# Patient Record
Sex: FEMALE | Race: WHITE | Employment: FULL TIME | URBAN - METROPOLITAN AREA
[De-identification: names, ages, dates, MRNs, and addresses within clinical notes are randomized per-mention and may not be internally consistent; named-entity substitution may affect disease eponyms.]

---

## 2019-05-17 ENCOUNTER — HOSPITAL ENCOUNTER (OUTPATIENT)
Dept: MAMMOGRAPHY | Age: 69
Discharge: HOME | End: 2019-05-17
Attending: ORTHOPAEDIC SURGERY
Payer: COMMERCIAL

## 2019-05-17 DIAGNOSIS — M17.11 PRIMARY OSTEOARTHRITIS OF RIGHT KNEE: ICD-10-CM

## 2019-05-17 DIAGNOSIS — Z78.0 ASYMPTOMATIC MENOPAUSAL STATE: ICD-10-CM

## 2019-05-17 PROCEDURE — 77080 DXA BONE DENSITY AXIAL: CPT

## 2019-11-11 ENCOUNTER — HOME HEALTH ADMISSION (OUTPATIENT)
Dept: HOME HEALTH SERVICES | Facility: HOME HEALTH | Age: 69
End: 2019-11-11
Payer: MEDICARE

## 2019-11-11 ENCOUNTER — HOSPITAL ENCOUNTER (OUTPATIENT)
Dept: SURGERY | Age: 69
Discharge: HOME | End: 2019-11-11
Payer: COMMERCIAL

## 2019-11-11 ENCOUNTER — HOSPITAL ENCOUNTER (OUTPATIENT)
Dept: PHYSICAL THERAPY | Age: 69
Discharge: HOME | End: 2019-11-11
Payer: COMMERCIAL

## 2019-11-11 LAB
ANION GAP SERPL CALC-SCNC: 7 MMOL/L (ref 7–16)
APTT PPP: 27.9 SEC (ref 24.7–39.8)
BACTERIA SPEC CULT: NORMAL
BASOPHILS # BLD: 0.1 K/UL (ref 0–0.2)
BASOPHILS NFR BLD: 1 % (ref 0–2)
BUN SERPL-MCNC: 13 MG/DL (ref 8–23)
CALCIUM SERPL-MCNC: 9.3 MG/DL (ref 8.3–10.4)
CHLORIDE SERPL-SCNC: 104 MMOL/L (ref 98–107)
CO2 SERPL-SCNC: 25 MMOL/L (ref 21–32)
CREAT SERPL-MCNC: 0.8 MG/DL (ref 0.6–1)
DIFFERENTIAL METHOD BLD: NORMAL
EOSINOPHIL # BLD: 0.2 K/UL (ref 0–0.8)
EOSINOPHIL NFR BLD: 2 % (ref 0.5–7.8)
ERYTHROCYTE [DISTWIDTH] IN BLOOD BY AUTOMATED COUNT: 14.6 % (ref 11.9–14.6)
EST. AVERAGE GLUCOSE BLD GHB EST-MCNC: 120 MG/DL
GLUCOSE SERPL-MCNC: 117 MG/DL (ref 65–100)
HBA1C MFR BLD: 5.8 %
HCT VFR BLD AUTO: 42.7 % (ref 35.8–46.3)
HGB BLD-MCNC: 13.7 G/DL (ref 11.7–15.4)
IMM GRANULOCYTES # BLD AUTO: 0 K/UL (ref 0–0.5)
IMM GRANULOCYTES NFR BLD AUTO: 0 % (ref 0–5)
INR PPP: 0.9
LYMPHOCYTES # BLD: 2.6 K/UL (ref 0.5–4.6)
LYMPHOCYTES NFR BLD: 41 % (ref 13–44)
MCH RBC QN AUTO: 30 PG (ref 26.1–32.9)
MCHC RBC AUTO-ENTMCNC: 32.1 G/DL (ref 31.4–35)
MCV RBC AUTO: 93.4 FL (ref 79.6–97.8)
MONOCYTES # BLD: 0.5 K/UL (ref 0.1–1.3)
MONOCYTES NFR BLD: 8 % (ref 4–12)
NEUTS SEG # BLD: 3 K/UL (ref 1.7–8.2)
NEUTS SEG NFR BLD: 47 % (ref 43–78)
NRBC # BLD: 0 K/UL (ref 0–0.2)
PLATELET # BLD AUTO: 269 K/UL (ref 150–450)
PMV BLD AUTO: 11.1 FL (ref 9.4–12.3)
POTASSIUM SERPL-SCNC: 4.2 MMOL/L (ref 3.5–5.1)
PROTHROMBIN TIME: 12.6 SEC (ref 11.7–14.5)
RBC # BLD AUTO: 4.57 M/UL (ref 4.05–5.2)
SERVICE CMNT-IMP: NORMAL
SODIUM SERPL-SCNC: 136 MMOL/L (ref 136–145)
WBC # BLD AUTO: 6.4 K/UL (ref 4.3–11.1)

## 2019-11-11 PROCEDURE — 80048 BASIC METABOLIC PNL TOTAL CA: CPT

## 2019-11-11 PROCEDURE — 85025 COMPLETE CBC W/AUTO DIFF WBC: CPT

## 2019-11-11 PROCEDURE — 85610 PROTHROMBIN TIME: CPT

## 2019-11-11 PROCEDURE — 87641 MR-STAPH DNA AMP PROBE: CPT

## 2019-11-11 PROCEDURE — 83036 HEMOGLOBIN GLYCOSYLATED A1C: CPT

## 2019-11-11 PROCEDURE — 97161 PT EVAL LOW COMPLEX 20 MIN: CPT

## 2019-11-11 PROCEDURE — 85730 THROMBOPLASTIN TIME PARTIAL: CPT

## 2019-11-11 PROCEDURE — 36415 COLL VENOUS BLD VENIPUNCTURE: CPT

## 2019-11-11 RX ORDER — LISINOPRIL 10 MG/1
10 TABLET ORAL
Status: ON HOLD | COMMUNITY
End: 2021-06-21 | Stop reason: SDUPTHER

## 2019-11-11 RX ORDER — MECLIZINE HYDROCHLORIDE 25 MG/1
25 TABLET ORAL
COMMUNITY

## 2019-11-11 RX ORDER — BUPROPION HYDROCHLORIDE 150 MG/1
150 TABLET ORAL 2 TIMES DAILY
COMMUNITY

## 2019-11-11 RX ORDER — ASCORBIC ACID 250 MG
750 TABLET ORAL DAILY
COMMUNITY

## 2019-11-11 RX ORDER — IBUPROFEN 200 MG
200 TABLET ORAL
COMMUNITY
End: 2020-03-20

## 2019-11-11 RX ORDER — CARISOPRODOL 350 MG/1
350 TABLET ORAL
COMMUNITY
End: 2021-06-21

## 2019-11-11 NOTE — PERIOP NOTES
Lab results within anesthesia guidelines. Recent Results (from the past 12 hour(s))   CBC WITH AUTOMATED DIFF    Collection Time: 11/11/19  8:55 AM   Result Value Ref Range    WBC 6.4 4.3 - 11.1 K/uL    RBC 4.57 4.05 - 5.2 M/uL    HGB 13.7 11.7 - 15.4 g/dL    HCT 42.7 35.8 - 46.3 %    MCV 93.4 79.6 - 97.8 FL    MCH 30.0 26.1 - 32.9 PG    MCHC 32.1 31.4 - 35.0 g/dL    RDW 14.6 11.9 - 14.6 %    PLATELET 142 136 - 272 K/uL    MPV 11.1 9.4 - 12.3 FL    ABSOLUTE NRBC 0.00 0.0 - 0.2 K/uL    DF AUTOMATED      NEUTROPHILS 47 43 - 78 %    LYMPHOCYTES 41 13 - 44 %    MONOCYTES 8 4.0 - 12.0 %    EOSINOPHILS 2 0.5 - 7.8 %    BASOPHILS 1 0.0 - 2.0 %    IMMATURE GRANULOCYTES 0 0.0 - 5.0 %    ABS. NEUTROPHILS 3.0 1.7 - 8.2 K/UL    ABS. LYMPHOCYTES 2.6 0.5 - 4.6 K/UL    ABS. MONOCYTES 0.5 0.1 - 1.3 K/UL    ABS. EOSINOPHILS 0.2 0.0 - 0.8 K/UL    ABS. BASOPHILS 0.1 0.0 - 0.2 K/UL    ABS. IMM.  GRANS. 0.0 0.0 - 0.5 K/UL   HEMOGLOBIN A1C WITH EAG    Collection Time: 11/11/19  8:55 AM   Result Value Ref Range    Hemoglobin A1c 5.8 %    Est. average glucose 610 mg/dL   METABOLIC PANEL, BASIC    Collection Time: 11/11/19  8:55 AM   Result Value Ref Range    Sodium 136 136 - 145 mmol/L    Potassium 4.2 3.5 - 5.1 mmol/L    Chloride 104 98 - 107 mmol/L    CO2 25 21 - 32 mmol/L    Anion gap 7 7 - 16 mmol/L    Glucose 117 (H) 65 - 100 mg/dL    BUN 13 8 - 23 MG/DL    Creatinine 0.80 0.6 - 1.0 MG/DL    GFR est AA >60 >60 ml/min/1.73m2    GFR est non-AA >60 >60 ml/min/1.73m2    Calcium 9.3 8.3 - 10.4 MG/DL   PROTHROMBIN TIME + INR    Collection Time: 11/11/19  8:55 AM   Result Value Ref Range    Prothrombin time 12.6 11.7 - 14.5 sec    INR 0.9     PTT    Collection Time: 11/11/19  8:55 AM   Result Value Ref Range    aPTT 27.9 24.7 - 39.8 SEC

## 2019-11-11 NOTE — PROGRESS NOTES
SW met with pt in Prehab to discuss Right TKA scheduled for 12/2/19. Pt lives alone but plans to return home with HHPT. Pt states her sister can stay with her a few nights at home postop. Pt [facility] and is agreeable to [facility]. [facility] referral completed. Pt has a RW and high toilet seats. No additional needs or questions identified at this time.    Yovanny Stewartop

## 2019-11-11 NOTE — PERIOP NOTES
Patient verified name and     Order for consent was found in EHR and matches case posting; patient verified. Type 3 surgery, PAT joint camp assessment complete. Labs per surgeon: CBC,BMP,PT/PTT, HgbA1C, MRSA swab; results within anesthesia guidelines  Labs per anesthesia protocol: No additional lab required  EKG: Done 2019 and copy placed on chart for anesthesia reference    Called Dr. Francisco Javier Matthew office and requested last office note to be faxed to [phone] for anesthesia reference. Spoke with Minor Jean Baptiste at the office. Hospital approved surgical skin cleanser and instructions given per hospital policy. Patient provided with and instructed on educational handouts including Guide to Surgery, Pain Management, Hand Hygiene, Blood Transfusion Education, and [place] [facility] Brochure. Patient answered medical/surgical history questions at their best of ability. All prior to admission medications documented in [facility]. Original medication prescription bottle not visualized during patient appointment. Patient instructed to hold all vitamins 7 days prior to surgery and NSAIDS 5 days prior to surgery, patient verbalized understanding. Prescription medications to hold: Ibuprofen x 5 days    Patient instructed to continue previous medications as prescribed prior to surgery and to take the following medications the day of surgery according to anesthesia guidelines with a small sip of water: Soma and Wellbutrin. Patient teach back successful and patient demonstrates knowledge of instructions.

## 2019-11-11 NOTE — PROGRESS NOTES
Rufus Kussmaul  : [birth year](52 y.o.) [facility] at [place] [address], [address] [facility] [place]  [phone]       Physical Therapy Prehab Plan of Treatment and Evaluation Summary:2019    ICD-10: Treatment Diagnosis:   · Pain in Right Knee (M25.561)  · Stiffness of Right Knee, Not elsewhere classified (M25.661)  · Difficulty in walking, Not elsewhere classified (R26.2)  · Other abnormalities of gait and mobility (R26.89)  Precautions/Allergies:   Codeine  MEDICAL/REFERRING DIAGNOSIS:  Unilateral primary osteoarthritis, right knee [M17.11]  REFERRING PHYSICIAN: James Yee MD  DATE OF SURGERY: 19    Assessment:   Comments:  Pain in right knee joint with decreased independence with functional mobility. Pt plans to return home following hospital stay. Pt motivated and prepared for surgery. PROBLEM LIST (Impacting functional limitations):  Ms. Sandra Sevilla presents with the following right lower extremity(s) problems:  1. Transfers  2. Gait  3. Strength  4. Range of Motion  5. Pain   INTERVENTIONS PLANNED:  1. Home Exercise Program  2. Educational Discussion      TREATMENT PLAN: Effective Dates: 2019 TO 2019. Frequency/Duration: Patient to continue to perform home exercise program at least twice per day up until her surgery. GOALS: (Goals have been discussed and agreed upon with patient.)  Discharge Goals: Time Frame: 1 Day  1. Patient will demonstrate independence with a home exercise program designed to increase strength, range of motion and pain control to minimize functional deficits and optimize patient for total joint replacement. Rehabilitation Potential For Stated Goals: Good  Regarding Andre Hand's therapy, I certify that the treatment plan above will be carried out by a therapist or under their direction.   Thank you for this referral,  Kathrine Mcclure, PT               HISTORY:   Present Symptoms:  Pain Intensity 1: 5  Pain Location 1: Knee  Pain Orientation 1: Right   History of Present Injury/Illness (Reason for Referral):  Medical/Referring Diagnosis: Unilateral primary osteoarthritis, right knee [M17.11]   Past Medical History/Comorbidities:   Ms. Naveen De La Cruz  has a past medical history of Adverse effect of anesthesia, Arthritis, Cancer ([facility] [address] and Hypertension. Ms. Naveen De La Cruz  has a past surgical history that includes hx cataract removal (Bilateral, 12/2018,01/2019) and hx tubal ligation. Social History/Living Environment:   Home Environment: Private residence  # Steps to Enter: 14  One/Two Story Residence: Two story, live on 1st floor  # of Interior Steps: 0  Living Alone: Yes  Support Systems: Other (comments)(siblings)  Patient Expects to be Discharged to[de-identified] Private residence  Current DME Used/Available at Home: Anastacia Pillai, [address] [employer] [address] chair  Tub or Shower Type: Tub/Shower combination  Work/Activity:  Employment requires light activity.   Dominant Side:  RIGHT  Current Medications:  See Pre-assessment nursing note   Number of Personal Factors/Comorbidities that affect the Plan of Care: 0: LOW COMPLEXITY   EXAMINATION:   ADLs (Current Functional Status):   Ambulation:  [] Independent  [] Walk Indoors Only  [] Walk Outdoors  [x] Use Assistive Device walker  [] Use Wheelchair Only Dressing:  [] Independent  Requires Assistance from Someone for:  [] Sock/Shoes  [] Pants  [] Everything   Bathing/Showering:   [x] Independent  [] Requires Assistance from Someone  [] Sponge Bath Only Household Activities:  [] Routine house and yard work  [x] Light Housework Only  [] None   Observation/Orthostatic Postural Assessment:   Within defined limitsGenu valgus right  ROM/Flexibility:   AROM: Generally decreased, functional                LLE Assessment  LLE Assessment (WDL): Within defined limits      RLE AROM  R Knee Flexion: 80  R Knee Extension: 25   Strength:   Strength: Generally decreased, functional                  Functional Mobility:    Coordination: Generally decreased, functional    Gait Description (WDL): Within defined limits  Stand to Sit: Independent  Sit to Stand: Independent  Distance (ft): 1000 Feet (ft)  Ambulation - Level of Assistance: Independent  Gait Abnormalities: Antalgic          Balance:    Sitting: Intact  Standing: Intact   Body Structures Involved:  1. Bones  2. Joints  3. Muscles  4. Ligaments Body Functions Affected:  1. Neuromusculoskeletal  2. Movement Related Activities and Participation Affected:  1. Mobility   Number of elements that affect the Plan of Care: 4+: HIGH COMPLEXITY   CLINICAL PRESENTATION:   Presentation: Stable and uncomplicated: LOW COMPLEXITY   CLINICAL DECISION MAKING:   Outcome Measure: Tool Used: Lower Extremity Functional Scale (LEFS)  Score:  Initial: 36/80 Most Recent: X/80 (Date: -- )   Interpretation of Score: 20 questions each scored on a 5 point scale with 0 representing \"extreme difficulty or unable to perform\" and 4 representing \"no difficulty\". The lower the score, the greater the functional disability. 80/80 represents no disability. Minimal detectable change is 9 points. Medical Necessity:   · Ms. Lucille Almazan is expected to optimize her lower extremity strength and ROM in preparation for joint replacement surgery. Reason for Services/Other Comments:  · Achieve baseline assesment of musculoskeletal system, functional mobility and home environment. , educate in PT HEP in preparation for surgery, educate in hospital plan of care. Use of outcome tool(s) and clinical judgement create a POC that gives a: Clear prediction of patient's progress: LOW COMPLEXITY   TREATMENT:   Treatment/Session Assessment:  Patient was instructed in PT- HEP to increase strength and ROM in LEs. Answered all questions. · Post session pain:  5  · Compliance with Program/Exercises: compliant most of the time.   Total Treatment Duration:  PT Patient Time In/Time Out  Time In: 0915  Time Out: [facility]

## 2019-11-12 VITALS
HEART RATE: 83 BPM | SYSTOLIC BLOOD PRESSURE: 147 MMHG | OXYGEN SATURATION: 96 % | HEIGHT: 66 IN | TEMPERATURE: 98 F | DIASTOLIC BLOOD PRESSURE: 83 MMHG | RESPIRATION RATE: 20 BRPM

## 2019-11-12 NOTE — ADVANCED PRACTICE NURSE
Total Joint Surgery Preoperative Chart Review      Patient ID:  Albert De La Rosa  [id number]  89 y.o.  1950  Surgeon: Dr. Иван Tinajero  Date of Surgery: 2019  Procedure: Total Right Knee Arthroplasty  Primary Care Physician: Roxanne Oropeza -[phone]  Specialty Physician(s):      Subjective:   Albert De La Rosa is a 80 y.o. WHITE OR  female who presents for preoperative evaluation for Total Right Knee arthroplasty. This is a preoperative chart review note based on data collected by the nurse at the surgical Pre-Assessment visit. Past Medical History:   Diagnosis Date    Adverse effect of anesthesia     \"hard to knock me out sometimes\"    Arthritis     Cancer ([facility] [address]     melanoma X 2    Hypertension     managed with medication      Past Surgical History:   Procedure Laterality Date    HX CATARACT REMOVAL Bilateral 2018,2019    HX TUBAL LIGATION      2019-\"40 yrs ago\"     History reviewed. No pertinent family history. Social History     Tobacco Use    Smoking status: Former Smoker     Last attempt to quit: 2019     Years since quittin.4    Smokeless tobacco: Never Used   Substance Use Topics    Alcohol use: Yes     Comment: occasional       Prior to Admission medications    Medication Sig Start Date End Date Taking? Authorizing Provider   buPROPion XL (WELLBUTRIN XL) 150 mg tablet Take 150 mg by mouth two (2) times a day. Yes Provider, Historical   lisinopril (PRINIVIL, ZESTRIL) 10 mg tablet Take 10 mg by mouth nightly. Yes Provider, Historical   meclizine (ANTIVERT) 25 mg tablet Take 25 mg by mouth as needed. Yes Provider, Historical   carisoprodol (SOMA) 350 mg tablet Take 350 mg by mouth as needed for Muscle Spasm(s). Yes Provider, Historical   cholecalciferol, vitamin D3, (VITAMIN D3 PO) Take 1 Tab by mouth two (2) times a day. Yes Provider, Historical   ginkgo biloba (GINKOBA PO) Take 120 mg by mouth two (2) times a day.    Yes Provider, Historical folic acid/multivit-min/lutein (CENTRUM SILVER PO) Take 1 Tab by mouth daily. Yes Provider, Historical   ascorbic acid, vitamin C, (VITAMIN C) 250 mg tablet Take 750 mg by mouth daily. Yes Provider, Historical   ibuprofen (ADVIL) 200 mg tablet Take 200 mg by mouth every six (6) hours as needed for Pain. Yes Provider, Historical     Allergies   Allergen Reactions    Codeine Nausea and Vomiting     Increased heart rate          Objective:     Physical Exam:   No data found. ECG:    EKG Results     None          Data Review:   Labs:   Results for Colleen Elizondo (MRN [id number]) as of 11/12/2019 12:13   Ref. Range 11/11/2019 08:55   Sodium Latest Ref Range: 136 - 145 mmol/L 136   Potassium Latest Ref Range: 3.5 - 5.1 mmol/L 4.2   Chloride Latest Ref Range: 98 - 107 mmol/L 104   CO2 Latest Ref Range: 21 - 32 mmol/L 25   Anion gap Latest Ref Range: 7 - 16 mmol/L 7   Glucose Latest Ref Range: 65 - 100 mg/dL 117 (H)   BUN Latest Ref Range: 8 - 23 MG/DL 13   Creatinine Latest Ref Range: 0.6 - 1.0 MG/DL 0.80   Calcium Latest Ref Range: 8.3 - 10.4 MG/DL 9.3   GFR est non-AA Latest Ref Range: >60 ml/min/1.73m2 >60   GFR est AA Latest Ref Range: >60 ml/min/1.73m2 >60   Hemoglobin A1c, (calculated) Latest Units: % 5.8   Est. average glucose Latest Units: mg/dL 120       Total Joint Surgery Pre-Assessment Recommendations:           Recommend continuous saturation monitoring hours of sleep, during hospitalization.       Signed By: EUGENIA Quinones    November 12, 2019

## 2019-11-13 NOTE — PROGRESS NOTES
19 0900   Oxygen Therapy   O2 Sat (%) 95 %   Pulse via Oximetry 100 beats per minute   O2 Device Room air   Pre-Treatment   Breath Sounds Bilateral Clear   Pre FEV1 (liters) 2.2 liters   % Predicted 88   Incentive Spirometry Treatment   Actual Volume (ml) 2500 ml     Initial respiratory Assessment completed with pt. Pt was interviewed and evaluated in Joint camp prior to surgery. Patient ID:  Bronson Morales  [id number]  81 y.o.  1950  Surgeon: Dr. Marvin Titus  Date of Surgery: The linked surgery was not found. Please check manually. Procedure: Total Right Knee Arthroplasty  Primary Care Physician: Rosana Dobson -[phone]  Specialists:                                  Pt instructed in the use of Incentive Spirometry. Pt instructed to bring Incentive Spirometer back on date of surgery & to start using Is upon return to pt room.     Pt taught proper cough technique    History of smokin-5 CIGARETTES/DAY FOR 30 YEARS                                                      Quit date:           Secondhand smoke:PARENTS      Past procedures with Oxygen desaturation:DENIES    Past Medical History:   Diagnosis Date    Adverse effect of anesthesia     \"hard to knock me out sometimes\"    Arthritis     Cancer ([facility] [address]     melanoma X 2    Hypertension     managed with medication                                                                                                                                                     Respiratory history:DENIES SOB                                                                     Respiratory meds:  DENIES                                       FAMILY PRESENT:                                                         NO                                        PAST SLEEP STUDY:                          DENIES  HX OF SOFIA:                                           DENIES                                     SOFIA assessment: SLEEPS ON SIDE           PHYSICAL EXAM   There is no height or weight on file to calculate BMI.    Visit Vitals  /83 (BP 1 Location: Left arm, BP Patient Position: Sitting)   Pulse 83   Temp 98 °F (36.7 °C)   Resp 20   Ht 5' 5.5\" (1.664 m)   SpO2 96%     Neck circumference:   35   cm    Loud snoring:        DOESN'T KNOW                                 Witnessed apnea or wakening gasping or choking:,             DENIES,                                                                                                 Awakens with headaches:                                                  DENIES    Morning or daytime tiredness/ sleepiness:                    DENIES                                                                                         Dry mouth or sore throat in morning:                                                                                     DENIES    Pollard stage:  3    SACS score:4      Stop Bang   STOP-BANG  Does the patient snore loudly (louder than talking or loud enough to be heard through closed doors)?: No  Does the patient often feel tired, fatigued, or sleepy during the daytime, even after a \"good\" night's sleep?: No  Has anyone ever observed the patient stop breathing during their sleep? : No  Does the patient have or are they being treated for high blood pressure?: Yes  Is the patient's BMI greater than 35?: No  Is your neck circumference greater than 17 inches (Male) or 16 inches (Female)?: No  Is the patient older than 48?: Yes  Is the patient male?: No  SOFIA Score: 2                            CPAP:                       NONE                                                 CONT SAT HS            Referrals:    Pt. Phone Number:

## 2019-11-26 NOTE — H&P
Patient has been treated by her PCP for a urinary track infection and has finished her antibiotic course but she is still experiencing symptoms. This has been going on for 3 weeks. She has tried to get back into see Dr. Michaels but has not heard back from their office. I instructed her to call again and get into see them this week.

## 2019-11-27 RX ORDER — CEFAZOLIN SODIUM/WATER 2 G/20 ML
2 SYRINGE (ML) INTRAVENOUS ONCE
Status: CANCELLED | OUTPATIENT
Start: 2019-12-02 | End: 2019-12-02

## 2019-12-30 ENCOUNTER — HOSPITAL ENCOUNTER (OUTPATIENT)
Dept: SURGERY | Age: 69
Discharge: HOME | End: 2019-12-30

## 2019-12-30 NOTE — PERIOP NOTES
Pt did not come for 3:00 PAT appointment. Unable to leave pt a message on the only  number listed on chart. I called and left voice message for Rashida Henson at Dr Jade Lucas office.

## 2020-02-25 ENCOUNTER — APPOINTMENT (OUTPATIENT)
Dept: PHYSICAL THERAPY | Age: 70
End: 2020-02-25

## 2020-02-29 ENCOUNTER — HOSPITAL ENCOUNTER (OUTPATIENT)
Dept: CT IMAGING | Age: 70
Discharge: HOME | End: 2020-02-29
Attending: NURSE PRACTITIONER
Payer: SELF-PAY

## 2020-02-29 DIAGNOSIS — N20.0 KIDNEY STONE: ICD-10-CM

## 2020-02-29 DIAGNOSIS — N39.0 RECURRENT UTI: ICD-10-CM

## 2020-02-29 PROCEDURE — 74176 CT ABD & PELVIS W/O CONTRAST: CPT

## 2020-03-04 ENCOUNTER — ANESTHESIA EVENT (OUTPATIENT)
Dept: SURGERY | Age: 70
End: 2020-03-04
Payer: MEDICARE

## 2020-03-05 ENCOUNTER — HOSPITAL ENCOUNTER (OUTPATIENT)
Age: 70
Setting detail: OUTPATIENT SURGERY
Discharge: HOME | End: 2020-03-05
Attending: UROLOGY | Admitting: UROLOGY
Payer: MEDICARE

## 2020-03-05 ENCOUNTER — ANESTHESIA (OUTPATIENT)
Dept: SURGERY | Age: 70
End: 2020-03-05
Payer: MEDICARE

## 2020-03-05 VITALS
TEMPERATURE: 97.9 F | WEIGHT: 177 LBS | OXYGEN SATURATION: 97 % | DIASTOLIC BLOOD PRESSURE: 75 MMHG | BODY MASS INDEX: 28.57 KG/M2 | HEART RATE: 92 BPM | SYSTOLIC BLOOD PRESSURE: 133 MMHG | RESPIRATION RATE: 14 BRPM

## 2020-03-05 DIAGNOSIS — N20.1 URETERAL STONE: Primary | ICD-10-CM

## 2020-03-05 LAB — POTASSIUM BLD-SCNC: 3.9 MMOL/L (ref 3.5–5.1)

## 2020-03-05 PROCEDURE — 88300 SURGICAL PATH GROSS: CPT

## 2020-03-05 PROCEDURE — 77030020463 HC FCPS ENDOSC STN BSC -C: Performed by: UROLOGY

## 2020-03-05 PROCEDURE — 77030010509 HC AIRWY LMA MSK TELE -A: Performed by: ANESTHESIOLOGY

## 2020-03-05 PROCEDURE — 74011636320 HC RX REV CODE- 636/320: Performed by: UROLOGY

## 2020-03-05 PROCEDURE — 74011250636 HC RX REV CODE- 250/636: Performed by: ANESTHESIOLOGY

## 2020-03-05 PROCEDURE — 76210000020 HC REC RM PH II FIRST 0.5 HR: Performed by: UROLOGY

## 2020-03-05 PROCEDURE — 77030033647: Performed by: UROLOGY

## 2020-03-05 PROCEDURE — 76210000016 HC OR PH I REC 1 TO 1.5 HR: Performed by: UROLOGY

## 2020-03-05 PROCEDURE — 84132 ASSAY OF SERUM POTASSIUM: CPT

## 2020-03-05 PROCEDURE — 77030018832 HC SOL IRR H20 ICUM -A: Performed by: UROLOGY

## 2020-03-05 PROCEDURE — C2617 STENT, NON-COR, TEM W/O DEL: HCPCS | Performed by: UROLOGY

## 2020-03-05 PROCEDURE — 82355 CALCULUS ANALYSIS QUAL: CPT

## 2020-03-05 PROCEDURE — 77030019927 HC TBNG IRR CYSTO BAXT -A: Performed by: UROLOGY

## 2020-03-05 PROCEDURE — 77030019908 HC STETH ESOPH SIMS -A: Performed by: ANESTHESIOLOGY

## 2020-03-05 PROCEDURE — 77030040361 HC SLV COMPR DVT MDII -B: Performed by: UROLOGY

## 2020-03-05 PROCEDURE — 74011250637 HC RX REV CODE- 250/637: Performed by: ANESTHESIOLOGY

## 2020-03-05 PROCEDURE — 76060000033 HC ANESTHESIA 1 TO 1.5 HR: Performed by: UROLOGY

## 2020-03-05 PROCEDURE — 74011250636 HC RX REV CODE- 250/636: Performed by: UROLOGY

## 2020-03-05 PROCEDURE — C1769 GUIDE WIRE: HCPCS | Performed by: UROLOGY

## 2020-03-05 PROCEDURE — 76010000149 HC OR TIME 1 TO 1.5 HR: Performed by: UROLOGY

## 2020-03-05 PROCEDURE — 74011000250 HC RX REV CODE- 250: Performed by: NURSE ANESTHETIST, CERTIFIED REGISTERED

## 2020-03-05 PROCEDURE — C1894 INTRO/SHEATH, NON-LASER: HCPCS | Performed by: UROLOGY

## 2020-03-05 PROCEDURE — 74011250636 HC RX REV CODE- 250/636: Performed by: NURSE ANESTHETIST, CERTIFIED REGISTERED

## 2020-03-05 DEVICE — URETERAL STENT
Type: IMPLANTABLE DEVICE | Site: URETER | Status: FUNCTIONAL
Brand: PERCUFLEX™ PLUS

## 2020-03-05 RX ORDER — MIDAZOLAM HYDROCHLORIDE 1 MG/ML
2 INJECTION, SOLUTION INTRAMUSCULAR; INTRAVENOUS ONCE
Status: DISCONTINUED | OUTPATIENT
Start: 2020-03-05 | End: 2020-03-05 | Stop reason: HOSPADM

## 2020-03-05 RX ORDER — SODIUM CHLORIDE, SODIUM LACTATE, POTASSIUM CHLORIDE, CALCIUM CHLORIDE 600; 310; 30; 20 MG/100ML; MG/100ML; MG/100ML; MG/100ML
75 INJECTION, SOLUTION INTRAVENOUS CONTINUOUS
Status: DISCONTINUED | OUTPATIENT
Start: 2020-03-05 | End: 2020-03-05 | Stop reason: HOSPADM

## 2020-03-05 RX ORDER — HYDROMORPHONE HYDROCHLORIDE 2 MG/ML
0.5 INJECTION, SOLUTION INTRAMUSCULAR; INTRAVENOUS; SUBCUTANEOUS
Status: DISCONTINUED | OUTPATIENT
Start: 2020-03-05 | End: 2020-03-05 | Stop reason: HOSPADM

## 2020-03-05 RX ORDER — FENTANYL CITRATE 50 UG/ML
INJECTION, SOLUTION INTRAMUSCULAR; INTRAVENOUS AS NEEDED
Status: DISCONTINUED | OUTPATIENT
Start: 2020-03-05 | End: 2020-03-05 | Stop reason: HOSPADM

## 2020-03-05 RX ORDER — ONDANSETRON 2 MG/ML
INJECTION INTRAMUSCULAR; INTRAVENOUS AS NEEDED
Status: DISCONTINUED | OUTPATIENT
Start: 2020-03-05 | End: 2020-03-05 | Stop reason: HOSPADM

## 2020-03-05 RX ORDER — OXYCODONE HYDROCHLORIDE 5 MG/1
10 TABLET ORAL
Status: DISCONTINUED | OUTPATIENT
Start: 2020-03-05 | End: 2020-03-05 | Stop reason: HOSPADM

## 2020-03-05 RX ORDER — CEPHALEXIN 500 MG/1
500 CAPSULE ORAL 2 TIMES DAILY
Qty: 10 CAP | Refills: 0 | Status: SHIPPED | OUTPATIENT
Start: 2020-03-05 | End: 2020-03-10

## 2020-03-05 RX ORDER — CEFAZOLIN SODIUM/WATER 2 G/20 ML
2 SYRINGE (ML) INTRAVENOUS
Status: COMPLETED | OUTPATIENT
Start: 2020-03-05 | End: 2020-03-05

## 2020-03-05 RX ORDER — PHENAZOPYRIDINE HYDROCHLORIDE 200 MG/1
200 TABLET, FILM COATED ORAL
Qty: 9 TAB | Refills: 0 | Status: SHIPPED | OUTPATIENT
Start: 2020-03-05 | End: 2020-03-08

## 2020-03-05 RX ORDER — DEXAMETHASONE SODIUM PHOSPHATE 4 MG/ML
INJECTION, SOLUTION INTRA-ARTICULAR; INTRALESIONAL; INTRAMUSCULAR; INTRAVENOUS; SOFT TISSUE AS NEEDED
Status: DISCONTINUED | OUTPATIENT
Start: 2020-03-05 | End: 2020-03-05 | Stop reason: HOSPADM

## 2020-03-05 RX ORDER — FAMOTIDINE 20 MG/1
20 TABLET, FILM COATED ORAL ONCE
Status: COMPLETED | OUTPATIENT
Start: 2020-03-05 | End: 2020-03-05

## 2020-03-05 RX ORDER — LIDOCAINE HYDROCHLORIDE 20 MG/ML
INJECTION, SOLUTION EPIDURAL; INFILTRATION; INTRACAUDAL; PERINEURAL AS NEEDED
Status: DISCONTINUED | OUTPATIENT
Start: 2020-03-05 | End: 2020-03-05 | Stop reason: HOSPADM

## 2020-03-05 RX ORDER — LIDOCAINE HYDROCHLORIDE 10 MG/ML
0.1 INJECTION INFILTRATION; PERINEURAL AS NEEDED
Status: DISCONTINUED | OUTPATIENT
Start: 2020-03-05 | End: 2020-03-05 | Stop reason: HOSPADM

## 2020-03-05 RX ORDER — FENTANYL CITRATE 50 UG/ML
100 INJECTION, SOLUTION INTRAMUSCULAR; INTRAVENOUS ONCE
Status: DISCONTINUED | OUTPATIENT
Start: 2020-03-05 | End: 2020-03-05 | Stop reason: HOSPADM

## 2020-03-05 RX ORDER — OXYCODONE HYDROCHLORIDE 5 MG/1
5 TABLET ORAL
Status: DISCONTINUED | OUTPATIENT
Start: 2020-03-05 | End: 2020-03-05 | Stop reason: HOSPADM

## 2020-03-05 RX ORDER — OXYCODONE AND ACETAMINOPHEN 5; 325 MG/1; MG/1
1 TABLET ORAL
Qty: 20 TAB | Refills: 0 | Status: SHIPPED | OUTPATIENT
Start: 2020-03-05 | End: 2020-03-12

## 2020-03-05 RX ORDER — HYOSCYAMINE SULFATE 0.12 MG/1
0.12 TABLET SUBLINGUAL
Qty: 30 TAB | Refills: 1 | Status: SHIPPED | OUTPATIENT
Start: 2020-03-05

## 2020-03-05 RX ORDER — PROPOFOL 10 MG/ML
INJECTION, EMULSION INTRAVENOUS AS NEEDED
Status: DISCONTINUED | OUTPATIENT
Start: 2020-03-05 | End: 2020-03-05 | Stop reason: HOSPADM

## 2020-03-05 RX ADMIN — PROPOFOL 250 MG: 10 INJECTION, EMULSION INTRAVENOUS at 12:21

## 2020-03-05 RX ADMIN — Medication 2 G: at 12:18

## 2020-03-05 RX ADMIN — HYDROMORPHONE HYDROCHLORIDE 0.5 MG: 2 INJECTION INTRAMUSCULAR; INTRAVENOUS; SUBCUTANEOUS at 13:32

## 2020-03-05 RX ADMIN — DEXAMETHASONE SODIUM PHOSPHATE 4 MG: 4 INJECTION, SOLUTION INTRAMUSCULAR; INTRAVENOUS at 12:31

## 2020-03-05 RX ADMIN — LIDOCAINE HYDROCHLORIDE 100 MG: 20 INJECTION, SOLUTION EPIDURAL; INFILTRATION; INTRACAUDAL; PERINEURAL at 12:20

## 2020-03-05 RX ADMIN — FENTANYL CITRATE 25 MCG: 50 INJECTION INTRAMUSCULAR; INTRAVENOUS at 13:08

## 2020-03-05 RX ADMIN — SODIUM CHLORIDE, SODIUM LACTATE, POTASSIUM CHLORIDE, AND CALCIUM CHLORIDE 75 ML/HR: 600; 310; 30; 20 INJECTION, SOLUTION INTRAVENOUS at 10:33

## 2020-03-05 RX ADMIN — ONDANSETRON 4 MG: 2 INJECTION INTRAMUSCULAR; INTRAVENOUS at 12:30

## 2020-03-05 RX ADMIN — FENTANYL CITRATE 25 MCG: 50 INJECTION INTRAMUSCULAR; INTRAVENOUS at 12:58

## 2020-03-05 RX ADMIN — FENTANYL CITRATE 25 MCG: 50 INJECTION INTRAMUSCULAR; INTRAVENOUS at 12:48

## 2020-03-05 RX ADMIN — FENTANYL CITRATE 25 MCG: 50 INJECTION INTRAMUSCULAR; INTRAVENOUS at 12:28

## 2020-03-05 RX ADMIN — HYDROMORPHONE HYDROCHLORIDE 0.5 MG: 2 INJECTION INTRAMUSCULAR; INTRAVENOUS; SUBCUTANEOUS at 13:38

## 2020-03-05 RX ADMIN — FAMOTIDINE 20 MG: 20 TABLET ORAL at 10:34

## 2020-03-05 NOTE — OP NOTES
[facility]  OPERATIVE REPORT    Name:  Og Barone  MR#:  [id number]  :  1950  ACCOUNT #:  [de-identified]  DATE OF SERVICE:  2020    PREOPERATIVE DIAGNOSIS:  Bilateral ureteral stones. POSTOPERATIVE DIAGNOSIS:  Bilateral ureteral stones. PROCEDURES PERFORMED:  Cystoscopy, bilateral retrograde pyelograms, bilateral ureteroscopy, laser lithotripsy, basket of stone, bilateral ureteral stent placement. SURGEON:  Mark Funk MD    ASSISTANT:  None. ANESTHESIA:  General.    COMPLICATIONS:  None. SPECIMENS REMOVED:  Right ureter stone sent for analysis. IMPLANTS:  6 x 26 double-J ureteral stents bilaterally with strings attached. ESTIMATED BLOOD LOSS:  1 mL. FINDINGS:  Bilateral distal ureteral stones impacted in the distal ureters, fragmented and removed. Bilateral hydroureteronephrosis on retrograde pyelograms down to the level of the stones, bilateral ureteral stent placement with strings    INDICATIONS FOR OPERATIVE PROCEDURE:  The patient is a 44-year-old female with a  history of kidney stones, found to have bilateral distal ureteral stones that were obstructing and therefore was taken to the operating room urgently for bilateral ureteroscopy with stent placement today. DESCRIPTION OF PROCEDURE:  After informed consent was obtained and the correct patient was identified in the preoperative holding area, she was taken back to the operating suite and placed on table in the supine position. Time-out was performed confirming the correct patient and planned procedure. She received 2 g of IV Ancef prior to smooth induction of general endotracheal anesthesia. She was moved into dorsal lithotomy position, prepped and draped in usual sterile fashion. I began the case by inserting a 22-Portuguese rigid cystoscope with a 30-degree lens in the urethra and advanced into the patient's bladder. Pancystoscopy was performed which was unremarkable.   Ureteral orifices were in orthotopic positions bilaterally. I cannulated each with a sensor wire which was passed under fluoroscopic guidance into the bilateral renal pelvises. I then backloaded the scope off the sensor wires and switched to pressure bag and my semi-rigid ureteroscope. I started with the right ureter and inserted this under direct vision alongside the wire. I immediately encountered a distal right ureteral stone, which I lasered using a 365 micron laser fiber with the settings of 0.5 joules and 5 Hz into basketable pieces. A Tricep basket was used to remove all the pieces and deposit them in the bladder. I then inspected the ureter carefully all the way up to the kidney and down to the bladder. I injected 10 mL of Conray to perform retrograde pyelogram, which revealed hydroureteronephrosis down to the level of where the stone was stuck. The stone was impacted when I encountered it, but the ureter was widely patent thereafter removal.  I then turned my attention to left ureteral orifice and inserted the rigid ureteroscope into this in the exact same fashion as I did on the right side. I again encountered another impacted stone on this left side and lasered it into basketable pieces. I then removed it and using a Tricep basket inspected the ureter completely. There were no further stones or abnormalities noted. I injected Conray to perform the left retrograde pyelogram which again showed hydroureteronephrosis down to the level of where the impacted stone was, but no other abnormalities. At this point, I removed my semi-rigid ureteroscope. I then loaded 6 x 26 ureteral stents onto each wire using the rigid cystoscope and passed them under fluoroscopic guidance sequentially into the left and right renal pelvises. Once the stents were in position, I pulled the wires noting good curls in the renal pelvises as well as the bladder. Strings were left extruding from the meatus.   The bladder was then drained and the stone was removed from the bladder. [place] was sent for analysis. The patient was then awoken from anesthesia, transferred to PACU in stable condition. She tolerated the procedure well. There were no complications. All counts were correct at the end of procedure.         Desirae Cervantes MD      [id number]  D:  03/05/2020 13:25  T:  03/05/2020 18:21  JOB #:  [id number]

## 2020-03-05 NOTE — BRIEF OP NOTE
BRIEF OPERATIVE NOTE    Date of Procedure: 3/5/2020     Preoperative Diagnosis: Ureteral stone [N20.1]    Postoperative Diagnosis: Ureteral stone [N20.1]      Procedure(s):  CYSTOSCOPY BILATERAL RETROGRADE PYELOGRAMS,  AND BILATERAL URETEROSCOPY LASER LITHOTRIPSY, BASKET OF STONE, BILATERAL STENT PLACEMENT    Surgeon(s) and Role:     Bernarda Dior MD - Primary         Surgical Assistant: None    Surgical Staff:  Circ-1: Carter Acostaome  Scrub Tech-1: Uziel Earl  Event Time In Time Out   Incision Start 1236    Incision Close       Anesthesia: General   Estimated Blood Loss: 1 cc  Specimens:   ID Type Source Tests Collected by Time Destination   1 : Right Ureter Stone Fresh Hema Aguilar MD 3/5/2020 1306 Pathology      Findings: Bilateral distal ureteral stones impacted in distal ureter fragmented and removed. Bilateral hydro-ureteronephrosis on retrograde pyelograms down to the level of the stones. Bilateral ureteral stent placement with strings     Complications: None    Implants:   Implant Name Type Inv.  Item Serial No.  Lot No. LRB No. Used Action   STENT URET 6F 26CM -- PERCUFLEX PLUS [id number] - [id number]  STENT URET 6F 26CM -- [address] [place] [address] [id number]  [facility] UROLOGY-[facility] [id number] Left 1 Implanted

## 2020-03-05 NOTE — H&P
H&P Update:  María Elena Ulrich was seen and examined. History and physical has been reviewed. The patient has been examined. There have been no significant clinical changes since the completion of the originally dated History and Physical.    María Elena Ulrich  : 1950         Chief Complaint   Patient presents with    Recurrent UTI    New Patient            HELEN Ulrich is a 77 y.o. female presenting here as a new patient for recurrent UTIs that started in 2019. She has been treated by her PCP, Dr. Gladies Boast. Currently finishing macrobid. Urine cultures all with E. Coli:   10/24/19- E. Coli  19- E. Coli  19- E. Coli  19- E. Coli  20- E. Coli      Prior to October, she reports having UTIs maybe once every two years. She has never liked to wear underwear. She has had one vaginal delivery. She is not sexually active. She drinks water throughout the day. Denies constipation. Reports urge incontinence only at night and this is occasional, denies urge incontinence during the day. Denies vaginal dryness. Reports ongoing dysuria,  urinary frequency and occasional \"kidney soreness\". Hx of kidney stones in the past, passing two stones, not requiring procedure.   Feels like the macrodantin is just not working, denies fevers/chills with current UTI but has had febrile UTIs recently.       PVR today is 6 ml by US.          Past Medical History:   Diagnosis Date    Adverse effect of anesthesia       \"hard to knock me out sometimes\"    Arthritis      Cancer ([facility] [address]       melanoma X 2    Hypertension       managed with medication            Past Surgical History:   Procedure Laterality Date    HX CATARACT REMOVAL Bilateral 2018,2019    HX TUBAL LIGATION         2019-\"40 yrs ago\"             Current Outpatient Medications   Medication Sig Dispense Refill    buPROPion XL (WELLBUTRIN XL) 150 mg tablet Take 150 mg by mouth two (2) times a day.        lisinopril (PRINIVIL, ZESTRIL) 10 mg tablet Take 10 mg by mouth nightly.        meclizine (ANTIVERT) 25 mg tablet Take 25 mg by mouth as needed.        carisoprodol (SOMA) 350 mg tablet Take 350 mg by mouth as needed for Muscle Spasm(s).        cholecalciferol, vitamin D3, (VITAMIN D3 PO) Take 1 Tab by mouth two (2) times a day.        ginkgo biloba (GINKOBA PO) Take 120 mg by mouth two (2) times a day.        folic acid/multivit-min/lutein (CENTRUM SILVER PO) Take 1 Tab by mouth daily.        ascorbic acid, vitamin C, (VITAMIN C) 250 mg tablet Take 750 mg by mouth daily.        ibuprofen (ADVIL) 200 mg tablet Take 200 mg by mouth every six (6) hours as needed for Pain.                Allergies   Allergen Reactions    Codeine Nausea and Vomiting       Increased heart rate      Social History            Socioeconomic History    Marital status:        Spouse name: Not on file    Number of children: Not on file    Years of education: Not on file    Highest education level: Not on file   Occupational History    Not on file   Social Needs    Financial resource strain: Not on file    Food insecurity:       Worry: Not on file       Inability: Not on file    Transportation needs:       Medical: Not on file       Non-medical: Not on file   Tobacco Use    Smoking status: Former Smoker       Last attempt to quit: 2019       Years since quittin.7    Smokeless tobacco: Never Used   Substance and Sexual Activity    Alcohol use:  Yes       Comment: occasional    Drug use: Never    Sexual activity: Not on file   Lifestyle    Physical activity:       Days per week: Not on file       Minutes per session: Not on file    Stress: Not on file   Relationships    Social connections:       Talks on phone: Not on file       Gets together: Not on file       Attends Gnosticist service: Not on file       Active member of club or organization: Not on file       Attends meetings of clubs or organizations: Not on file       Relationship status: Not on file    Intimate partner violence:       Fear of current or ex partner: Not on file       Emotionally abused: Not on file       Physically abused: Not on file       Forced sexual activity: Not on file   Other Topics Concern    Not on file   Social History Narrative    Not on file      History reviewed. No pertinent family history.     Review of Systems  Constitutional: Negative  Skin: Negative skin ROS  Eyes: Eyes negative  ENT: HENT negative  Respiratory: Respiratory negative  Cardiovascular: Positive for hypertension. GI: Neg GI ROS  Genitourinary: Positive for urinary burning, recurrent UTIs, history of urolithiasis, nocturia, urgency, leakage w/ urge and frequent urination. Musculoskeletal: Positive for back pain and arthralgias. Neurological: Positive for dizziness. Endocrine: Positive for cold intolerance, thirst and excessive urination. Hem/Lymphatic: Hematologic/lymphatic negative     PHYSICAL EXAM     Visit Vitals  /82 (BP 1 Location: Left arm, BP Patient Position: Sitting)   Pulse 82   Temp 98.1 °F (36.7 °C) (Oral)   Ht 5' 3\" (1.6 m)   Wt 179 lb (81.2 kg)   BMI 31.71 kg/m²         General appearance - well appearing and in no distress  Mental status - alert, oriented to person, place, and time  Neck - supple  Chest/Lung-  Quiet, even and easy respiratory effort without use of accessory muscles, CTAB  CV- RRR, S1 S2  Neurological - normal speech, no focal findings or movement disorder noted on gross visual exam  Musculoskeletal - normal gait and station  Skin - normal coloration and turgor, no rashes     Female Genitourinary     External Genitalia  Urethra: Characteristics- Normal with no masses, tenderness or scarring and Hypermobility, very mild urethrocele?    Vulva: Characteristics- Normal, Lesions- None     Speculum  Rectocele- Negative  Cystocele- Negative  Vaginal Lesions- None   Vaginal Mucosa- Normal        Urinalysis  UA - Dipstick Results for orders placed or performed in visit on 02/19/20   AMB POC URINALYSIS DIP STICK AUTO W/ MICRO (PGU)     Status: None   Result Value Ref Range Status     Glucose (UA POC) Negative Negative mg/dL Final     Bilirubin (UA POC) Negative Negative Final     Ketones (UA POC) Negative Negative Final     Specific gravity (UA POC) 1.015 1.001 - 1.035 Final     Blood (UA POC) Trace Negative Final     pH (UA POC) 6.0 4.6 - 8.0 Final     Protein (UA POC) Negative Negative Final     Urobilinogen (POC) 0.2 mg/dL   Final     Nitrites (UA POC) Negative Negative Final     Leukocyte esterase (UA POC) Trace Negative Final         UA - Micro  WBC - 0-3  RBC - 0-3  Bacteria - 0  Epith - 0        Assessment and Plan      ICD-10-CM ICD-9-CM     1. Recurrent UTI N39.0 599.0 AMB POC URINALYSIS DIP STICK AUTO W/ MICRO (PGU)         AMB POC PVR, DAMIEN,POST-VOID RES,US,NON-IMAGING      Recurrent E. Coli UTIs, no vaginal atrophy on exam, no significant urge incontinence. UA with trace blood (only 3-5 seen under scope).    Will start uribel for bladder symptoms. Advised pt to start wearing cotton underwear daily. Will follow urine culture and treat accordingly, will call pt with results. Given the frequency of her E. Coli UTIs and hx of renal stones, we will go ahead and check CT urogram to check for any kidney stones or upper tract abnormalities. Will call her with results and at that time will recommend cystoscopy to complete her workup. She has been instructed to call if symptoms fail to improve.       Dr. Christine Cooper is supervising physician today and he approves plan of care.     Yuliya Yao NP        Addendum: Pt's CT shows bilateral distal ureteral stones (3mm/3mm) without hydronephrosis. Urine culture negative for bacteria. Attempted to call pt, left voice mail. Recommend cystoscopy, bilateral URS, laser lithotripsy and possible bilateral ureteral stent insertion.

## 2020-03-05 NOTE — DISCHARGE INSTRUCTIONS
You will be called to schedule stent removal next week. Please call my office at [phone] with any questions/concerns. Ureteral Stent Placement: What to Expect at [address] [place] [address]  A ureteral (say \"you-REE-ter-ul\") stent is a thin, hollow tube that is placed in the ureter to help urine pass from the kidney into the bladder. Ureters are the tubes that connect the kidneys to the bladder. You may have a small amount of blood in your urine for 1 to 3 days after the procedure. While the stent is in place, you may have to urinate more often, feel a sudden need to urinate, or feel like you cannot completely empty your bladder. You may feel some pain when you urinate or do strenuous activity. You also may notice a small amount of blood in your urine after strenuous activities. These side effects usually do not prevent people from doing their normal daily activities. You may have a string attached to your stent. Do not to pull the string unless the doctor tells you to. The doctor will use the string to pull out the stent when you no longer need it. After the procedure, urine may flow better from your kidneys to your bladder. A ureteral stent may be left in place for several days or for as long as several months. Your doctor will take it out when you no longer need it. Cystoscopy: What to Expect at [address] [facility]  A cystoscopy is a procedure that lets a doctor look inside of the bladder and the urethra. The urethra is the tube that carries urine from the bladder to outside the body. The doctor uses a thin, lighted tube called a cystoscope to look for kidney or bladder stones, tumors, bleeding, or infection. After the cystoscopy, your urethra may be sore at first, and it may burn when you urinate for the first few days after the procedure. You may feel the need to urinate more often, and your urine may be pink. These symptoms should get better in 1 or 2 days.  You will probably be able to go back to work or most of your usual activities in 1 or 2 days. How can you care for yourself at home? Activity  · Rest when you feel tired. Getting enough sleep will help you recover. · Try to walk each day. Start by walking a little more than you did the day before. Bit by bit, increase the amount you walk. Walking boosts blood flow and helps prevent pneumonia and constipation. · Avoid strenuous activities, such as bicycle riding, jogging, weight lifting, or aerobic exercise, until your doctor says it is okay. · Ask your doctor when you can drive again. · Most people are able to return to work within 1 or 2 days after the procedure. · You may shower and take baths as usual.  · Ask your doctor when it is okay for you to have sex. Diet  · You can eat your normal diet. If your stomach is upset, try bland, low-fat foods like plain rice, broiled chicken, toast, and yogurt. · Drink plenty of fluids (unless your doctor tells you not to). Medicines  · Take pain medicines exactly as directed. ¨ If the doctor gave you a prescription medicine for pain, take it as prescribed. ¨ If you are not taking a prescription pain medicine, ask your doctor if you can take an over-the-counter medicine. · If you think your pain medicine is making you sick to your stomach:  ¨ Take your medicine after meals (unless your doctor has told you not to). ¨ Ask your doctor for a different pain medicine. · If your doctor prescribed antibiotics, take them as directed. Do not stop taking them just because you feel better. You need to take the full course of antibiotics. Follow-up care is a key part of your treatment and safety. Be sure to make and go to all appointments, and call your doctor if you are having problems. It's also a good idea to know your test results and keep a list of the medicines you take. When should you call for help? Call 911 anytime you think you may need emergency care.  For example, call if:  · You passed out (lost consciousness). · You have severe trouble breathing. · You have sudden chest pain and shortness of breath, or you cough up blood. · You have severe belly pain. Call your doctor now or seek immediate medical care if:  · You are sick to your stomach or cannot keep fluids down. · Your urine is still red or you see blood clots after you have urinated several times. · You have trouble passing urine or stool, especially if you have pain or swelling in your lower belly. · You have signs of a blood clot, such as:  ¨ Pain in your calf, back of the knee, thigh, or groin. ¨ Redness and swelling in your leg or groin. · You develop a fever or severe chills. · You have pain in your back just below your rib cage. This is called flank pain. Watch closely for changes in your health, and be sure to contact your doctor if:  · A burning feeling is normal for a day or two after the test, but call if it does not get better. · You have a frequent urge to urinate but can pass only small amounts of urine. · It is normal for the urine to have a pinkish color for a few days after the test, but call if it does not get better or if your urine is cloudy, smells bad, or has pus in it. After general anesthesia or intravenous sedation, for 24 hours or while taking prescription Narcotics:  · Limit your activities  · A responsible adult needs to be with you for the next 24 hours  · Do not drive and operate hazardous machinery  · Do not make important personal or business decisions  · Do not drink alcoholic beverages  · If you have not urinated within 8 hours after discharge, please contact your surgeon on call. · If you have sleep apnea and have a CPAP machine, please use it for all naps and sleeping. · Please use caution when taking narcotics and any of your home medications that may cause drowsiness. *  Please give a list of your current medications to your Primary Care Provider.   *  Please update this list whenever your medications are discontinued, doses are      changed, or new medications (including over-the-counter products) are added. *  Please carry medication information at all times in case of emergency situations. These are general instructions for a healthy lifestyle:  No smoking/ No tobacco products/ Avoid exposure to second hand smoke  Surgeon General's Warning:  Quitting smoking now greatly reduces serious risk to your health. Obesity, smoking, and sedentary lifestyle greatly increases your risk for illness  A healthy diet, regular physical exercise & weight monitoring are important for maintaining a healthy lifestyle    You may be retaining fluid if you have a history of heart failure or if you experience any of the following symptoms:  Weight gain of 3 pounds or more overnight or 5 pounds in a week, increased swelling in our hands or feet or shortness of breath while lying flat in bed. Please call your doctor as soon as you notice any of these symptoms; do not wait until your next office visit.

## 2020-03-05 NOTE — ANESTHESIA POSTPROCEDURE EVALUATION
Procedure(s):  CYSTOSCOPY BILATERAL RETROGRADE PYELOGRAMS,  AND BILATERAL URETEROSCOPY LASER LITHO BILATERAL STENT PLACEMENT.     general    Anesthesia Post Evaluation      Multimodal analgesia: multimodal analgesia not used between 6 hours prior to anesthesia start to PACU discharge  Patient location during evaluation: PACU  Patient participation: complete - patient participated  Level of consciousness: awake and alert  Pain management: adequate  Airway patency: patent  Anesthetic complications: no  Cardiovascular status: hemodynamically stable  Respiratory status: acceptable  Hydration status: acceptable        Vitals Value Taken Time   /73 3/5/2020  1:47 PM   Temp 36.4 °C (97.5 °F) 3/5/2020  1:26 PM   Pulse 92 3/5/2020  2:08 PM   Resp 14 3/5/2020  1:26 PM   SpO2 97 % 3/5/2020  2:08 PM

## 2020-03-05 NOTE — ANESTHESIA PREPROCEDURE EVALUATION
Relevant Problems   No relevant active problems       Anesthetic History               Review of Systems / Medical History  Patient summary reviewed and pertinent labs reviewed    Pulmonary    COPD: mild               Neuro/Psych              Cardiovascular    Hypertension: well controlled              Exercise tolerance: <4 METS     GI/Hepatic/Renal     GERD: well controlled           Endo/Other             Other Findings              Physical Exam    Airway  Mallampati: I  TM Distance: > 6 cm  Neck ROM: normal range of motion   Mouth opening: Normal     Cardiovascular    Rhythm: regular  Rate: normal         Dental    Dentition: Full lower dentures and Full upper dentures     Pulmonary  Breath sounds clear to auscultation               Abdominal         Other Findings            Anesthetic Plan    ASA: 3  Anesthesia type: general            Anesthetic plan and risks discussed with: Patient

## 2020-03-11 ENCOUNTER — HOSPITAL ENCOUNTER (OUTPATIENT)
Dept: SURGERY | Age: 70
Discharge: HOME | End: 2020-03-11

## 2020-03-11 NOTE — PERIOP NOTES
Pt did not show for 0800 PAT appointment. Left voicemail with patient to return call to [phone]. [facility] informing that pt did not show for 0800 appointment.

## 2020-03-12 ENCOUNTER — HOSPITAL ENCOUNTER (OUTPATIENT)
Dept: SURGERY | Age: 70
Discharge: HOME | End: 2020-03-12
Payer: MEDICARE

## 2020-03-12 VITALS
RESPIRATION RATE: 16 BRPM | HEIGHT: 66 IN | SYSTOLIC BLOOD PRESSURE: 114 MMHG | DIASTOLIC BLOOD PRESSURE: 64 MMHG | WEIGHT: 177.9 LBS | BODY MASS INDEX: 28.59 KG/M2 | OXYGEN SATURATION: 93 % | HEART RATE: 102 BPM | TEMPERATURE: 97 F

## 2020-03-12 LAB
ANION GAP SERPL CALC-SCNC: 7 MMOL/L (ref 7–16)
BACTERIA SPEC CULT: NORMAL
BUN SERPL-MCNC: 24 MG/DL (ref 8–23)
CALCIUM SERPL-MCNC: 9.5 MG/DL (ref 8.3–10.4)
CHLORIDE SERPL-SCNC: 105 MMOL/L (ref 98–107)
CO2 SERPL-SCNC: 24 MMOL/L (ref 21–32)
CREAT SERPL-MCNC: 0.82 MG/DL (ref 0.6–1)
ERYTHROCYTE [DISTWIDTH] IN BLOOD BY AUTOMATED COUNT: 13.9 % (ref 11.9–14.6)
GLUCOSE SERPL-MCNC: 106 MG/DL (ref 65–100)
HCT VFR BLD AUTO: 42.7 % (ref 35.8–46.3)
HGB BLD-MCNC: 13.7 G/DL (ref 11.7–15.4)
MCH RBC QN AUTO: 29.3 PG (ref 26.1–32.9)
MCHC RBC AUTO-ENTMCNC: 32.1 G/DL (ref 31.4–35)
MCV RBC AUTO: 91.4 FL (ref 79.6–97.8)
NRBC # BLD: 0 K/UL (ref 0–0.2)
PLATELET # BLD AUTO: 265 K/UL (ref 150–450)
PMV BLD AUTO: 10.6 FL (ref 9.4–12.3)
POTASSIUM SERPL-SCNC: 4 MMOL/L (ref 3.5–5.1)
RBC # BLD AUTO: 4.67 M/UL (ref 4.05–5.2)
SERVICE CMNT-IMP: NORMAL
SODIUM SERPL-SCNC: 136 MMOL/L (ref 136–145)
WBC # BLD AUTO: 7.1 K/UL (ref 4.3–11.1)

## 2020-03-12 PROCEDURE — 85027 COMPLETE CBC AUTOMATED: CPT

## 2020-03-12 PROCEDURE — 80048 BASIC METABOLIC PNL TOTAL CA: CPT

## 2020-03-12 PROCEDURE — 87641 MR-STAPH DNA AMP PROBE: CPT

## 2020-03-12 RX ORDER — TURMERIC 400 MG
1 CAPSULE ORAL DAILY
COMMUNITY

## 2020-03-12 NOTE — PERIOP NOTES
Patient verified name and . Order for consent found in EHR and matches case posting; patient verified. Right Total Knee Arthroplasty    Type 3 surgery, PAT walk in assessment complete. Labs per surgeon: no PAT lab orders in EHR. Labs per anesthesia protocol: CBC and BMP; results pending. EKG: Done 19- within anesthesia guidelines. MRSA/MSSA swab collected; pharmacy to review and dose antibiotic as appropriate. Hospital approved surgical skin cleanser and instructions to return bottle on DOS given per hospital policy. Patient provided with handouts including Guide to Surgery, Pain Management, Hand Hygiene, Blood Transfusion Education, and [facility] Brochure. Patient answered medical/surgical history questions at their best of ability. All prior to admission medications documented in [facility]. Original medication prescription bottle not visualized during patient appointment. Patient instructed to hold all vitamins 3 weeks prior to surgery and NSAIDS 5 days prior to surgery. Patient teach back successful and patient demonstrates knowledge of instruction.

## 2020-03-12 NOTE — H&P
[facility]  Pre Operative History and Physical Exam    Patient ID:  Shaina Padilla  [id number]  4 y.o.  1950    Today: March 12, 2020       Assessment:   1. Arthritis of the right knee        Plan:    1. Proceed with scheduled Procedure(s) (LRB):  RIGHT TARAS ROBOTIC TOTAL KNEE ARTHROPLASTY CEMENTED TKA SPINAL/ ADDUCTOR CANAL BLOCK DENNYS (Right)            CC:  Right knee pain    HPI:   The patient has end stage arthritis of the right knee. The patient was evaluated and examined during a consultation prior to this office visit. There have been no changes to the patient's orthopedic condition since the initial consultation. The patient has failed previous conservative treatment for this condition including antiinflammatories , and lifestyle modifications. The necessity for joint replacement is present. The patient will be admitted the day of surgery for Procedure(s) (LRB):  RIGHT TARAS ROBOTIC TOTAL KNEE ARTHROPLASTY CEMENTED TKA SPINAL/ ADDUCTOR CANAL BLOCK DENNYS (Right)      Past Medical/Surgical History:  Past Medical History:   Diagnosis Date    Adverse effect of anesthesia     \"hard to knock me out sometimes\"    Arthritis     Claustrophobia     mild    Former cigarette smoker     GERD (gastroesophageal reflux disease)     occassionally-managed with PRN OTC meds    History of melanoma     X2    Hypertension     managed with medication    Ureteral stone      Past Surgical History:   Procedure Laterality Date    HX CATARACT REMOVAL Bilateral 12/2018,01/2019    HX LITHOTRIPSY  03/05/2020    HX OTHER SURGICAL      melanoma removed from RLE     HX TUBAL LIGATION      11/11/2019-\"40 yrs ago\"        Allergies:    Allergies   Allergen Reactions    Codeine Nausea and Vomiting     Increased heart rate        Physical Exam:   General: NAD, Alert, Oriented, Appears their stated age     [de-identified]: NC/AT, PERRL    Skin: No rashes, lesions or wounds seen      Psych: normal affect      Heart: Regular Rate, Rhythm     Lungs: unlabored respirations, normal breath sounds     Abdomen: Soft and non-distended     Ortho: Pain with limited ROM of the right knee    Neuro: no focal defects, sensation is equal bilaterally     Lymph: no lymphadenopathy     Meds:   No current facility-administered medications for this encounter. Current Outpatient Medications   Medication Sig    turmeric 400 mg cap Take 1 Tab by mouth daily.  phenazopyridine (PYRIDIUM) 200 mg tablet Take 1 Tab by mouth three (3) times daily as needed for Pain for up to 3 days.  hyoscyamine SL (LEVSIN/SL) 0.125 mg SL tablet 1 Tab by SubLINGual route every four (4) hours as needed for Cramping (bladder spasms).  oxyCODONE-acetaminophen (PERCOCET) 5-325 mg per tablet Take 1 Tab by mouth every four (4) hours as needed for Pain for up to 7 days. Max Daily Amount: 6 Tabs.  buPROPion XL (WELLBUTRIN XL) 150 mg tablet Take 150 mg by mouth two (2) times a day.  lisinopril (PRINIVIL, ZESTRIL) 10 mg tablet Take 10 mg by mouth nightly.  meclizine (ANTIVERT) 25 mg tablet Take 25 mg by mouth as needed.  carisoprodol (SOMA) 350 mg tablet Take 350 mg by mouth as needed for Muscle Spasm(s).  cholecalciferol, vitamin D3, (VITAMIN D3 PO) Take 1 Tab by mouth two (2) times a day.  ginkgo biloba (GINKOBA PO) Take 120 mg by mouth two (2) times a day.  folic acid/multivit-min/lutein (CENTRUM SILVER PO) Take 1 Tab by mouth daily.  ascorbic acid, vitamin C, (VITAMIN C) 250 mg tablet Take 750 mg by mouth daily.  ibuprofen (ADVIL) 200 mg tablet Take 200 mg by mouth every six (6) hours as needed for Pain.          Labs:  Hospital Outpatient Visit on 03/12/2020   Component Date Value Ref Range Status    WBC 03/12/2020 7.1  4.3 - 11.1 K/uL Final    RBC 03/12/2020 4.67  4.05 - 5.2 M/uL Final    HGB 03/12/2020 13.7  11.7 - 15.4 g/dL Final    HCT 03/12/2020 42.7  35.8 - 46.3 % Final    MCV 03/12/2020 91.4  79.6 - 97.8 FL Final    MCH 03/12/2020 29.3  26.1 - 32.9 PG Final    MCHC 03/12/2020 32.1  31.4 - 35.0 g/dL Final    RDW 03/12/2020 13.9  11.9 - 14.6 % Final    PLATELET 61/71/6672 262  150 - 450 K/uL Final    MPV 03/12/2020 10.6  9.4 - 12.3 FL Final    ABSOLUTE NRBC 03/12/2020 0.00  0.0 - 0.2 K/uL Final    **Note: Absolute NRBC parameter is now reported with Hemogram**    Sodium 03/12/2020 136  136 - 145 mmol/L Final    Potassium 03/12/2020 4.0  3.5 - 5.1 mmol/L Final    Chloride 03/12/2020 105  98 - 107 mmol/L Final    CO2 03/12/2020 24  21 - 32 mmol/L Final    Anion gap 03/12/2020 7  7 - 16 mmol/L Final    Glucose 03/12/2020 106* 65 - 100 mg/dL Final    Comment: 47 - 60 mg/dl Consistent with, but not fully diagnostic of hypoglycemia. 101 - 125 mg/dl Impaired fasting glucose/consistent with pre-diabetes mellitus  > 126 mg/dl Fasting glucose consistent with overt diabetes mellitus      BUN 03/12/2020 24* 8 - 23 MG/DL Final    Creatinine 03/12/2020 0.82  0.6 - 1.0 MG/DL Final    GFR est AA 03/12/2020 >60  >60 ml/min/1.73m2 Final    GFR est non-AA 03/12/2020 >60  >60 ml/min/1.73m2 Final    Comment: (NOTE)  Estimated GFR is calculated using the Modification of Diet in Renal   Disease (MDRD) Study equation, reported for both  Americans   (GFRAA) and non- Americans (GFRNA), and normalized to 1.73m2   body surface area. The physician must decide which value applies to   the patient. The MDRD study equation should only be used in   individuals age 25 or older. It has not been validated for the   following: pregnant women, patients with serious comorbid conditions,   or on certain medications, or persons with extremes of body size,   muscle mass, or nutritional status.  Calcium 03/12/2020 9.5  8.3 - 10.4 MG/DL Final    Special Requests: 03/12/2020 NASAL    Final    Culture result: 03/12/2020 SA target not detected.                                  A MRSA NEGATIVE, SA NEGATIVE test result does not preclude MRSA or SA nasal colonization. Final   Office Visit on 03/09/2020   Component Date Value Ref Range Status    Glucose (UA POC) 03/09/2020 Negative  Negative mg/dL Final    Bilirubin (UA POC) 03/09/2020 Small  Negative Final    Ketones (UA POC) 03/09/2020 Trace  Negative Final    Specific gravity (UA POC) 03/09/2020 1.030  1.001 - 1.035 Final    Blood (UA POC) 03/09/2020 Large  Negative Final    pH (UA POC) 03/09/2020 6.5  4.6 - 8.0 Final    Protein (UA POC) 03/09/2020 30   Negative Final    Urobilinogen (POC) 03/09/2020 0.2 mg/dL   Final    Nitrites (UA POC) 03/09/2020 Negative  Negative Final    Leukocyte esterase (UA POC) 03/09/2020 Large  Negative Final   Admission on 03/05/2020, Discharged on 03/05/2020   Component Date Value Ref Range Status    Potassium, POC 03/05/2020 3.9  3.5 - 5.1 MMOL/L Final   Office Visit on 02/19/2020   Component Date Value Ref Range Status    Glucose (UA POC) 02/19/2020 Negative  Negative mg/dL Final    Bilirubin (UA POC) 02/19/2020 Negative  Negative Final    Ketones (UA POC) 02/19/2020 Negative  Negative Final    Specific gravity (UA POC) 02/19/2020 1.015  1.001 - 1.035 Final    Blood (UA POC) 02/19/2020 Trace  Negative Final    pH (UA POC) 02/19/2020 6.0  4.6 - 8.0 Final    Protein (UA POC) 02/19/2020 Negative  Negative Final    Urobilinogen (POC) 02/19/2020 0.2 mg/dL   Final    Nitrites (UA POC) 02/19/2020 Negative  Negative Final    Leukocyte esterase (UA POC) 02/19/2020 Trace  Negative Final    PVR 02/19/2020 6  cc Final    Urine Culture, Routine 02/19/2020 No growth   Final                 There is no problem list on file for this patient.         Signed By: Emil Pichardo MD  March 12, 2020

## 2020-03-12 NOTE — PERIOP NOTES
PLEASE CONTINUE TAKING ALL PRESCRIPTION MEDICATIONS UP TO THE DAY OF SURGERY UNLESS OTHERWISE DIRECTED BELOW. DISCONTINUE all vitamins and supplements 21 days prior to surgery. DISCONTINUE Non-Steriodal Anti-Inflammatory (NSAIDS) such as Advil and Aleve 5 days prior to surgery. Home Medications to take  the day of surgery    Wellbutrin, Soma if needed            Home Medications   to Hold   Stop advil, vitamins and supplements now        Comments    On the day before surgery take Acetaminophen 1000mg in the morning and at bedtime OR Acetaminophen 650mg in the morning, afternoon and bedtime             Please do not bring home medications with you on the day of surgery unless otherwise directed by your nurse. If you are instructed to bring home medications, please give them to your nurse as they will be administered by the nursing staff. If you have any questions, please call Venu Lamb [phone]. A copy of this note was provided to the patient for reference.

## 2020-03-12 NOTE — PERIOP NOTES
Lab results within anesthesia guidelines. Lab results sent to PCP per surgeon's request.       Recent Results (from the past 12 hour(s))   CBC W/O DIFF    Collection Time: 03/12/20 12:23 PM   Result Value Ref Range    WBC 7.1 4.3 - 11.1 K/uL    RBC 4.67 4.05 - 5.2 M/uL    HGB 13.7 11.7 - 15.4 g/dL    HCT 42.7 35.8 - 46.3 %    MCV 91.4 79.6 - 97.8 FL    MCH 29.3 26.1 - 32.9 PG    MCHC 32.1 31.4 - 35.0 g/dL    RDW 13.9 11.9 - 14.6 %    PLATELET 520 498 - 145 K/uL    MPV 10.6 9.4 - 12.3 FL    ABSOLUTE NRBC 0.00 0.0 - 0.2 K/uL   METABOLIC PANEL, BASIC    Collection Time: 03/12/20 12:23 PM   Result Value Ref Range    Sodium 136 136 - 145 mmol/L    Potassium 4.0 3.5 - 5.1 mmol/L    Chloride 105 98 - 107 mmol/L    CO2 24 21 - 32 mmol/L    Anion gap 7 7 - 16 mmol/L    Glucose 106 (H) 65 - 100 mg/dL    BUN 24 (H) 8 - 23 MG/DL    Creatinine 0.82 0.6 - 1.0 MG/DL    GFR est AA >60 >60 ml/min/1.73m2    GFR est non-AA >60 >60 ml/min/1.73m2    Calcium 9.5 8.3 - 10.4 MG/DL   MSSA/MRSA SC BY PCR, NASAL SWAB    Collection Time: 03/12/20 12:23 PM   Result Value Ref Range    Special Requests: NASAL      Culture result:        SA target not detected. A MRSA NEGATIVE, SA NEGATIVE test result does not preclude MRSA or SA nasal colonization.

## 2020-03-12 NOTE — ADVANCED PRACTICE NURSE
Total Joint Surgery Preoperative Chart Review      Patient ID:  Lorenzo Ahn  [id number]  43 y.o.  1950  Surgeon: Dr. Deanne Irby  Date of Surgery: The linked surgery was not found. Please check manually. Procedure: Total Right Knee Arthroplasty  Primary Care Physician: Vidya Grove [phone]  Specialty Physician(s):      Subjective:   Lorenzo Ahn is a 77 y.o. WHITE OR  female who presents for preoperative evaluation for Total Right Knee arthroplasty. This is a preoperative chart review note based on data collected by the nurse at the surgical Pre-Assessment visit. Past Medical History:   Diagnosis Date    Adverse effect of anesthesia     \"hard to knock me out sometimes\"    Arthritis     Claustrophobia     mild    Former cigarette smoker     GERD (gastroesophageal reflux disease)     occassionally-managed with PRN OTC meds    History of melanoma     X2    Hypertension     managed with medication    Ureteral stone       Past Surgical History:   Procedure Laterality Date    HX CATARACT REMOVAL Bilateral 2018,2019    HX LITHOTRIPSY  2020    HX OTHER SURGICAL      melanoma removed from RLE     HX TUBAL LIGATION      2019-\"40 yrs ago\"     Family History   Problem Relation Age of Onset    Cancer Mother         Bone CA    Heart Disease Mother     Heart Disease Father       Social History     Tobacco Use    Smoking status: Former Smoker     Packs/day: 0.25     Years: 20.00     Pack years: 5.00     Last attempt to quit: 2019     Years since quittin.7    Smokeless tobacco: Never Used   Substance Use Topics    Alcohol use: Yes     Comment: rarely       Prior to Admission medications    Medication Sig Start Date End Date Taking? Authorizing Provider   turmeric 400 mg cap Take 1 Tab by mouth daily. Yes Provider, Historical   buPROPion XL (WELLBUTRIN XL) 150 mg tablet Take 150 mg by mouth two (2) times a day.    Yes Provider, Historical   lisinopril (PRINIVIL, ZESTRIL) 10 mg tablet Take 10 mg by mouth nightly. Yes Provider, Historical   meclizine (ANTIVERT) 25 mg tablet Take 25 mg by mouth as needed. Yes Provider, Historical   carisoprodol (SOMA) 350 mg tablet Take 350 mg by mouth as needed for Muscle Spasm(s). Yes Provider, Historical   cholecalciferol, vitamin D3, (VITAMIN D3 PO) Take 1 Tab by mouth two (2) times a day. Yes Provider, Historical   ginkgo biloba (GINKOBA PO) Take 120 mg by mouth two (2) times a day. Yes Provider, Historical   folic acid/multivit-min/lutein (CENTRUM SILVER PO) Take 1 Tab by mouth daily. Yes Provider, Historical   ascorbic acid, vitamin C, (VITAMIN C) 250 mg tablet Take 750 mg by mouth daily. Yes Provider, Historical   ibuprofen (ADVIL) 200 mg tablet Take 200 mg by mouth every six (6) hours as needed for Pain. Yes Provider, Historical   phenazopyridine (PYRIDIUM) 200 mg tablet Take 1 Tab by mouth three (3) times daily as needed for Pain for up to 3 days. 3/9/20 3/12/20  BERNADETTE Dhillon   hyoscyamine SL (LEVSIN/SL) 0.125 mg SL tablet 1 Tab by SubLINGual route every four (4) hours as needed for Cramping (bladder spasms). 3/5/20   Telma Lazaro MD   oxyCODONE-acetaminophen (PERCOCET) 5-325 mg per tablet Take 1 Tab by mouth every four (4) hours as needed for Pain for up to 7 days. Max Daily Amount: 6 Tabs.  3/5/20 3/12/20  Telma Lazaro MD     Allergies   Allergen Reactions    Codeine Nausea and Vomiting     Increased heart rate          Objective:     Physical Exam:   Patient Vitals for the past 24 hrs:   Temp Pulse Resp BP SpO2   03/12/20 1229 97 °F (36.1 °C) (!) 102 16 114/64 93 %       ECG:    EKG Results     None          Data Review:   Labs:   Recent Results (from the past 24 hour(s))   CBC W/O DIFF    Collection Time: 03/12/20 12:23 PM   Result Value Ref Range    WBC 7.1 4.3 - 11.1 K/uL    RBC 4.67 4.05 - 5.2 M/uL    HGB 13.7 11.7 - 15.4 g/dL    HCT 42.7 35.8 - 46.3 %    MCV 91.4 79.6 - 97.8 FL MCH 29.3 26.1 - 32.9 PG    MCHC 32.1 31.4 - 35.0 g/dL    RDW 13.9 11.9 - 14.6 %    PLATELET 672 172 - 002 K/uL    MPV 10.6 9.4 - 12.3 FL    ABSOLUTE NRBC 0.00 0.0 - 0.2 K/uL   METABOLIC PANEL, BASIC    Collection Time: 03/12/20 12:23 PM   Result Value Ref Range    Sodium 136 136 - 145 mmol/L    Potassium 4.0 3.5 - 5.1 mmol/L    Chloride 105 98 - 107 mmol/L    CO2 24 21 - 32 mmol/L    Anion gap 7 7 - 16 mmol/L    Glucose 106 (H) 65 - 100 mg/dL    BUN 24 (H) 8 - 23 MG/DL    Creatinine 0.82 0.6 - 1.0 MG/DL    GFR est AA >60 >60 ml/min/1.73m2    GFR est non-AA >60 >60 ml/min/1.73m2    Calcium 9.5 8.3 - 10.4 MG/DL         Problem List:  )There is no problem list on file for this patient. Total Joint Surgery Pre-Assessment Recommendations:           Recommend continuous saturation monitoring hours of sleep, during hospitalization.       Signed By: BRANDON RosenbergC    March 12, 2020

## 2020-03-17 ENCOUNTER — ANESTHESIA EVENT (OUTPATIENT)
Dept: SURGERY | Age: 70
DRG: 470 | End: 2020-03-17
Payer: MEDICARE

## 2020-03-18 ENCOUNTER — ANESTHESIA (OUTPATIENT)
Dept: SURGERY | Age: 70
DRG: 470 | End: 2020-03-18
Payer: MEDICARE

## 2020-03-18 ENCOUNTER — HOSPITAL ENCOUNTER (INPATIENT)
Age: 70
LOS: 2 days | Discharge: HOME HEALTH | DRG: 470 | End: 2020-03-20
Attending: ORTHOPAEDIC SURGERY | Admitting: ORTHOPAEDIC SURGERY
Payer: MEDICARE

## 2020-03-18 DIAGNOSIS — Z96.651 STATUS POST TOTAL RIGHT KNEE REPLACEMENT: Primary | ICD-10-CM

## 2020-03-18 PROBLEM — M19.90 OSTEOARTHRITIS: Status: ACTIVE | Noted: 2020-03-18

## 2020-03-18 LAB
GLUCOSE BLD STRIP.AUTO-MCNC: 93 MG/DL (ref 65–100)
HGB BLD-MCNC: 12.2 G/DL (ref 11.7–15.4)

## 2020-03-18 PROCEDURE — 74011250636 HC RX REV CODE- 250/636: Performed by: NURSE ANESTHETIST, CERTIFIED REGISTERED

## 2020-03-18 PROCEDURE — 77030013708 HC HNDPC SUC IRR PULS STRY –B: Performed by: ORTHOPAEDIC SURGERY

## 2020-03-18 PROCEDURE — 74011000250 HC RX REV CODE- 250: Performed by: ANESTHESIOLOGY

## 2020-03-18 PROCEDURE — 77030038149 HC BLD SAW SAG STRY -D: Performed by: ORTHOPAEDIC SURGERY

## 2020-03-18 PROCEDURE — 77030029820: Performed by: ORTHOPAEDIC SURGERY

## 2020-03-18 PROCEDURE — 77030003602 HC NDL NRV BLK BBMI -B: Performed by: ANESTHESIOLOGY

## 2020-03-18 PROCEDURE — 85018 HEMOGLOBIN: CPT

## 2020-03-18 PROCEDURE — C1776 JOINT DEVICE (IMPLANTABLE): HCPCS | Performed by: ORTHOPAEDIC SURGERY

## 2020-03-18 PROCEDURE — 76010010054 HC POST OP PAIN BLOCK: Performed by: ORTHOPAEDIC SURGERY

## 2020-03-18 PROCEDURE — 94762 N-INVAS EAR/PLS OXIMTRY CONT: CPT

## 2020-03-18 PROCEDURE — 97161 PT EVAL LOW COMPLEX 20 MIN: CPT

## 2020-03-18 PROCEDURE — 76942 ECHO GUIDE FOR BIOPSY: CPT | Performed by: ORTHOPAEDIC SURGERY

## 2020-03-18 PROCEDURE — 74011250636 HC RX REV CODE- 250/636: Performed by: ANESTHESIOLOGY

## 2020-03-18 PROCEDURE — 77030008462 HC STPLR SKN PROX J&J -A: Performed by: ORTHOPAEDIC SURGERY

## 2020-03-18 PROCEDURE — 74011250636 HC RX REV CODE- 250/636: Performed by: ORTHOPAEDIC SURGERY

## 2020-03-18 PROCEDURE — 77030040922 HC BLNKT HYPOTHRM STRY -A: Performed by: ANESTHESIOLOGY

## 2020-03-18 PROCEDURE — 74011250637 HC RX REV CODE- 250/637: Performed by: ANESTHESIOLOGY

## 2020-03-18 PROCEDURE — 77030035236 HC SUT PDS STRATFX BARB J&J -B: Performed by: ORTHOPAEDIC SURGERY

## 2020-03-18 PROCEDURE — 77030002912 HC SUT ETHBND J&J -A: Performed by: ORTHOPAEDIC SURGERY

## 2020-03-18 PROCEDURE — 77030013819 HC MX SYS CEM ZIMM -B: Performed by: ORTHOPAEDIC SURGERY

## 2020-03-18 PROCEDURE — 8E0Y3CZ ROBOTIC ASSISTED PROCEDURE OF LOWER EXTREMITY, PERCUTANEOUS APPROACH: ICD-10-PCS | Performed by: ORTHOPAEDIC SURGERY

## 2020-03-18 PROCEDURE — 77030031139 HC SUT VCRL2 J&J -A: Performed by: ORTHOPAEDIC SURGERY

## 2020-03-18 PROCEDURE — 77030040361 HC SLV COMPR DVT MDII -B

## 2020-03-18 PROCEDURE — C1713 ANCHOR/SCREW BN/BN,TIS/BN: HCPCS | Performed by: ORTHOPAEDIC SURGERY

## 2020-03-18 PROCEDURE — 97110 THERAPEUTIC EXERCISES: CPT

## 2020-03-18 PROCEDURE — 77030036688 HC BLNKT CLD THER S2SG -B

## 2020-03-18 PROCEDURE — 74011000258 HC RX REV CODE- 258: Performed by: ORTHOPAEDIC SURGERY

## 2020-03-18 PROCEDURE — 76010000876 HC OR TIME 2 TO 2.5HR INTENSV - TIER 2: Performed by: ORTHOPAEDIC SURGERY

## 2020-03-18 PROCEDURE — 82962 GLUCOSE BLOOD TEST: CPT

## 2020-03-18 PROCEDURE — 74011250637 HC RX REV CODE- 250/637: Performed by: PHYSICIAN ASSISTANT

## 2020-03-18 PROCEDURE — 36415 COLL VENOUS BLD VENIPUNCTURE: CPT

## 2020-03-18 PROCEDURE — 77030029828 HC FEM TIB CKPNT KT DISP STRY -B: Performed by: ORTHOPAEDIC SURGERY

## 2020-03-18 PROCEDURE — 77010033678 HC OXYGEN DAILY

## 2020-03-18 PROCEDURE — 97535 SELF CARE MNGMENT TRAINING: CPT

## 2020-03-18 PROCEDURE — 76060000035 HC ANESTHESIA 2 TO 2.5 HR: Performed by: ORTHOPAEDIC SURGERY

## 2020-03-18 PROCEDURE — 77030003665 HC NDL SPN BBMI -A: Performed by: ANESTHESIOLOGY

## 2020-03-18 PROCEDURE — 77030019557 HC ELECTRD VES SEAL MEDT -F: Performed by: ORTHOPAEDIC SURGERY

## 2020-03-18 PROCEDURE — 74011000250 HC RX REV CODE- 250: Performed by: NURSE ANESTHETIST, CERTIFIED REGISTERED

## 2020-03-18 PROCEDURE — 77030002966 HC SUT PDS J&J -A: Performed by: ORTHOPAEDIC SURGERY

## 2020-03-18 PROCEDURE — 77030007880 HC KT SPN EPDRL BBMI -B: Performed by: ANESTHESIOLOGY

## 2020-03-18 PROCEDURE — 97165 OT EVAL LOW COMPLEX 30 MIN: CPT

## 2020-03-18 PROCEDURE — 74011250636 HC RX REV CODE- 250/636: Performed by: PHYSICIAN ASSISTANT

## 2020-03-18 PROCEDURE — 77030012935 HC DRSG AQUACEL BMS -B: Performed by: ORTHOPAEDIC SURGERY

## 2020-03-18 PROCEDURE — 74011000250 HC RX REV CODE- 250: Performed by: ORTHOPAEDIC SURGERY

## 2020-03-18 PROCEDURE — 77030018836 HC SOL IRR NACL ICUM -A: Performed by: ORTHOPAEDIC SURGERY

## 2020-03-18 PROCEDURE — 77030011208: Performed by: ORTHOPAEDIC SURGERY

## 2020-03-18 PROCEDURE — 77030010509 HC AIRWY LMA MSK TELE -A: Performed by: ANESTHESIOLOGY

## 2020-03-18 PROCEDURE — 65270000029 HC RM PRIVATE

## 2020-03-18 PROCEDURE — 76210000016 HC OR PH I REC 1 TO 1.5 HR: Performed by: ORTHOPAEDIC SURGERY

## 2020-03-18 PROCEDURE — 0SRC0J9 REPLACEMENT OF RIGHT KNEE JOINT WITH SYNTHETIC SUBSTITUTE, CEMENTED, OPEN APPROACH: ICD-10-PCS | Performed by: ORTHOPAEDIC SURGERY

## 2020-03-18 PROCEDURE — 94760 N-INVAS EAR/PLS OXIMETRY 1: CPT

## 2020-03-18 DEVICE — SIMPLEX® HV IS A FAST-SETTING ACRYLIC RESIN FOR USE IN BONE SURGERY. MIXING THE TWO SEPARATE STERILE COMPONENTS PRODUCES A DUCTILE BONE CEMENT WHICH, AFTER HARDENING, FIXES THE IMPLANT AND TRANSFERS STRESSES PRODUCED DURING MOVEMENT EVENLY TO THE BONE. SIMPLEX® HV CEMENT POWDER ALSO CONTAINS INSOLUBLE ZIRCONIUM DIOXIDE AS AN X-RAY CONTRAST MEDIUM. SIMPLEX® HV DOES NOT EMIT A SIGNAL AND DOES NOT POSE A SAFETY RISK IN A MAGNETIC RESONANCE ENVIRONMENT.
Type: IMPLANTABLE DEVICE | Site: KNEE | Status: FUNCTIONAL
Brand: SIMPLEX HV

## 2020-03-18 DEVICE — POSTERIOR STABILIZED FEMORAL
Type: IMPLANTABLE DEVICE | Site: KNEE | Status: FUNCTIONAL
Brand: TRIATHLON

## 2020-03-18 DEVICE — TIBIAL BEARING INSERT - PS
Type: IMPLANTABLE DEVICE | Site: KNEE | Status: FUNCTIONAL
Brand: TRIATHLON

## 2020-03-18 DEVICE — FEMORAL DISTAL FIXATION PEG
Type: IMPLANTABLE DEVICE | Site: KNEE | Status: FUNCTIONAL
Brand: TRIATHLON

## 2020-03-18 DEVICE — COMPONENT TOT KNEE CEM PRIMARY TRIATHLON: Type: IMPLANTABLE DEVICE | Status: FUNCTIONAL

## 2020-03-18 DEVICE — UNIVERSAL TIBIAL BASEPLATE
Type: IMPLANTABLE DEVICE | Site: KNEE | Status: FUNCTIONAL
Brand: TRIATHLON

## 2020-03-18 RX ORDER — MIDAZOLAM HYDROCHLORIDE 1 MG/ML
2 INJECTION, SOLUTION INTRAMUSCULAR; INTRAVENOUS ONCE
Status: COMPLETED | OUTPATIENT
Start: 2020-03-18 | End: 2020-03-18

## 2020-03-18 RX ORDER — MECLIZINE HCL 12.5 MG 12.5 MG/1
25 TABLET ORAL
Status: DISCONTINUED | OUTPATIENT
Start: 2020-03-18 | End: 2020-03-20 | Stop reason: HOSPADM

## 2020-03-18 RX ORDER — HYDROMORPHONE HYDROCHLORIDE 2 MG/1
2 TABLET ORAL
Qty: 40 TAB | Refills: 0 | Status: SHIPPED | OUTPATIENT
Start: 2020-03-18 | End: 2020-03-25

## 2020-03-18 RX ORDER — EPHEDRINE SULFATE/0.9% NACL/PF 50 MG/5 ML
SYRINGE (ML) INTRAVENOUS AS NEEDED
Status: DISCONTINUED | OUTPATIENT
Start: 2020-03-18 | End: 2020-03-18 | Stop reason: HOSPADM

## 2020-03-18 RX ORDER — MIDAZOLAM HYDROCHLORIDE 1 MG/ML
2 INJECTION, SOLUTION INTRAMUSCULAR; INTRAVENOUS
Status: DISCONTINUED | OUTPATIENT
Start: 2020-03-18 | End: 2020-03-18 | Stop reason: HOSPADM

## 2020-03-18 RX ORDER — BACITRACIN 50000 [IU]/1
INJECTION, POWDER, FOR SOLUTION INTRAMUSCULAR AS NEEDED
Status: DISCONTINUED | OUTPATIENT
Start: 2020-03-18 | End: 2020-03-18 | Stop reason: HOSPADM

## 2020-03-18 RX ORDER — SODIUM CHLORIDE 0.9 % (FLUSH) 0.9 %
5-40 SYRINGE (ML) INJECTION EVERY 8 HOURS
Status: DISCONTINUED | OUTPATIENT
Start: 2020-03-18 | End: 2020-03-20 | Stop reason: HOSPADM

## 2020-03-18 RX ORDER — DIPHENHYDRAMINE HCL 25 MG
25 CAPSULE ORAL
Status: DISCONTINUED | OUTPATIENT
Start: 2020-03-18 | End: 2020-03-20 | Stop reason: HOSPADM

## 2020-03-18 RX ORDER — KETOROLAC TROMETHAMINE 30 MG/ML
INJECTION, SOLUTION INTRAMUSCULAR; INTRAVENOUS AS NEEDED
Status: DISCONTINUED | OUTPATIENT
Start: 2020-03-18 | End: 2020-03-18 | Stop reason: HOSPADM

## 2020-03-18 RX ORDER — SODIUM CHLORIDE 0.9 % (FLUSH) 0.9 %
5-40 SYRINGE (ML) INJECTION EVERY 8 HOURS
Status: DISCONTINUED | OUTPATIENT
Start: 2020-03-18 | End: 2020-03-18 | Stop reason: HOSPADM

## 2020-03-18 RX ORDER — LISINOPRIL 5 MG/1
10 TABLET ORAL
Status: DISCONTINUED | OUTPATIENT
Start: 2020-03-18 | End: 2020-03-20 | Stop reason: HOSPADM

## 2020-03-18 RX ORDER — ACETAMINOPHEN 10 MG/ML
1000 INJECTION, SOLUTION INTRAVENOUS
Status: COMPLETED | OUTPATIENT
Start: 2020-03-18 | End: 2020-03-19

## 2020-03-18 RX ORDER — ACETAMINOPHEN 500 MG
1000 TABLET ORAL EVERY 6 HOURS
Status: DISCONTINUED | OUTPATIENT
Start: 2020-03-18 | End: 2020-03-20 | Stop reason: HOSPADM

## 2020-03-18 RX ORDER — FAMOTIDINE 20 MG/1
20 TABLET, FILM COATED ORAL ONCE
Status: COMPLETED | OUTPATIENT
Start: 2020-03-18 | End: 2020-03-18

## 2020-03-18 RX ORDER — CEFAZOLIN SODIUM/WATER 2 G/20 ML
2 SYRINGE (ML) INTRAVENOUS EVERY 8 HOURS
Status: COMPLETED | OUTPATIENT
Start: 2020-03-18 | End: 2020-03-19

## 2020-03-18 RX ORDER — LIDOCAINE HYDROCHLORIDE 10 MG/ML
0.1 INJECTION INFILTRATION; PERINEURAL AS NEEDED
Status: DISCONTINUED | OUTPATIENT
Start: 2020-03-18 | End: 2020-03-18 | Stop reason: HOSPADM

## 2020-03-18 RX ORDER — MIDAZOLAM HYDROCHLORIDE 1 MG/ML
INJECTION, SOLUTION INTRAMUSCULAR; INTRAVENOUS AS NEEDED
Status: DISCONTINUED | OUTPATIENT
Start: 2020-03-18 | End: 2020-03-18 | Stop reason: HOSPADM

## 2020-03-18 RX ORDER — ASPIRIN 81 MG/1
81 TABLET ORAL EVERY 12 HOURS
Status: DISCONTINUED | OUTPATIENT
Start: 2020-03-18 | End: 2020-03-20 | Stop reason: HOSPADM

## 2020-03-18 RX ORDER — PROPOFOL 10 MG/ML
INJECTION, EMULSION INTRAVENOUS
Status: DISCONTINUED | OUTPATIENT
Start: 2020-03-18 | End: 2020-03-18 | Stop reason: HOSPADM

## 2020-03-18 RX ORDER — HYDROMORPHONE HYDROCHLORIDE 1 MG/ML
1 INJECTION, SOLUTION INTRAMUSCULAR; INTRAVENOUS; SUBCUTANEOUS
Status: DISCONTINUED | OUTPATIENT
Start: 2020-03-18 | End: 2020-03-20 | Stop reason: HOSPADM

## 2020-03-18 RX ORDER — HYOSCYAMINE SULFATE 0.12 MG/1
0.12 TABLET SUBLINGUAL
Status: DISCONTINUED | OUTPATIENT
Start: 2020-03-18 | End: 2020-03-20 | Stop reason: HOSPADM

## 2020-03-18 RX ORDER — ONDANSETRON 2 MG/ML
4 INJECTION INTRAMUSCULAR; INTRAVENOUS
Status: DISCONTINUED | OUTPATIENT
Start: 2020-03-18 | End: 2020-03-20 | Stop reason: HOSPADM

## 2020-03-18 RX ORDER — ONDANSETRON 2 MG/ML
INJECTION INTRAMUSCULAR; INTRAVENOUS AS NEEDED
Status: DISCONTINUED | OUTPATIENT
Start: 2020-03-18 | End: 2020-03-18 | Stop reason: HOSPADM

## 2020-03-18 RX ORDER — DIPHENHYDRAMINE HYDROCHLORIDE 50 MG/ML
12.5 INJECTION, SOLUTION INTRAMUSCULAR; INTRAVENOUS ONCE
Status: DISCONTINUED | OUTPATIENT
Start: 2020-03-18 | End: 2020-03-18 | Stop reason: HOSPADM

## 2020-03-18 RX ORDER — DEXAMETHASONE SODIUM PHOSPHATE 4 MG/ML
INJECTION, SOLUTION INTRA-ARTICULAR; INTRALESIONAL; INTRAMUSCULAR; INTRAVENOUS; SOFT TISSUE
Status: COMPLETED | OUTPATIENT
Start: 2020-03-18 | End: 2020-03-18

## 2020-03-18 RX ORDER — SODIUM CHLORIDE 0.9 % (FLUSH) 0.9 %
5-40 SYRINGE (ML) INJECTION AS NEEDED
Status: DISCONTINUED | OUTPATIENT
Start: 2020-03-18 | End: 2020-03-20 | Stop reason: HOSPADM

## 2020-03-18 RX ORDER — CEFAZOLIN SODIUM/WATER 2 G/20 ML
2 SYRINGE (ML) INTRAVENOUS ONCE
Status: COMPLETED | OUTPATIENT
Start: 2020-03-18 | End: 2020-03-18

## 2020-03-18 RX ORDER — SODIUM CHLORIDE 0.9 % (FLUSH) 0.9 %
5-40 SYRINGE (ML) INJECTION AS NEEDED
Status: DISCONTINUED | OUTPATIENT
Start: 2020-03-18 | End: 2020-03-18 | Stop reason: HOSPADM

## 2020-03-18 RX ORDER — OXYCODONE AND ACETAMINOPHEN 5; 325 MG/1; MG/1
1 TABLET ORAL AS NEEDED
Status: DISCONTINUED | OUTPATIENT
Start: 2020-03-18 | End: 2020-03-18 | Stop reason: HOSPADM

## 2020-03-18 RX ORDER — SODIUM CHLORIDE, SODIUM LACTATE, POTASSIUM CHLORIDE, CALCIUM CHLORIDE 600; 310; 30; 20 MG/100ML; MG/100ML; MG/100ML; MG/100ML
75 INJECTION, SOLUTION INTRAVENOUS CONTINUOUS
Status: DISCONTINUED | OUTPATIENT
Start: 2020-03-18 | End: 2020-03-18 | Stop reason: HOSPADM

## 2020-03-18 RX ORDER — BUPROPION HYDROCHLORIDE 150 MG/1
150 TABLET, EXTENDED RELEASE ORAL 2 TIMES DAILY
Status: DISCONTINUED | OUTPATIENT
Start: 2020-03-18 | End: 2020-03-20 | Stop reason: HOSPADM

## 2020-03-18 RX ORDER — NALOXONE HYDROCHLORIDE 0.4 MG/ML
.2-.4 INJECTION, SOLUTION INTRAMUSCULAR; INTRAVENOUS; SUBCUTANEOUS
Status: DISCONTINUED | OUTPATIENT
Start: 2020-03-18 | End: 2020-03-20 | Stop reason: HOSPADM

## 2020-03-18 RX ORDER — ROPIVACAINE HYDROCHLORIDE 2 MG/ML
INJECTION, SOLUTION EPIDURAL; INFILTRATION; PERINEURAL AS NEEDED
Status: DISCONTINUED | OUTPATIENT
Start: 2020-03-18 | End: 2020-03-18 | Stop reason: HOSPADM

## 2020-03-18 RX ORDER — SODIUM CHLORIDE 9 MG/ML
50 INJECTION, SOLUTION INTRAVENOUS CONTINUOUS
Status: DISCONTINUED | OUTPATIENT
Start: 2020-03-18 | End: 2020-03-18 | Stop reason: HOSPADM

## 2020-03-18 RX ORDER — HYDROMORPHONE HYDROCHLORIDE 2 MG/ML
0.5 INJECTION, SOLUTION INTRAMUSCULAR; INTRAVENOUS; SUBCUTANEOUS
Status: DISCONTINUED | OUTPATIENT
Start: 2020-03-18 | End: 2020-03-18 | Stop reason: HOSPADM

## 2020-03-18 RX ORDER — OXYCODONE HYDROCHLORIDE 5 MG/1
5 TABLET ORAL
Status: DISCONTINUED | OUTPATIENT
Start: 2020-03-18 | End: 2020-03-18 | Stop reason: HOSPADM

## 2020-03-18 RX ORDER — CARISOPRODOL 350 MG/1
350 TABLET ORAL
Status: DISCONTINUED | OUTPATIENT
Start: 2020-03-18 | End: 2020-03-20 | Stop reason: HOSPADM

## 2020-03-18 RX ORDER — FENTANYL CITRATE 50 UG/ML
25 INJECTION, SOLUTION INTRAMUSCULAR; INTRAVENOUS ONCE
Status: COMPLETED | OUTPATIENT
Start: 2020-03-18 | End: 2020-03-18

## 2020-03-18 RX ORDER — CELECOXIB 200 MG/1
200 CAPSULE ORAL EVERY 12 HOURS
Status: DISCONTINUED | OUTPATIENT
Start: 2020-03-18 | End: 2020-03-19

## 2020-03-18 RX ORDER — AMOXICILLIN 250 MG
2 CAPSULE ORAL DAILY
Status: DISCONTINUED | OUTPATIENT
Start: 2020-03-19 | End: 2020-03-20 | Stop reason: HOSPADM

## 2020-03-18 RX ORDER — ASPIRIN 81 MG/1
81 TABLET ORAL EVERY 12 HOURS
Qty: 70 TAB | Refills: 0 | Status: SHIPPED | OUTPATIENT
Start: 2020-03-18 | End: 2020-04-22

## 2020-03-18 RX ORDER — ACETAMINOPHEN 10 MG/ML
1000 INJECTION, SOLUTION INTRAVENOUS ONCE
Status: COMPLETED | OUTPATIENT
Start: 2020-03-18 | End: 2020-03-19

## 2020-03-18 RX ORDER — ACETAMINOPHEN 500 MG
1000 TABLET ORAL EVERY 6 HOURS
Status: DISCONTINUED | OUTPATIENT
Start: 2020-03-19 | End: 2020-03-18

## 2020-03-18 RX ORDER — DEXAMETHASONE SODIUM PHOSPHATE 100 MG/10ML
10 INJECTION INTRAMUSCULAR; INTRAVENOUS ONCE
Status: DISCONTINUED | OUTPATIENT
Start: 2020-03-19 | End: 2020-03-19

## 2020-03-18 RX ORDER — SODIUM CHLORIDE 9 MG/ML
100 INJECTION, SOLUTION INTRAVENOUS CONTINUOUS
Status: DISPENSED | OUTPATIENT
Start: 2020-03-18 | End: 2020-03-19

## 2020-03-18 RX ORDER — HYDROMORPHONE HYDROCHLORIDE 2 MG/1
2 TABLET ORAL
Status: DISCONTINUED | OUTPATIENT
Start: 2020-03-18 | End: 2020-03-20 | Stop reason: HOSPADM

## 2020-03-18 RX ORDER — SODIUM CHLORIDE, SODIUM LACTATE, POTASSIUM CHLORIDE, CALCIUM CHLORIDE 600; 310; 30; 20 MG/100ML; MG/100ML; MG/100ML; MG/100ML
100 INJECTION, SOLUTION INTRAVENOUS CONTINUOUS
Status: DISCONTINUED | OUTPATIENT
Start: 2020-03-18 | End: 2020-03-18 | Stop reason: HOSPADM

## 2020-03-18 RX ADMIN — ONDANSETRON 4 MG: 2 INJECTION INTRAMUSCULAR; INTRAVENOUS at 17:29

## 2020-03-18 RX ADMIN — HYDROMORPHONE HYDROCHLORIDE 0.5 MG: 2 INJECTION INTRAMUSCULAR; INTRAVENOUS; SUBCUTANEOUS at 09:44

## 2020-03-18 RX ADMIN — MIDAZOLAM 2 MG: 1 INJECTION INTRAMUSCULAR; INTRAVENOUS at 07:03

## 2020-03-18 RX ADMIN — HYDROMORPHONE HYDROCHLORIDE 0.5 MG: 2 INJECTION INTRAMUSCULAR; INTRAVENOUS; SUBCUTANEOUS at 09:33

## 2020-03-18 RX ADMIN — DEXAMETHASONE SODIUM PHOSPHATE 5 MG: 4 INJECTION, SOLUTION INTRAMUSCULAR; INTRAVENOUS at 07:15

## 2020-03-18 RX ADMIN — Medication 1 AMPULE: at 20:53

## 2020-03-18 RX ADMIN — FAMOTIDINE 20 MG: 20 TABLET ORAL at 06:09

## 2020-03-18 RX ADMIN — SODIUM CHLORIDE, SODIUM LACTATE, POTASSIUM CHLORIDE, AND CALCIUM CHLORIDE: 600; 310; 30; 20 INJECTION, SOLUTION INTRAVENOUS at 08:17

## 2020-03-18 RX ADMIN — HYDROMORPHONE HYDROCHLORIDE 0.5 MG: 2 INJECTION INTRAMUSCULAR; INTRAVENOUS; SUBCUTANEOUS at 09:39

## 2020-03-18 RX ADMIN — Medication 10 MG: at 08:09

## 2020-03-18 RX ADMIN — ROPIVACAINE HYDROCHLORIDE 10 ML: 150 INJECTION, SOLUTION EPIDURAL; INFILTRATION; PERINEURAL at 07:15

## 2020-03-18 RX ADMIN — HYDROMORPHONE HYDROCHLORIDE 0.5 MG: 2 INJECTION INTRAMUSCULAR; INTRAVENOUS; SUBCUTANEOUS at 09:49

## 2020-03-18 RX ADMIN — TRANEXAMIC ACID 1 G: 100 INJECTION, SOLUTION INTRAVENOUS at 07:20

## 2020-03-18 RX ADMIN — BUPROPION HYDROCHLORIDE 150 MG: 150 TABLET, EXTENDED RELEASE ORAL at 20:53

## 2020-03-18 RX ADMIN — ONDANSETRON 4 MG: 2 INJECTION INTRAMUSCULAR; INTRAVENOUS at 08:56

## 2020-03-18 RX ADMIN — PROPOFOL 50 MCG/KG/MIN: 10 INJECTION, EMULSION INTRAVENOUS at 07:28

## 2020-03-18 RX ADMIN — ACETAMINOPHEN 1000 MG: 10 INJECTION, SOLUTION INTRAVENOUS at 16:17

## 2020-03-18 RX ADMIN — LIDOCAINE HYDROCHLORIDE 0.1 ML: 10 INJECTION, SOLUTION INFILTRATION; PERINEURAL at 06:06

## 2020-03-18 RX ADMIN — ACETAMINOPHEN 1000 MG: 10 INJECTION, SOLUTION INTRAVENOUS at 09:55

## 2020-03-18 RX ADMIN — ASPIRIN 81 MG: 81 TABLET ORAL at 20:53

## 2020-03-18 RX ADMIN — PHENYLEPHRINE HYDROCHLORIDE 100 MCG: 10 INJECTION INTRAVENOUS at 08:54

## 2020-03-18 RX ADMIN — SODIUM CHLORIDE, SODIUM LACTATE, POTASSIUM CHLORIDE, AND CALCIUM CHLORIDE 75 ML/HR: 600; 310; 30; 20 INJECTION, SOLUTION INTRAVENOUS at 06:09

## 2020-03-18 RX ADMIN — PHENYLEPHRINE HYDROCHLORIDE 100 MCG: 10 INJECTION INTRAVENOUS at 09:02

## 2020-03-18 RX ADMIN — HYDROMORPHONE HYDROCHLORIDE 2 MG: 2 TABLET ORAL at 16:31

## 2020-03-18 RX ADMIN — Medication 3 AMPULE: at 06:06

## 2020-03-18 RX ADMIN — HYDROMORPHONE HYDROCHLORIDE 0.5 MG: 2 INJECTION INTRAMUSCULAR; INTRAVENOUS; SUBCUTANEOUS at 10:19

## 2020-03-18 RX ADMIN — Medication 2 G: at 16:16

## 2020-03-18 RX ADMIN — CELECOXIB 200 MG: 200 CAPSULE ORAL at 20:53

## 2020-03-18 RX ADMIN — Medication 10 MG: at 07:58

## 2020-03-18 RX ADMIN — Medication 2 G: at 07:24

## 2020-03-18 RX ADMIN — PHENYLEPHRINE HYDROCHLORIDE 50 MCG: 10 INJECTION INTRAVENOUS at 08:27

## 2020-03-18 RX ADMIN — LISINOPRIL 10 MG: 5 TABLET ORAL at 20:53

## 2020-03-18 RX ADMIN — MIDAZOLAM 1 MG: 1 INJECTION INTRAMUSCULAR; INTRAVENOUS at 07:19

## 2020-03-18 RX ADMIN — HYDROMORPHONE HYDROCHLORIDE 2 MG: 2 TABLET ORAL at 20:53

## 2020-03-18 RX ADMIN — PROMETHAZINE HYDROCHLORIDE 3.25 MG: 25 INJECTION INTRAMUSCULAR; INTRAVENOUS at 10:01

## 2020-03-18 RX ADMIN — SODIUM CHLORIDE, SODIUM LACTATE, POTASSIUM CHLORIDE, AND CALCIUM CHLORIDE 100 ML/HR: 600; 310; 30; 20 INJECTION, SOLUTION INTRAVENOUS at 09:43

## 2020-03-18 RX ADMIN — MEPIVACAINE HYDROCHLORIDE 2.8 ML: 20 INJECTION, SOLUTION EPIDURAL; INFILTRATION at 07:23

## 2020-03-18 RX ADMIN — FENTANYL CITRATE 25 MCG: 50 INJECTION INTRAMUSCULAR; INTRAVENOUS at 07:03

## 2020-03-18 RX ADMIN — MIDAZOLAM 1 MG: 1 INJECTION INTRAMUSCULAR; INTRAVENOUS at 07:27

## 2020-03-18 NOTE — OP NOTES
[address] [place] [address] Robotic Assisted Total Knee Arthroplasty: PS component       Patient:Rosita Knight   : 1950  Medical Record [id number]  Pre-operative Diagnosis:  Primary osteoarthritis of right knee [M17.11]  Post-operative Diagnosis: Primary osteoarthritis of right knee [M17.11]  Location: [address] [place] [address]  Surgeon: Erlinda Palacios MD   Assistant: Genesis Espinoza    Anesthesia: Spinal and FNB    Indications: Patient has end stage arthritis. They have tried and failed conservative management. Procedure:Procedure(s) (LRB):  RIGHT TARAS ROBOTIC TOTAL KNEE ARTHROPLASTY CEMENTED TKA SPINAL/ ADDUCTOR CANAL BLOCK DENNYS (Right)   CPT Code: 20575  The complexity of the total joint surgery requires the use of a first assistant for positioning, retraction and expertise in closure. Tourniquet Time: 0 minutes  EBL: 250 cc  Findings: severe degenerative arthritis, patellar osteophytes, posterior femoral osteophytes   BMI: Body mass index is 28.57 kg/m². Errol Beavers was brought to the operating room and positioned on the operating table. She was anesthestized with anesthesia. A bailey catheter was placed preoperatively and IV antibiotics was administered. Prior to the incision being made a timeout was called identifying the patient, procedure ,operative side and surgeon The operative leg was prepped and draped in the usual sterile manner. An anterior longitudinal incision was accomplished just medial to the tibial tubercle and extending approximal 6 centimeters proximal to the superior pole of the patella. A medial parapatellar capsular incision was performed. The medial capsular flap was elevated around to the insertion of the semimembranous tendon. The patella was everted and the knee flexed and externally rotated. The medial and external menisci were excised. The lateral half of the fat pad excised and the patella femoral ligament was released.   The anterior cruciate ligament was resect and the posterior cruciate ligament was retained. The femoral and tibial arrays were pinned in place and registered with the [employer]. The patient landmarks were collected and the tibial and femoral checkpoints were registered and verified. The preresection balancing was performed. The distal femur was addressed first.   Utilizing the [facility] robotic arm the distal femoral cut was made. The anterior and posterior cuts were then made. The osteophytes were removed from the tibial and femoral surfaces. The notch cut was then performed    The tibia was then addressed. The [employer] robotic arm was then used to make the measured resection of the tibia. The tibia was sized. The tibial base plate was pinned into place with the appropriate external rotation and stem site prepared. A trial femoral component and poly was placed. A preliminary range of motion was accomplished with the trial components. Tthe patient was found to obtain full extension as well as appropriate flexion. The patient's ligaments were stable in flexion and extension to medial and lateral stressing and the alignment was through the appropriate mechanical axis. Additional surgical procedures included: none. The patella was then everted. The bone was resect to accommodate the three peg patellar button. A trial reduction revealed appropriate tracking through the patellofemoral groove with no lateral retinacular release being accomplished. All trial components were removed. The real implants were opened: Sizes listed below. The knee was irrigated. There were no femoral deficiencies. There were no tibial deficiencies. No augmentation was utilized. Two packages of cement were mixed and the permanent Tibial and Femoral components were cemented into place. The patella component was then  cemented in place.     [facility] knee was placed through range of motion and noted to be stable as mentioned above with the trail components. The wound was dry, therefore no drain was used. The operative knee was injected with 60 cc of Naropin, 10 cc's of morphine and 1 cc of 30 mg of Toradol. The knee was then soaked with a diluted betadine solution for approximately 3 min. This was then thoroughly irrigated. The capsular layer was closed using a #1 PDS suture. Then, 1 gram (100 mg/ml) of Transexamic Acid was injected into the joint space. The subcutaneous layers were closed using 2-0 Stratafix. Finally the skin was closed using 3-0 Vicryl and staples which were applied in occlusive fashion and sterile bandage applied. An Iceman cryo pad was applied on the operative leg. Sponge count and needle counts were correct. Vikash Owens left the operating room     Implants:   Implant Name Type Inv.  Item Serial No.  Lot No. LRB No. Used   CEMENT BNE SIMPLEX W/O GENT -- PK/10 ONLY - [id number]  CEMENT BNE SIMPLEX W/O GENT -- PK/10 ONLY [id number] [employer] HOW [id number] Right 2   COMPNT FEM POST STBL 5 R --  - [id number]  COMPNT FEM POST STBL 5 R --  [id number] [employer] HOW [id number] Right 1   BASEPLT TIB UNIV TRIATHLN 5 --  - [id number]  BASEPLT TIB UNIV TRIATHLN 5 --  [id number] [employer] HOW [id number] Right 1   PEG FIX FEM DSTL TRIATHLN --  - [id number]  PEG FIX FEM DSTL TRIATHLN --  [id number] [employer] HOW [id number] Right 1   TIBIAL BEARING INSERT- PS   [id number]  [id number] Right 1         Signed By: Tanya Keating MD   3/18/2020,  11:47 AM

## 2020-03-18 NOTE — ANESTHESIA PROCEDURE NOTES
Spinal Block    Start time: 3/18/2020 7:21 AM  End time: 3/18/2020 7:23 AM  Performed by: Sahra Argueta MD  Authorized by: Sahra Argueta MD     Pre-procedure:   Indications: at surgeon's request and primary anesthetic  Preanesthetic Checklist: patient identified, risks and benefits discussed, anesthesia consent, site marked, patient being monitored and timeout performed    Timeout Time: 07:21          Spinal Block:   Patient Position:  Seated  Prep Region:  Lumbar      Location:  L4-5  Technique:  Single shot        Needle:   Needle Type:  Pencan  Needle Gauge:  25 G  Attempts:  1      Events: CSF confirmed, no blood with aspiration and no paresthesia        Assessment:  Insertion:  Uncomplicated  Patient tolerance:  Patient tolerated the procedure well with no immediate complications

## 2020-03-18 NOTE — PROGRESS NOTES
TRANSFER - IN REPORT:    Verbal report received from Idris Ketn RN on Carmen Rangel  being received from PACU for routine post - op      Report consisted of patients Situation, Background, Assessment and   Recommendations(SBAR). Information from the following report(s) SBAR, Kardex and MAR was reviewed with the receiving nurse. Opportunity for questions and clarification was provided. Assessment completed upon patients arrival to unit and care assumed. Oriented to room, bed controls, and how to order meals. Aquacel dry and intact to R knee with iceman. SCDs and yellow gripper socks to LEs and instructed not to get up without staff to assist. Using IS. Son in room. Pt moving LEs well and has sensation.

## 2020-03-18 NOTE — PROGRESS NOTES
03/18/20 1442   Oxygen Therapy   O2 Sat (%) 97 %   Pulse via Oximetry 66 beats per minute   O2 Device Room air   O2 Flow Rate (L/min) 0 l/min   Incentive Spirometry Treatment   Actual Volume (ml) 2500 ml   Patient achieved   2500   Ml/sec on IS. Patient encouraged to do 10 breaths every hour while awake-patient agreed and demonstrated. No shortness of breath or distress noted. BS are clear b/l. [facility] Camp notes reviewed- Sat monitor placed at bedside.

## 2020-03-18 NOTE — PERIOP NOTES
TRANSFER - IN REPORT:    Verbal report received from SHAY Mattson on Randine Sayer  being received from 3rd, Ortho for ordered procedure      Report consisted of patients Situation, Background, Assessment and   Recommendations(SBAR). Information from the following report(s) SBAR, Kardex, Procedure Summary and Intake/Output was reviewed with the receiving nurse. Opportunity for questions and clarification was provided. Assessment completed upon patients arrival to unit and care assumed.

## 2020-03-18 NOTE — PERIOP NOTES
Teach back method used in review of Hibiclens usage preop/postop, TB screening, pain management goals, falls precautions and use of Nozin for prevention of staph infections. Incentive spirometer -will review postop.

## 2020-03-18 NOTE — H&P
The patient has end stage arthritis of the right knee. The patient was see and examined and there are no changes to the patient's orthopedic condition. They have tried conservative treatment for this condition; including antiinflammatories and lifestyle modifications and have failed. The necessity for the joint replacement is still present, and the H&P from the office is still current.  The patient will be admitted today for Procedure(s) (LRB):  RIGHT TARAS ROBOTIC TOTAL KNEE ARTHROPLASTY CEMENTED TKA SPINAL/ ADDUCTOR CANAL BLOCK DENNYS (Right)

## 2020-03-18 NOTE — PROGRESS NOTES
Vomited small amount. Feels better. Medicated for c/o pain with dilaudid po. Back in bed, sitting up. Son in room.

## 2020-03-18 NOTE — ANESTHESIA POSTPROCEDURE EVALUATION
Procedure(s):  RIGHT TARAS ROBOTIC TOTAL KNEE ARTHROPLASTY CEMENTED TKA SPINAL/ ADDUCTOR CANAL BLOCK DENNYS.    spinal    Anesthesia Post Evaluation      Multimodal analgesia: multimodal analgesia used between 6 hours prior to anesthesia start to PACU discharge  Patient location during evaluation: bedside  Patient participation: complete - patient participated  Level of consciousness: awake  Pain management: adequate  Airway patency: patent  Anesthetic complications: no  Cardiovascular status: acceptable and stable  Respiratory status: acceptable and nasal cannula  Hydration status: acceptable  Comments: GA elected intraop; pain treated in PACU  Post anesthesia nausea and vomiting:  none      Vitals Value Taken Time   /73 3/18/2020 10:52 AM   Temp 36.7 °C (98 °F) 3/18/2020  9:32 AM   Pulse 88 3/18/2020 10:52 AM   Resp 16 3/18/2020 10:52 AM   SpO2 98 % 3/18/2020 10:52 AM

## 2020-03-18 NOTE — PERIOP NOTES
TRANSFER - OUT REPORT:    Verbal report given to [facility] on Pablo Hancock  being transferred to [address] [place] [address] for routine post - op       Report consisted of patients Situation, Background, Assessment and   Recommendations(SBAR). Information from the following report(s) SBAR, Kardex, OR Summary, Procedure Summary and Intake/Output was reviewed with the receiving nurse. Lines:   Peripheral IV 03/18/20 Right Hand (Active)   Site Assessment Clean, dry, & intact 3/18/2020  9:56 AM   Phlebitis Assessment 0 3/18/2020  9:56 AM   Infiltration Assessment 0 3/18/2020  9:56 AM   Dressing Status Clean, dry, & intact 3/18/2020  9:56 AM   Dressing Type Transparent;Tape 3/18/2020  9:56 AM   Hub Color/Line Status Green; Infusing 3/18/2020  5:57 AM        Opportunity for questions and clarification was provided. Patient transported with:   O2 @ 2 liters  Tech    VTE prophylaxis orders have been written for Pablo Hancock. Patient and family given floor number and nurses name. Family updated re: pt status after security code verified.

## 2020-03-18 NOTE — CONSULTS
Patient is status post right Freddy robotic total knee arthroplasty. POD #0  No reported complications    Hospitalist consulted for medical management. Chart has been reviewed. Patient was not seen or examined in person. Recommendations:    Hypertension  Latest BP readings stable  Mild elevations noted most likely secondary to pain    Plan:  -Continue home meds: Lisinopril 10 mg  -Analgesia as per Ortho    GERD  Reportedly rare symptoms    Plan:  -Can initiate oral Protonix 40mg Daily if patient starts to have symptoms    Osteoarthritis    Plan:  -Continue home meds if still having pain status post knee replacement      Hospitalist service will sign off. Do not hesitate to call for any questions or concerns. *Patient not billed for this encounter.

## 2020-03-18 NOTE — ANESTHESIA PREPROCEDURE EVALUATION
Relevant Problems   No relevant active problems       Anesthetic History   No history of anesthetic complications            Review of Systems / Medical History  Patient summary reviewed and pertinent labs reviewed    Pulmonary  Within defined limits                 Neuro/Psych   Within defined limits           Cardiovascular    Hypertension: well controlled              Exercise tolerance: >4 METS     GI/Hepatic/Renal     GERD (rare): well controlled           Endo/Other        Arthritis     Other Findings              Physical Exam    Airway  Mallampati: II  TM Distance: 4 - 6 cm  Neck ROM: normal range of motion   Mouth opening: Normal     Cardiovascular  Regular rate and rhythm,  S1 and S2 normal,  no murmur, click, rub, or gallop  Rhythm: regular  Rate: normal         Dental    Dentition: Full upper dentures     Pulmonary  Breath sounds clear to auscultation               Abdominal  Abdominal exam normal       Other Findings            Anesthetic Plan    ASA: 2  Anesthesia type: spinal      Post-op pain plan if not by surgeon: peripheral nerve block single      Anesthetic plan and risks discussed with: Patient

## 2020-03-18 NOTE — PERIOP NOTES
TRANSFER - OUT REPORT:    Verbal report given to [facility] on Carmen Rangel  being transferred to preop block for routine progression of care       Report consisted of patients Situation, Background, Assessment and   Recommendations(SBAR). Information from the following report(s) SBAR, MAR and Recent Results was reviewed with the receiving nurse. Lines:   Peripheral IV 03/18/20 Right Hand (Active)   Site Assessment Clean, dry, & intact 3/18/2020  5:57 AM   Phlebitis Assessment 0 3/18/2020  5:57 AM   Infiltration Assessment 0 3/18/2020  5:57 AM   Dressing Status Clean, dry, & intact 3/18/2020  5:57 AM   Dressing Type Tape;Transparent 3/18/2020  5:57 AM   Hub Color/Line Status Green; Infusing 3/18/2020  5:57 AM        Opportunity for questions and clarification was provided. Patient transported with:   Tech     Need updated consent order- [address] notified.

## 2020-03-18 NOTE — PROGRESS NOTES
Problem: Self Care Deficits Care Plan (Adult)  Goal: *Acute Goals and Plan of Care (Insert Text)  Description: GOALS:   DISCHARGE GOALS (in preparation for going home/rehab):  3 days  1. Ms. Lois Moseley will perform one lower body dressing activity with minimal assistance required to demonstrate improved functional mobility and safety. 2.  Ms. Lois Moseley will perform one lower body bathing activity with minimal assistance required to demonstrate improved functional mobility and safety. 3.  Ms. Lois Moseley will perform toileting/toilet transfer with contact guard assistance to demonstrate improved functional mobility and safety. 4.  Ms. Lois Moseley will perform shower transfer with contact guard assistance to demonstrate improved functional mobility and safety. JOINT CAMP OCCUPATIONAL THERAPY TKA: Initial Assessment, Daily Note, and AM 3/18/2020  INPATIENT: Hospital Day: 1  [place] / [place] / Product Type: Medicare /      NAME/AGE/GENDER: Mariam Ball is a 77 y.o. female   PRIMARY DIAGNOSIS:  Primary osteoarthritis of right knee [M17.11]   Procedure(s) and Anesthesia Type:     * RIGHT TRAAS ROBOTIC TOTAL KNEE ARTHROPLASTY CEMENTED TKA SPINAL/ ADDUCTOR CANAL BLOCK DENNYS - Spinal (Right)  ICD-10: Treatment Diagnosis:    Pain in Right Knee (M25.561)  Stiffness of Right Knee, Not elsewhere classified (L59.606)      ASSESSMENT:     Ms. Lois Moseley is s/p R TKA and presents with decreased weight bearing on R LE and decreased independence with functional mobility and activities of daily living as compared to baseline level of function and safety. Patient would benefit from skilled Occupational Therapy to maximize independence and safety with self-care task and functional mobility. Pt would also benefit from education on adaptive equipment and safety precautions in preparation for going home.     Pt completed toileting, hygiene and dressing this am.     This section established at most recent assessment PROBLEM LIST (Impairments causing functional limitations):  Decreased Strength  Decreased ADL/Functional Activities  Decreased Transfer Abilities  Increased Pain  Increased Fatigue  Decreased Flexibility/Joint Mobility  Decreased Knowledge of Precautions   INTERVENTIONS PLANNED: (Benefits and precautions of occupational therapy have been discussed with the patient.)  Activities of daily living training  Adaptive equipment training  Balance training  Clothing management  Donning&doffing training  Theraputic activity     TREATMENT PLAN: Frequency/Duration: Follow patient qd to address above goals. Rehabilitation Potential For Stated Goals: Good     RECOMMENDED REHABILITATION/EQUIPMENT: (at time of discharge pending progress): Continue Skilled Therapy and Home Health: Physical Therapy. OCCUPATIONAL PROFILE AND HISTORY:   History of Present Injury/Illness (Reason for Referral): Pt presents this date s/p (R) TKA. Past Medical History/Comorbidities:   Ms. Emma Martinez  has a past medical history of Adverse effect of anesthesia, Arthritis, Claustrophobia, Former cigarette smoker, GERD (gastroesophageal reflux disease), History of melanoma, Hypertension, and Ureteral stone. Ms. Emma Martinez  has a past surgical history that includes hx cataract removal (Bilateral, 12/2018,01/2019); hx tubal ligation; hx other surgical; and hx lithotripsy (03/05/2020).   Social History/Living Environment:   Home Environment: Private residence  # Steps to Enter: 14  One/Two Story Residence: One story  Living Alone: Yes  Support Systems: Child(paddy)  Patient Expects to be Discharged to[de-identified] Private residence  Current DME Used/Available at Home: Ronald Butler, [address] [employer] [address] chair  Prior Level of Function/Work/Activity:  independent     Number of Personal Factors/Comorbidities that affect the Plan of Care: Brief history (0):  LOW COMPLEXITY   ASSESSMENT OF OCCUPATIONAL PERFORMANCE[de-identified]   Most Recent Physical Functioning:   Balance  Sitting: Intact  Standing: With support;Pull to stand       Gross Assessment: Yes  Gross Assessment  AROM: Within functional limits(BUE)  Strength: Within functional limits(BUE)  Coordination: Within functional limits(BUE)  Tone: Normal  Sensation: Intact                 Vision  Acuity: Within Defined Limits    Mental Status  Neurologic State: Alert  Orientation Level: Oriented X4  Cognition: Follows commands  Perception: Appears intact  Perseveration: No perseveration noted  Safety/Judgement: Fall prevention                Basic ADLs (From Assessment) Complex ADLs (From Assessment)   Basic ADL  Feeding: Setup  Oral Facial Hygiene/Grooming: Setup  Bathing: Minimum assistance  Upper Body Dressing: Setup  Lower Body Dressing: Moderate assistance  Toileting: Setup     Grooming/Bathing/Dressing Activities of Daily Living     Cognitive Retraining  Safety/Judgement: Fall prevention                 Functional Transfers  Bathroom Mobility: Minimum assistance  Toilet Transfer : Minimum assistance  Shower Transfer: Minimum assistance     Bed/Mat Mobility  Supine to Sit: Contact guard assistance  Sit to Stand: Minimum assistance  Stand to Sit: Minimum assistance  Bed to Chair: Minimum assistance         Physical Skills Involved:  Balance  Strength  Activity Tolerance Cognitive Skills Affected (resulting in the inability to perform in a timely and safe manner):  none  Psychosocial Skills Affected:  none    Number of elements that affect the Plan of Care: 1-3:  LOW COMPLEXITY   CLINICAL DECISION MAKIN [facility] [address] [id number] AM-PAC 6 Clicks   Daily Activity Inpatient Short Form  How much help from another person does the patient currently need. .. Total A Lot A Little None   1. Putting on and taking off regular lower body clothing? [] 1   [x] 2   [] 3   [] 4   2. Bathing (including washing, rinsing, drying)? [] 1   [x] 2   [] 3   [] 4   3. Toileting, which includes using toilet, bedpan or urinal?   [] 1   [] 2   [x] 3   [] 4   4. Putting on and taking off regular upper body clothing? [] 1   [] 2   [] 3   [x] 4   5. Taking care of personal grooming such as brushing teeth? [] 1   [] 2   [] 3   [x] 4   6. Eating meals? [] 1   [] 2   [] 3   [x] 4   © 2007, Trustees of [address] [place] [address] [address] [id number], under license to [employer] All rights reserved     Score:  Initial: 19 Most Recent: X (Date: -- )    Interpretation of Tool:  Represents activities that are increasingly more difficult (i.e. Bed mobility, Transfers, Gait). Medical Necessity:     Patient is expected to demonstrate progress in   strength, balance, coordination, and functional technique   to   increase independence with self care and functional mobility   . Reason for Services/Other Comments:  Patient continues to require skilled intervention due to   Decrease self care and functional mobility   . Use of outcome tool(s) and clinical judgement create a POC that gives a: LOW COMPLEXITY            TREATMENT:   (In addition to Assessment/Re-Assessment sessions the following treatments were rendered)     Pre-treatment Symptoms/Complaints:  tolerated sitting up in recliner  Pain: Initial:   Pain Intensity 1: 0  Post Session:  0      Self Care: (10): Procedure(s) (per grid) utilized to improve and/or restore self-care/home management as related to dressing, toileting, and grooming. Required minimal verbal and   cueing to facilitate activities of daily living skills and compensatory activities. Assessment/Reassessment (5)    Treatment/Session Assessment:     Response to Treatment:  tolerated well.     Education:  [] Home Exercises  [x] Fall Precautions  [] Hip Precautions [] Going Home Video  [x] Knee/Hip Prosthesis Review  [x] Walker Management/Safety [x] Adaptive Equipment as Needed       Interdisciplinary Collaboration:   Physical Therapist  Occupational Therapist  Registered Nurse    After treatment position/precautions:   Up in chair  Bed/Chair-wheels locked  Call light within reach  RN notified  LEs reclined     Compliance with Program/Exercises: Compliant all of the time. Recommendations/Intent for next treatment session:  Treatment next visit will focus on increasing Ms. Yazan's independence with bed mobility, transfers, self care, functional mobility, modalities for pain, and patient education.       Total Treatment Duration:  OT Patient Time In/Time Out  Time In: 1120  Time Out: [facility]

## 2020-03-19 LAB
ANION GAP SERPL CALC-SCNC: 6 MMOL/L (ref 7–16)
BUN SERPL-MCNC: 11 MG/DL (ref 8–23)
CALCIUM SERPL-MCNC: 8.7 MG/DL (ref 8.3–10.4)
CHLORIDE SERPL-SCNC: 107 MMOL/L (ref 98–107)
CO2 SERPL-SCNC: 26 MMOL/L (ref 21–32)
CREAT SERPL-MCNC: 0.62 MG/DL (ref 0.6–1)
GLUCOSE SERPL-MCNC: 93 MG/DL (ref 65–100)
HGB BLD-MCNC: 11.4 G/DL (ref 11.7–15.4)
POTASSIUM SERPL-SCNC: 3.6 MMOL/L (ref 3.5–5.1)
SODIUM SERPL-SCNC: 139 MMOL/L (ref 136–145)

## 2020-03-19 PROCEDURE — 74011250637 HC RX REV CODE- 250/637: Performed by: PHYSICIAN ASSISTANT

## 2020-03-19 PROCEDURE — 97110 THERAPEUTIC EXERCISES: CPT

## 2020-03-19 PROCEDURE — 74011250637 HC RX REV CODE- 250/637: Performed by: NURSE PRACTITIONER

## 2020-03-19 PROCEDURE — 36415 COLL VENOUS BLD VENIPUNCTURE: CPT

## 2020-03-19 PROCEDURE — 74011250636 HC RX REV CODE- 250/636: Performed by: NURSE PRACTITIONER

## 2020-03-19 PROCEDURE — 74011250636 HC RX REV CODE- 250/636: Performed by: PHYSICIAN ASSISTANT

## 2020-03-19 PROCEDURE — 85018 HEMOGLOBIN: CPT

## 2020-03-19 PROCEDURE — 74011250637 HC RX REV CODE- 250/637: Performed by: ORTHOPAEDIC SURGERY

## 2020-03-19 PROCEDURE — 65270000029 HC RM PRIVATE

## 2020-03-19 PROCEDURE — 97116 GAIT TRAINING THERAPY: CPT

## 2020-03-19 PROCEDURE — 80048 BASIC METABOLIC PNL TOTAL CA: CPT

## 2020-03-19 PROCEDURE — 97535 SELF CARE MNGMENT TRAINING: CPT

## 2020-03-19 PROCEDURE — 94760 N-INVAS EAR/PLS OXIMETRY 1: CPT

## 2020-03-19 RX ORDER — CELECOXIB 200 MG/1
200 CAPSULE ORAL EVERY 12 HOURS
Status: DISCONTINUED | OUTPATIENT
Start: 2020-03-20 | End: 2020-03-20 | Stop reason: HOSPADM

## 2020-03-19 RX ORDER — CALCIUM CARBONATE 200(500)MG
200 TABLET,CHEWABLE ORAL
Status: DISCONTINUED | OUTPATIENT
Start: 2020-03-19 | End: 2020-03-20 | Stop reason: HOSPADM

## 2020-03-19 RX ORDER — KETOROLAC TROMETHAMINE 15 MG/ML
15 INJECTION, SOLUTION INTRAMUSCULAR; INTRAVENOUS EVERY 8 HOURS
Status: DISPENSED | OUTPATIENT
Start: 2020-03-19 | End: 2020-03-20

## 2020-03-19 RX ORDER — DEXAMETHASONE SODIUM PHOSPHATE 100 MG/10ML
10 INJECTION INTRAMUSCULAR; INTRAVENOUS ONCE
Status: COMPLETED | OUTPATIENT
Start: 2020-03-19 | End: 2020-03-19

## 2020-03-19 RX ADMIN — HYDROMORPHONE HYDROCHLORIDE 2 MG: 2 TABLET ORAL at 08:13

## 2020-03-19 RX ADMIN — ASPIRIN 81 MG: 81 TABLET ORAL at 20:45

## 2020-03-19 RX ADMIN — HYDROMORPHONE HYDROCHLORIDE 2 MG: 2 TABLET ORAL at 01:24

## 2020-03-19 RX ADMIN — ACETAMINOPHEN 1000 MG: 500 TABLET, FILM COATED ORAL at 20:45

## 2020-03-19 RX ADMIN — Medication 2 G: at 01:24

## 2020-03-19 RX ADMIN — ANTACID TABLETS 200 MG: 500 TABLET, CHEWABLE ORAL at 17:40

## 2020-03-19 RX ADMIN — DEXAMETHASONE SODIUM PHOSPHATE 10 MG: 10 INJECTION INTRAMUSCULAR; INTRAVENOUS at 08:12

## 2020-03-19 RX ADMIN — LISINOPRIL 10 MG: 5 TABLET ORAL at 20:45

## 2020-03-19 RX ADMIN — Medication 1 AMPULE: at 08:13

## 2020-03-19 RX ADMIN — BUPROPION HYDROCHLORIDE 150 MG: 150 TABLET, EXTENDED RELEASE ORAL at 08:13

## 2020-03-19 RX ADMIN — ANTACID TABLETS 200 MG: 500 TABLET, CHEWABLE ORAL at 13:14

## 2020-03-19 RX ADMIN — KETOROLAC TROMETHAMINE 15 MG: 15 INJECTION, SOLUTION INTRAMUSCULAR; INTRAVENOUS at 08:12

## 2020-03-19 RX ADMIN — SENNOSIDES AND DOCUSATE SODIUM 2 TABLET: 8.6; 5 TABLET ORAL at 08:13

## 2020-03-19 RX ADMIN — HYDROMORPHONE HYDROCHLORIDE 1 MG: 1 INJECTION, SOLUTION INTRAMUSCULAR; INTRAVENOUS; SUBCUTANEOUS at 05:16

## 2020-03-19 RX ADMIN — HYDROMORPHONE HYDROCHLORIDE 2 MG: 2 TABLET ORAL at 17:31

## 2020-03-19 RX ADMIN — BUPROPION HYDROCHLORIDE 150 MG: 150 TABLET, EXTENDED RELEASE ORAL at 20:45

## 2020-03-19 RX ADMIN — ASPIRIN 81 MG: 81 TABLET ORAL at 08:13

## 2020-03-19 RX ADMIN — HYDROMORPHONE HYDROCHLORIDE 2 MG: 2 TABLET ORAL at 21:30

## 2020-03-19 RX ADMIN — Medication 1 AMPULE: at 20:45

## 2020-03-19 RX ADMIN — HYDROMORPHONE HYDROCHLORIDE 2 MG: 2 TABLET ORAL at 13:01

## 2020-03-19 RX ADMIN — ANTACID TABLETS 200 MG: 500 TABLET, CHEWABLE ORAL at 01:52

## 2020-03-19 NOTE — PROGRESS NOTES
03/18/20 2023   Oxygen Therapy   O2 Sat (%) 97 %   Pulse via Oximetry 65 beats per minute   O2 Device Room air   O2 Flow Rate (L/min) 0 l/min   Incentive Spirometry Treatment   Actual Volume (ml) 3200 ml   Patient achieved  3000               Ml/sec on IS. Patient encouraged to do every hour while awake-patient agreed and demonstrated. No shortness of breath or distress noted. BS are clear b/l.    [facility] notes reviewed- continuous sat # ordered HS

## 2020-03-19 NOTE — PROGRESS NOTES
Resting comfortably,dsng D/I. NV status WDL. Denies needs at present. SCDs on bilaterally. Iceman cooling system in use. Assisted to bathroom,gait steady using walker. Voids QS. Call light within reach.

## 2020-03-19 NOTE — PROGRESS NOTES
2020         Post Op day: 1 Day Post-OpProcedure(s) (LRB):  RIGHT TARAS ROBOTIC TOTAL KNEE ARTHROPLASTY CEMENTED TKA SPINAL/ ADDUCTOR CANAL BLOCK DENNYS (Right)      Admit Date: 3/18/2020  Admit Diagnosis: Primary osteoarthritis of right knee [M17.11]  Osteoarthritis [M19.90]    LAB:    Recent Results (from the past 24 hour(s))   HEMOGLOBIN    Collection Time: 20  7:02 PM   Result Value Ref Range    HGB 12.2 11.7 - 15.4 g/dL   HEMOGLOBIN    Collection Time: 20  4:16 AM   Result Value Ref Range    HGB 11.4 (L) 11.7 - 70.7 g/dL   METABOLIC PANEL, BASIC    Collection Time: 20  4:16 AM   Result Value Ref Range    Sodium 139 136 - 145 mmol/L    Potassium 3.6 3.5 - 5.1 mmol/L    Chloride 107 98 - 107 mmol/L    CO2 26 21 - 32 mmol/L    Anion gap 6 (L) 7 - 16 mmol/L    Glucose 93 65 - 100 mg/dL    BUN 11 8 - 23 MG/DL    Creatinine 0.62 0.6 - 1.0 MG/DL    GFR est AA >60 >60 ml/min/1.73m2    GFR est non-AA >60 >60 ml/min/1.73m2    Calcium 8.7 8.3 - 10.4 MG/DL     Vital Signs:    Patient Vitals for the past 8 hrs:   BP Temp Pulse Resp SpO2   20 0124 133/78 98.3 °F (36.8 °C) 86 17 94 %     Temp (24hrs), Av.9 °F (36.6 °C), Min:97.4 °F (36.3 °C), Max:98.3 °F (36.8 °C)    Body mass index is 28.57 kg/m².   Pain Control:   Pain Assessment  Pain Scale 1: Numeric (0 - 10)  Pain Intensity 1: 4  Pain Onset 1: (yrs ago)  Pain Location 1: Knee  Pain Orientation 1: Right  Pain Description 1: Aching  Pain Intervention(s) 1: Medication (see MAR)    Subjective: Doing well, No complaints, No SOB, No Chest Pain, No Nausea or Vomiting     Objective: Vital Signs are Stable, No Acute Distress, Alert and Oriented, Dressing is Dry,  Neurovascular exam is normal.       PT/OT:            Assistive Device: Walker (comment)  RLE AROM  R Knee Flexion: 60  R Knee Extension: 20             Weight Bearing Status: WBAT    Meds:  [unfilled]  [unfilled]  [unfilled]    Assessment:   Patient Active Problem List   Diagnosis Code    Osteoarthritis M19.90    Status post total right knee replacement Z96.651             Plan: Continue Physical Therapy, Monitor labs, D/C Home Today or Tomorrow        Signed By: Nicole West NP

## 2020-03-19 NOTE — PROGRESS NOTES
03/18/20 2023   Oxygen Therapy   O2 Sat (%) 97 %   Pulse via Oximetry 65 beats per minute   O2 Device Room air   O2 Flow Rate (L/min) 0 l/min

## 2020-03-19 NOTE — PROGRESS NOTES
Problem: Mobility Impaired (Adult and Pediatric)  Goal: *Acute Goals and Plan of Care (Insert Text)  Note: GOALS (1-4 days):  (1.)Ms. Carol Duron will move from supine to sit and sit to supine  in bed with SUPERVISION. (2.)Ms. Carol Duron will transfer from bed to chair and chair to bed with SUPERVISION using the least restrictive device. (3.)Ms. Carol Duron will ambulate with SUPERVISION for 125 feet with the least restrictive device. (4.)Ms. Carol Duron will ambulate up/down 4 steps with bilateral  railing with CONTACT GUARD ASSIST with no device. (5.)Ms. Carol Duron will increase right knee ROM to 5°-80°.  ________________________________________________________________________________________________      PHYSICAL THERAPY JOINT CAMP TKA: Initial Assessment and AM 3/19/2020  INPATIENT: Hospital Day: 2  Payor: [employer] / Plan: BLAKE Santiago. / Product Type: Commerical /      NAME/AGE/GENDER: Darren Johnson is a 77 y.o. female   PRIMARY DIAGNOSIS:  Primary osteoarthritis of right knee [M17.11]   Procedure(s) and Anesthesia Type:     * RIGHT TARAS ROBOTIC TOTAL KNEE ARTHROPLASTY CEMENTED TKA SPINAL/ ADDUCTOR CANAL BLOCK DENNYS - Spinal (Right)  ICD-10: Treatment Diagnosis:    · Pain in Right Knee (M25.561)  · Stiffness of Right Knee, Not elsewhere classified (M25.661)  · Other abnormalities of gait and mobility (R26.89)      ASSESSMENT:     Ms. Carol Duron presents S/P R TKA and is demonstrating a decline in her premorbid level of function. She would benefit from further PT while here to address these deficits: decreased R LE strength and ROM, standing balance, functional mobility and TKA awareness. She plans on going home at [place] with the support of her son. This am she needs to use the restroom. Assisted her to the [facility], she then amb in room and then stays up in recliner. She completes R LE exs.  Wants to possibly go home tomorrow am  This section established at most recent assessment   PROBLEM LIST (Impairments causing functional limitations):  1. Decreased Strength  2. Decreased ADL/Functional Activities  3. Decreased Transfer Abilities  4. Decreased Ambulation Ability/Technique  5. Decreased Balance  6. Increased Pain  7. Decreased Activity Tolerance  8. Increased Fatigue  9. Decreased Flexibility/Joint Mobility  10. Decreased Knowledge of Precautions  11. Decreased [place] with Home Exercise Program   INTERVENTIONS PLANNED: (Benefits and precautions of physical therapy have been discussed with the patient.)  1. Balance Exercise  2. Bed Mobility  3. Cold  4. Family Education  5. Gait Training  6. Home Exercise Program (HEP)  7. Therapeutic Activites  8. Therapeutic Exercise/Strengthening  9. Transfer Training  10. TKA education  11. Range of Motion: active/assisted/passive  12. Therapeutic Activities     TREATMENT PLAN: Frequency/Duration: Follow patient BID for duration of hospital stay to address above goals. Rehabilitation Potential For Stated Goals: Good     RECOMMENDED REHABILITATION/EQUIPMENT: (at time of discharge pending progress): Continue Skilled Therapy and Home Health: Physical Therapy. HISTORY:   History of Present Injury/Illness (Reason for Referral):  S/p R TKA  Past Medical History/Comorbidities:   Ms. George Granados  has a past medical history of Adverse effect of anesthesia, Arthritis, Claustrophobia, Former cigarette smoker, GERD (gastroesophageal reflux disease), History of melanoma, Hypertension, and Ureteral stone. Ms. George Granados  has a past surgical history that includes hx cataract removal (Bilateral, 12/2018,01/2019); hx tubal ligation; hx other surgical; and hx lithotripsy (03/05/2020).   Social History/Living Environment:   Home Environment: Private residence  # Steps to Enter: 14  Hand Rails : Right  One/Two Story Residence: One story  Living Alone: Yes  Support Systems: Child(paddy)  Patient Expects to be Discharged to[de-identified] Private residence  Current DME Used/Available at Home: [employer] rolling, Shower chair  Tub or Shower Type: Tub/Shower combination  Prior Level of Function/Work/Activity:  functionally I with ADls and amb   Number of Personal Factors/Comorbidities that affect the Plan of Care: 1-2: MODERATE COMPLEXITY   EXAMINATION:   Most Recent Physical Functioning:    L LE ROM WFL       Strength 3+/5       Sensation slightly diminished from spinal    R LE ROM knee 20-60       Strength hip flex, knee ext and ankle DF 2+      Sensation slightly diminished from spinal    BALANCE: Sitting good      Standing fair with support of RW    TRANSFERS; sit to stand min A     Stand to sit CGA       SPT with min A and RW      Toilet min A with RW    BED mOBILITY; Supine to sit with min A                                        AMB  30' with RW and close CGA       Antalgic, step to gt pattern     Decreased step length on L      Decreased stance on R         Weight Bearing Status  Right Side Weight Bearing: As tolerated           Braces/Orthotics:           Body Structures Involved:  1. Bones  2. Joints  3. Muscles Body Functions Affected:  1. Neuromusculoskeletal  2. Movement Related Activities and Participation Affected:  1. General Tasks and Demands  2. Mobility  3. Self Care   Number of elements that affect the Plan of Care: 4+: HIGH COMPLEXITY   CLINICAL PRESENTATION:   Presentation: Stable and uncomplicated: LOW COMPLEXITY   CLINICAL DECISION MAKIN [facility] [address] AM-PAC 6 Clicks   Basic Mobility Inpatient Short Form  How much difficulty does the patient currently have. .. Unable A Lot A Little None   1. Turning over in bed (including adjusting bedclothes, sheets and blankets)? [] 1   [] 2   [x] 3   [] 4   2. Sitting down on and standing up from a chair with arms ( e.g., wheelchair, bedside commode, etc.)   [] 1   [] 2   [x] 3   [] 4   3. Moving from lying on back to sitting on the side of the bed?    [] 1   [] 2   [x] 3   [] 4   How much help from another person does the patient currently need... Total A Lot A Little None   4. Moving to and from a bed to a chair (including a wheelchair)? [] 1   [] 2   [x] 3   [] 4   5. Need to walk in hospital room? [] 1   [] 2   [x] 3   [] 4   6. Climbing 3-5 steps with a railing? [] 1   [x] 2   [] 3   [] 4   © 2007, Trustees of [address] [place] [address] [address] [address], under license to [employer]. All rights reserved     Score:  Initial: 17 Most Recent: X (Date: -- )    Interpretation of Tool:  Represents activities that are increasingly more difficult (i.e. Bed mobility, Transfers, Gait). Medical Necessity:     · Patient demonstrates good rehab potential due to higher previous functional level. Reason for Services/Other Comments:  · Patient continues to require present interventions due to patient's inability to perform functional mobility at a safe supervision level. Use of outcome tool(s) and clinical judgement create a POC that gives a: Clear prediction of patient's progress: LOW COMPLEXITY            TREATMENT:   (In addition to Assessment/Re-Assessment sessions the following treatments were rendered)     Pre-treatment Symptoms/Complaints:  Needs to use the restroom. Motivated to get better quickly  Pain Initial:   Pain Intensity 1: 1  Pain Location 1: Knee  Pain Orientation 1: Right  Pain Intervention(s) 1: Cold pack, Ambulation/Increased Activity, Elevation, Exercise  Post Session:  1, applied cryocuff and elevated LEs     Therapeutic Exercise: (  10):  Exercises per grid below to improve mobility, strength and coordination. Required minimal visual, verbal and tactile cues to promote proper body alignment and promote proper body breathing techniques. Progressed range, repetitions and complexity of movement as indicated.       Assessment:10 min   Date:  3/18/20 Date:   Date:     ACTIVITY/EXERCISE AM PM AM PM AM PM   GROUP THERAPY  []  []  []  []  []  []   Ankle Pumps 10        Quad Sets 10        Gluteal Sets 10        Hip ABd/ADduction 10        Straight Leg Raises 10AA        Knee Slides 10AA        Short Arc Quads 10        Long Arc Quads         Chair Slides                  B = bilateral; AA = active assistive; A = active; P = passive      Treatment/Session Assessment:     Response to Treatment:  Tolerated well. A little numb but functional. Son present. Education:  [x] Home Exercises  [x] Fall Precautions  []  [] D/C Instruction Review  [] Knee Prosthesis Review  [x] Walker Management/Safety [] Adaptive Equipment as Needed       Interdisciplinary Collaboration:   o Physical Therapist  o Occupational Therapist  o Registered Nurse    After treatment position/precautions:   o Up in chair  o Bed/Chair-wheels locked  o Call light within reach  o RN notified  o Family at bedside    Compliance with Program/Exercises: Will assess as treatment progresses. Recommendations/Intent for next treatment session:  Treatment next visit will focus on increasing Ms. Hand's independence with bed mobility, transfers, gait training, strength/ROM exercises, modalities for pain, and patient education.       Total Treatment Duration:  PT Patient Time In/Time Out  Time In: 1135  Time Out: [address] [place], [place]

## 2020-03-19 NOTE — PROGRESS NOTES
Problem: Self Care Deficits Care Plan (Adult)  Goal: *Acute Goals and Plan of Care (Insert Text)  Description: GOALS:   DISCHARGE GOALS (in preparation for going home/rehab):  3 days  1. Ms. Tamie Sauceda will perform one lower body dressing activity with minimal assistance required to demonstrate improved functional mobility and safety. 2.  Ms. Tamie Sauceda will perform one lower body bathing activity with minimal assistance required to demonstrate improved functional mobility and safety. 3.  Ms. Tamie Sauceda will perform toileting/toilet transfer with contact guard assistance to demonstrate improved functional mobility and safety. 4.  Ms. Tamie Sauceda will perform shower transfer with contact guard assistance to demonstrate improved functional mobility and safety. JOINT CAMP OCCUPATIONAL THERAPY TKA: Daily Note, Discharge and AM 3/19/2020  INPATIENT: Hospital Day: 2  Payor: [employer]ANN[employer] Andree De [employer] / Plan: [place] [employer] Sherman De La Cruz. / Product Type: Commerical /      NAME/AGE/GENDER: Samantha Lewis is a 77 y.o. female   PRIMARY DIAGNOSIS:  Primary osteoarthritis of right knee [M17.11]   Procedure(s) and Anesthesia Type:     * RIGHT TARAS ROBOTIC TOTAL KNEE ARTHROPLASTY CEMENTED TKA SPINAL/ ADDUCTOR CANAL BLOCK DENNYS - Spinal (Right)  ICD-10: Treatment Diagnosis:    · Pain in Right Knee (M25.561)  · Stiffness of Right Knee, Not elsewhere classified (G42.893)      ASSESSMENT:     Ms. Tamie Sauceda is s/p R TKA and presents with decreased weight bearing on R LE and decreased independence with functional mobility and activities of daily living. Patient completed shower and dressing as charter below in ADL grid and is ambulating with rolling walker and contact guard assist.  Patient has met 4/4 goals and plans to return home with good family support. Family able to provide patient with appropriate level of assistance at this time.   OT reviewed safety precautions throughout session and therapy schedule for the remainder of today. Patient instructed to call for assistance when needing to get up from recliner and all needs in reach. Patient verbalized understanding of call light. This section established at most recent assessment   PROBLEM LIST (Impairments causing functional limitations):  1. Decreased Strength  2. Decreased ADL/Functional Activities  3. Decreased Transfer Abilities  4. Increased Pain  5. Increased Fatigue  6. Decreased Flexibility/Joint Mobility  7. Decreased Knowledge of Precautions   INTERVENTIONS PLANNED: (Benefits and precautions of occupational therapy have been discussed with the patient.)  1. Activities of daily living training  2. Adaptive equipment training  3. Balance training  4. Clothing management  5. Donning&doffing training  6. Theraputic activity     TREATMENT PLAN: Frequency/Duration: Follow patient qd to address above goals. Rehabilitation Potential For Stated Goals: Good     RECOMMENDED REHABILITATION/EQUIPMENT: (at time of discharge pending progress): Continue Skilled Therapy and Home Health: Physical Therapy. OCCUPATIONAL PROFILE AND HISTORY:   History of Present Injury/Illness (Reason for Referral): Pt presents this date s/p (R) TKA. Past Medical History/Comorbidities:   Ms. Peter Closs  has a past medical history of Adverse effect of anesthesia, Arthritis, Claustrophobia, Former cigarette smoker, GERD (gastroesophageal reflux disease), History of melanoma, Hypertension, and Ureteral stone. Ms. Peter Closs  has a past surgical history that includes hx cataract removal (Bilateral, 12/2018,01/2019); hx tubal ligation; hx other surgical; and hx lithotripsy (03/05/2020).   Social History/Living Environment:   Home Environment: Private residence  # Steps to Enter: 14  Hand Rails : Right  One/Two Story Residence: One story  Living Alone: Yes  Support Systems: Child(paddy)  Patient Expects to be Discharged to[de-identified] Private residence  Current DME Used/Available at Home: Ronald Huddleston, [address] [facility] chair  Tub or Shower Type: Tub/Shower combination  Prior Level of Function/Work/Activity:  independent     Number of Personal Factors/Comorbidities that affect the Plan of Care: Brief history (0):  LOW COMPLEXITY   ASSESSMENT OF OCCUPATIONAL PERFORMANCE[de-identified]   Most Recent Physical Functioning:   Balance  Sitting: Intact  Standing: With support                              Mental Status  Neurologic State: Alert  Orientation Level: Oriented X4  Cognition: Follows commands  Perception: Appears intact  Perseveration: No perseveration noted  Safety/Judgement: Fall prevention                Basic ADLs (From Assessment) Complex ADLs (From Assessment)   Basic ADL  Feeding: Setup  Oral Facial Hygiene/Grooming: Setup  Bathing: Minimum assistance  Type of Bath: Chlorhexidine (CHG), Full, Shower  Upper Body Dressing: Setup  Lower Body Dressing: Moderate assistance  Toileting: Setup     Grooming/Bathing/Dressing Activities of Daily Living   Grooming  Grooming Assistance: Set-up  Position Performed: Seated in chair  Brushing/Combing Hair: Set-up Cognitive Retraining  Safety/Judgement: Fall prevention   Upper Body Bathing  Bathing Assistance: Set-up  Position Performed: Seated in chair  Cues: Verbal cues provided  Adaptive Equipment: Grab bar; Shower chair Feeding  Feeding Assistance: Set-up   Lower Body Bathing  Bathing Assistance: Set-up  Perineal  : Set-up  Position Performed: Seated in chair  Cues: Verbal cues provided  Adaptive Equipment: Grab bar  Lower Body : Set-up  Position Performed: Seated in chair  Cues: Verbal cues provided  Adaptive Equipment: Grab bar; Shower chair Toileting  Toileting Assistance: Set-up   Upper Body Dressing Assistance  Dressing Assistance: Set-up  Pullover Shirt: Set-up(gown) Functional Transfers  Bathroom Mobility: Contact guard assistance  Toilet Transfer : Contact guard assistance  Shower Transfer: Contact guard assistance  Adaptive Equipment: Grab bars; Bedside commode; Shower chair without back;Walker (comment)   Lower Body Dressing Assistance  Dressing Assistance: Minimum assistance  Underpants: Minimum assistance  Socks: Compensatory technique training( socks)  Adaptive Equipment Used: Grab bar;Walker Bed/Mat Mobility  Supine to Sit: Contact guard assistance  Sit to Supine: (left up in chair)  Sit to Stand: Contact guard assistance  Stand to Sit: Contact guard assistance  Bed to Chair: Contact guard assistance         Physical Skills Involved:  1. Balance  2. Strength  3. Activity Tolerance Cognitive Skills Affected (resulting in the inability to perform in a timely and safe manner): 1. none  Psychosocial Skills Affected:  1. none    Number of elements that affect the Plan of Care: 1-3:  LOW COMPLEXITY   CLINICAL DECISION MAKING:   [facility] AM-PAC 6 Clicks   Daily Activity Inpatient Short Form  How much help from another person does the patient currently need. .. Total A Lot A Little None   1. Putting on and taking off regular lower body clothing? [] 1   [] 2   [x] 3   [] 4   2. Bathing (including washing, rinsing, drying)? [] 1   [] 2   [] 3   [x] 4   3. Toileting, which includes using toilet, bedpan or urinal?   [] 1   [] 2   [] 3   [x] 4   4. Putting on and taking off regular upper body clothing? [] 1   [] 2   [] 3   [x] 4   5. Taking care of personal grooming such as brushing teeth? [] 1   [] 2   [] 3   [x] 4   6. Eating meals? [] 1   [] 2   [] 3   [x] 4   © 2007, Trustees of [facility], under license to [employer]. All rights reserved     Score:  Initial: 19 Most Recent: 23 (Date:3-19-20 )    Interpretation of Tool:  Represents activities that are increasingly more difficult (i.e. Bed mobility, Transfers, Gait). Medical Necessity:     · Patient is expected to demonstrate progress in   · strength, balance, coordination, and functional technique  ·  to   · increase independence with self care and functional mobility   · .   Reason for Services/Other Comments:  · Patient continues to require skilled intervention due to   · Decrease self care and functional mobility   · . Use of outcome tool(s) and clinical judgement create a POC that gives a: LOW COMPLEXITY            TREATMENT:   (In addition to Assessment/Re-Assessment sessions the following treatments were rendered)     Pre-treatment Symptoms/Complaints:  tolerated shower  Pain: Initial:   Pain Intensity 1: 3  Pain Location 1: Knee  Pain Orientation 1: Right  Pain Intervention(s) 1: Repositioned  Post Session:  3     Self Care: (40): Procedure(s) (per grid) utilized to improve and/or restore self-care/home management as related to dressing, bathing, toileting and grooming. Required minimal verbal and   cueing to facilitate activities of daily living skills and compensatory activities. Treatment/Session Assessment:     Response to Treatment:  tolerated well. Education:  [] Home Exercises  [x] Fall Precautions  [] Hip Precautions [] Going Home Video  [x] Knee/Hip Prosthesis Review  [x] Walker Management/Safety [x] Adaptive Equipment as Needed       Interdisciplinary Collaboration:   o Physical Therapy Assistant  o Occupational Therapist  o Registered Nurse    After treatment position/precautions:   o Up in chair  o Bed/Chair-wheels locked  o Call light within reach  o RN notified  o LEs reclined     Compliance with Program/Exercises: Compliant all of the time. Recommendations/Intent for next treatment session: Pt doing well all goals met and will do well at home with support from . Patient will be discharged home with home health PT. No further Occupational Therapy warranted, will discharge Occupational Therapy services.        Total Treatment Duration:  OT Patient Time In/Time Out  Time In: 0730  Time Out: [address] [facility],

## 2020-03-19 NOTE — PROGRESS NOTES
Problem: Mobility Impaired (Adult and Pediatric)  Goal: *Acute Goals and Plan of Care (Insert Text)  Note: GOALS (1-4 days):  (1.)Ms. Sergio Mata will move from supine to sit and sit to supine  in bed with SUPERVISION. (2.)Ms. Sergio Mata will transfer from bed to chair and chair to bed with SUPERVISION using the least restrictive device. (3.)Ms. Sergio Mata will ambulate with SUPERVISION for 125 feet with the least restrictive device. (4.)Ms. Sergio Mata will ambulate up/down 4 steps with bilateral  railing with CONTACT GUARD ASSIST with no device. (5.)Ms. Sergio Mata will increase right knee ROM to 5°-80°.  ________________________________________________________________________________________________      PHYSICAL THERAPY JOINT CAMP TKA: Daily Note and AM 3/19/2020  INPATIENT: Hospital Day: 2  Payor: [employer] / Plan: BLAKE Judd / Product Type: Commerical /      NAME/AGE/GENDER: Irene Maki is a 77 y.o. female   PRIMARY DIAGNOSIS:  Primary osteoarthritis of right knee [M17.11]   Procedure(s) and Anesthesia Type:     * RIGHT TARAS ROBOTIC TOTAL KNEE ARTHROPLASTY CEMENTED TKA SPINAL/ ADDUCTOR CANAL BLOCK DENNYS - Spinal (Right)  ICD-10: Treatment Diagnosis:    · Pain in Right Knee (M25.561)  · Stiffness of Right Knee, Not elsewhere classified (M25.661)  · Other abnormalities of gait and mobility (R26.89)      ASSESSMENT:     Ms. Sergio Mata presents S/P R TKA and is demonstrating a decline in her premorbid level of function. She would benefit from further PT while here to address these deficits: decreased R LE strength and ROM, standing balance, functional mobility and TKA awareness. She plans on going home at [place] with the support of her son. This am she needs to use the restroom. Assisted her to the [facility], she then amb in room and then stays up in recliner. She completes R LE exs. Wants to possibly go home tomorrow am  3/19 am stated I am having pain, that shot did not work. Performs TK  Exercises. Supine>EOB with Min A with some pain. Walk 30 ft using RW with Min A and work on gait pattern. Left with OT. This section established at most recent assessment   PROBLEM LIST (Impairments causing functional limitations):  1. Decreased Strength  2. Decreased ADL/Functional Activities  3. Decreased Transfer Abilities  4. Decreased Ambulation Ability/Technique  5. Decreased Balance  6. Increased Pain  7. Decreased Activity Tolerance  8. Increased Fatigue  9. Decreased Flexibility/Joint Mobility  10. Decreased Knowledge of Precautions  11. Decreased [place] with Home Exercise Program   INTERVENTIONS PLANNED: (Benefits and precautions of physical therapy have been discussed with the patient.)  1. Balance Exercise  2. Bed Mobility  3. Cold  4. Family Education  5. Gait Training  6. Home Exercise Program (HEP)  7. Therapeutic Activites  8. Therapeutic Exercise/Strengthening  9. Transfer Training  10. TKA education  11. Range of Motion: active/assisted/passive  12. Therapeutic Activities     TREATMENT PLAN: Frequency/Duration: Follow patient BID for duration of hospital stay to address above goals. Rehabilitation Potential For Stated Goals: Good     RECOMMENDED REHABILITATION/EQUIPMENT: (at time of discharge pending progress): Continue Skilled Therapy and Home Health: Physical Therapy. HISTORY:   History of Present Injury/Illness (Reason for Referral):  S/p R TKA  Past Medical History/Comorbidities:   Ms. Elenita Jernigan  has a past medical history of Adverse effect of anesthesia, Arthritis, Claustrophobia, Former cigarette smoker, GERD (gastroesophageal reflux disease), History of melanoma, Hypertension, and Ureteral stone. Ms. Elenita Jernigan  has a past surgical history that includes hx cataract removal (Bilateral, 12/2018,01/2019); hx tubal ligation; hx other surgical; and hx lithotripsy (03/05/2020).   Social History/Living Environment:   Home Environment: Private residence  # Steps to Enter: 14  Hand Rails : Right  One/Two Story Residence: One story  Living Alone: Yes  Support Systems: Child(apddy)  Patient Expects to be Discharged to[de-identified] Private residence  Current DME Used/Available at Home: keyonna Blunt, [address] [employer] [place] Jerod chair  Tub or Shower Type: Tub/Shower combination  Prior Level of Function/Work/Activity:  functionally I with ADls and amb   Number of Personal Factors/Comorbidities that affect the Plan of Care: 1-2: MODERATE COMPLEXITY   EXAMINATION:   Most Recent Physical Functioning:    L LE ROM WFL       Strength 3+/5       Sensation slightly diminished from spinal    R LE ROM knee 20-60       Strength hip flex, knee ext and ankle DF 2+      Sensation slightly diminished from spinal    BALANCE: Sitting good      Standing fair with support of RW    TRANSFERS; sit to stand min A     Stand to sit CGA       SPT with min A and RW      Toilet min A with RW    BED mOBILITY; Supine to sit with min A                        Bed Mobility  Supine to Sit: Contact guard assistance  Sit to Supine: (left up in chair)    Transfers  Sit to Stand: Minimum assistance  Stand to Sit: Contact guard assistance    Balance  Sitting: Intact  Standing: Pull to stand; With support     AMB  30' with RW and close CGA       Antalgic, step to gt pattern     Decreased step length on L      Decreased stance on R         Weight Bearing Status  Right Side Weight Bearing: As tolerated  Distance (ft): 30 Feet (ft)  Ambulation - Level of Assistance: Minimal assistance  Assistive Device: Walker, rolling  Speed/[place]: Pace decreased (<100 feet/min)  Step Length: Left shortened  Stance: Right decreased  Gait Abnormalities: Antalgic  Interventions: Safety awareness training     Braces/Orthotics:    Right Knee Cold  Type: Cryocuff      Body Structures Involved:  1. Bones  2. Joints  3. Muscles Body Functions Affected:  1. Neuromusculoskeletal  2. Movement Related Activities and Participation Affected:  1. General Tasks and Demands  2. Mobility  3.  Self Care   Number of elements that affect the Plan of Care: 4+: HIGH COMPLEXITY   CLINICAL PRESENTATION:   Presentation: Stable and uncomplicated: LOW COMPLEXITY   CLINICAL DECISION MAKIN [facility] [address] [id number] AM-PAC 6 Clicks   Basic Mobility Inpatient Short Form  How much difficulty does the patient currently have. .. Unable A Lot A Little None   1. Turning over in bed (including adjusting bedclothes, sheets and blankets)? [] 1   [] 2   [x] 3   [] 4   2. Sitting down on and standing up from a chair with arms ( e.g., wheelchair, bedside commode, etc.)   [] 1   [] 2   [x] 3   [] 4   3. Moving from lying on back to sitting on the side of the bed? [] 1   [] 2   [x] 3   [] 4   How much help from another person does the patient currently need. .. Total A Lot A Little None   4. Moving to and from a bed to a chair (including a wheelchair)? [] 1   [] 2   [x] 3   [] 4   5. Need to walk in hospital room? [] 1   [] 2   [x] 3   [] 4   6. Climbing 3-5 steps with a railing? [] 1   [x] 2   [] 3   [] 4   © , Trustees of [address] [place] [address] [address] [id number], under license to [employer]. All rights reserved     Score:  Initial: 17 Most Recent: X (Date: -- )    Interpretation of Tool:  Represents activities that are increasingly more difficult (i.e. Bed mobility, Transfers, Gait). Medical Necessity:     · Patient demonstrates good rehab potential due to higher previous functional level. Reason for Services/Other Comments:  · Patient continues to require present interventions due to patient's inability to perform functional mobility at a safe supervision level.    Use of outcome tool(s) and clinical judgement create a POC that gives a: Clear prediction of patient's progress: LOW COMPLEXITY            TREATMENT:   (In addition to Assessment/Re-Assessment sessions the following treatments were rendered)     Pre-treatment Symptoms/Complaints:  Still having pain  Pain Initial:   Pain Intensity 1: 3(more pain after therapy)  Post Session: Therapeutic Exercise: (15 Minutes 10):  Exercises per grid below to improve mobility, strength and coordination. Required minimal visual, verbal and tactile cues to promote proper body alignment and promote proper body breathing techniques. Progressed range, repetitions and complexity of movement as indicated. Gait Training (15 Minutes):  Gait training to improve and/or restore physical functioning as related to mobility. Ambulated 30 Feet (ft) with Minimal assistance using a Walker, rolling and minimal Safety awareness training related to their knee position and motion to promote proper body alignment. Date:  3/18/20 Date:  3/19   Date:     ACTIVITY/EXERCISE AM PM AM PM AM PM   GROUP THERAPY  []  []  []  []  []  []   Ankle Pumps 10  15      Quad Sets 10  15      Gluteal Sets 10  15      Hip ABd/ADduction 10  15      Straight Leg Raises 10AA  15 aa      Knee Slides 10AA  15 aa      Short Arc Quads 10  15      Long Arc Quads         Chair Slides                  B = bilateral; AA = active assistive; A = active; P = passive      Treatment/Session Assessment:     Response to Treatment:  Making slow progress, due to pain. Education:  [x] Home Exercises  [x] Fall Precautions  []  [] D/C Instruction Review  [] Knee Prosthesis Review  [x] Walker Management/Safety [] Adaptive Equipment as Needed       Interdisciplinary Collaboration:   o Physical Therapy Assistant  o Registered Nurse    After treatment position/precautions:   o Up in chair  o Bed/Chair-wheels locked  o Call light within reach  o RN notified  o Family at bedside    Compliance with Program/Exercises: Will assess as treatment progresses. Recommendations/Intent for next treatment session:  Treatment next visit will focus on increasing Ms. Yazan's independence with bed mobility, transfers, gait training, strength/ROM exercises, modalities for pain, and patient education.       Total Treatment Duration:  PT Patient Time In/Time Out  Time In: 0700  Time Out: [address], PTA

## 2020-03-20 VITALS
SYSTOLIC BLOOD PRESSURE: 115 MMHG | WEIGHT: 177 LBS | HEIGHT: 66 IN | HEART RATE: 99 BPM | OXYGEN SATURATION: 99 % | TEMPERATURE: 98.2 F | DIASTOLIC BLOOD PRESSURE: 72 MMHG | RESPIRATION RATE: 16 BRPM | BODY MASS INDEX: 28.45 KG/M2

## 2020-03-20 LAB — HGB BLD-MCNC: 11.9 G/DL (ref 11.7–15.4)

## 2020-03-20 PROCEDURE — 97116 GAIT TRAINING THERAPY: CPT

## 2020-03-20 PROCEDURE — 36415 COLL VENOUS BLD VENIPUNCTURE: CPT

## 2020-03-20 PROCEDURE — 74011250637 HC RX REV CODE- 250/637: Performed by: ORTHOPAEDIC SURGERY

## 2020-03-20 PROCEDURE — 74011250637 HC RX REV CODE- 250/637: Performed by: NURSE PRACTITIONER

## 2020-03-20 PROCEDURE — 97110 THERAPEUTIC EXERCISES: CPT

## 2020-03-20 PROCEDURE — 74011250637 HC RX REV CODE- 250/637: Performed by: PHYSICIAN ASSISTANT

## 2020-03-20 PROCEDURE — 85018 HEMOGLOBIN: CPT

## 2020-03-20 RX ADMIN — SENNOSIDES AND DOCUSATE SODIUM 2 TABLET: 8.6; 5 TABLET ORAL at 08:58

## 2020-03-20 RX ADMIN — ACETAMINOPHEN 1000 MG: 500 TABLET, FILM COATED ORAL at 02:30

## 2020-03-20 RX ADMIN — BUPROPION HYDROCHLORIDE 150 MG: 150 TABLET, EXTENDED RELEASE ORAL at 08:58

## 2020-03-20 RX ADMIN — ACETAMINOPHEN 1000 MG: 500 TABLET, FILM COATED ORAL at 08:58

## 2020-03-20 RX ADMIN — CELECOXIB 200 MG: 200 CAPSULE ORAL at 08:58

## 2020-03-20 RX ADMIN — ASPIRIN 81 MG: 81 TABLET ORAL at 08:58

## 2020-03-20 RX ADMIN — HYDROMORPHONE HYDROCHLORIDE 2 MG: 2 TABLET ORAL at 08:58

## 2020-03-20 RX ADMIN — HYDROMORPHONE HYDROCHLORIDE 2 MG: 2 TABLET ORAL at 02:30

## 2020-03-20 NOTE — DISCHARGE INSTRUCTIONS
[facility]   Patient Discharge Instructions    Brandie Brine / [id number] : 1950    Admitted 3/18/2020 Discharged: 3/18/2020     IF YOU HAVE ANY PROBLEMS ONCE YOU ARE AT HOME CALL THE FOLLOWING NUMBERS:   Main office number: [phone]      Medications    · The medications you are to continue on are listed on the medication reconciliation sheet. · Narcotic pain medications as well as supplemental iron can cause constipation. If this occurs try stopping the narcotic pain medication and/or the iron. · It is important that you take the medication exactly as they are prescribed. · Medications which increase your risk of blood clots are listed to stop for 5 weeks after surgery as well as medications or supplements which increase your risk of bleeding complications. · Keep your medication in the bottles provided by the pharmacist and keep a list of the medication names, dosages, and times to be taken in your wallet. · Do not take other medications without consulting your doctor. Important Information    Do NOT smoke as this will greatly increase your risk of infection! Resume your prehospital diet. If you have excessive nausea or vomitting call your doctor's office     Leg swelling and warmth is normal for 6 months after surgery. If you experience swelling in your leg elevate you leg while laying down with your toes above your heart. If you have sudden onset severe swelling with leg pain call our office. The stitches deep inside take approximately 6 months to dissolve. There will be sharp shooting, stinging and burning pain. This is normal and will resolve between 3-6 months after surgery. Difficulty sleeping is normal following total Knee and Hip replacement. You may try melatonin, an over-the-counter sleep aid or benadryl to help with sleep. Most patients will resume sleeping through the night 8 weeks after surgery. Home Physical Therapy is arranged.  Home Health will contact you within 48 hrs of discharge that you have chosen. If you have not received a call within this time frame please contact that provider you chose. You should be given this information before you leave the hospital.     You are at a risk for falls. Use the rolling walker when walking. Patients who have had a joint replacement should not drive if they are still taking narcotic pain mediation during the daytime hours. Most patients wean themselves off of pain medication within 2-5 weeks after surgery. When to Call the office    - If you have a temperature greater then 101  - Uncontrolled vomiting   - Loose control of your bladder or bowel function  - Are unable to bear any weight   - Need a pain medication refill       DISCHARGE SUMMARY from Nurse    The following personal items collected during your admission are returned to you:   Dental Appliance: Dental Appliances: Lowers, Uppers, With patient  Vision: Visual Aid: Glasses  Hearing Aid:    Jewelry: Jewelry: None  Clothing: Clothing: None  Other Valuables: Other Valuables: Cell Phone(with [employer])  Valuables sent to safe:      PATIENT INSTRUCTIONS:    After general anesthesia or intravenous sedation, for 24 hours or while taking prescription Narcotics:  · Limit your activities  · Do not drive and operate hazardous machinery  · Do not make important personal or business decisions  · Do  not drink alcoholic beverages  · If you have not urinated within 8 hours after discharge, please contact your surgeon on call.     Report the following to your surgeon:  · Excessive pain, swelling, redness or odor of or around the surgical area  · Temperature over 101  · Nausea and vomiting lasting longer than 4 hours or if unable to take medications  · Any signs of decreased circulation or nerve impairment to extremity: change in color, persistent  numbness, tingling, coldness or increase pain  · Any questions, call office @ [phone]      Keep scheduled follow up appointment. If need to change, call office @ [phone]. *  Please give a list of your current medications to your Primary Care Provider. *  Please update this list whenever your medications are discontinued, doses are      changed, or new medications (including over-the-counter products) are added. *  Please carry medication information at all times in case of emergency situations. Patient Education        Total Knee Replacement: What to Expect at Home  Your Recovery    When you leave the hospital, you should be able to move around with a walker or crutches. But you will need someone to help you at home for the next few weeks or until you have more energy and can move around better. If you need more extensive rehab, you may go to a specialized rehab center for more treatment. You will go home with a bandage and stitches, staples, tissue glue, or tape strips. Change the bandage as your doctor tells you to. If you have stitches or staples, your doctor will remove them 10 to 21 days after your surgery. Glue or tape strips will fall off on their own over time. You may still have some mild pain, and the area may be swollen for 3 to 6 months after surgery. Your knee will continue to improve for 6 to 12 months. You will probably use a walker for 1 to 3 weeks and then use crutches. When you are ready, you can use a cane. You will probably be able to walk on your own in 4 to 8 weeks. You will need to do months of physical rehabilitation (rehab) after a knee replacement. Rehab will help you strengthen the muscles of the knee and help you regain movement. After you recover, your artificial knee will allow you to do normal daily activities with less pain or no pain at all. You may be able to hike, dance, ride a bike, and play golf. Talk to your doctor about whether you can do more strenuous activities. Always tell your caregivers that you have an artificial knee.   How long it will take to walk on your own, return to normal activities, and go back to work depends on your health and how well your rehabilitation (rehab) program goes. The better you do with your rehab exercises, the quicker you will get your strength and movement back. This care sheet gives you a general idea about how long it will take for you to recover. But each person recovers at a different pace. Follow the steps below to get better as quickly as possible. How can you care for yourself at home? Activity    · Rest when you feel tired. You may take a nap, but do not stay in bed all day. When you sit, use a chair with arms. You can use the arms to help you stand up.     · Work with your physical therapist to find the best way to exercise. What you can do as your knee heals will depend on whether your new knee is cemented or uncemented. You may not be able to do certain things for a while if your new knee is uncemented.     · After your knee has healed enough, you can do more strenuous activities with caution. ? You can golf, but use a golf cart, and do not wear shoes with spikes. ? You can bike on a flat road or on a stationary bike. Avoid biking up hills. ? Your doctor may suggest that you stay away from activities that put stress on your knee. These include tennis or badminton, squash or racquetball, contact sports like football, jumping (such as in basketball), jogging, or running. ? Avoid activities where you might fall. These include horseback riding, skiing, and mountain biking.     · Do not sit for more than 1 hour at a time. Get up and walk around for a while before you sit again. If you must sit for a long time, prop up your leg with a chair or footstool. This will help you avoid swelling.     · Ask your doctor when you can drive again. It may take up to 8 weeks after knee replacement surgery before it is safe for you to drive.     · When you get into a car, sit on the edge of the seat.  Then pull in your legs, and turn to face the front.     · You should be able to do many everyday activities 3 to 6 weeks after your surgery. You will probably need to take 4 to 16 weeks off from work. When you can go back to work depends on the type of work you do and how you feel.     · Ask your doctor when it is okay for you to have sex.     · Do not lift anything heavier than 10 pounds and do not lift weights for 12 weeks. Diet    · By the time you leave the hospital, you should be eating your normal diet. If your stomach is upset, try bland, low-fat foods like plain rice, broiled chicken, toast, and yogurt. Your doctor may suggest that you take iron and vitamin supplements.     · Drink plenty of fluids (unless your doctor tells you not to).   · Eat healthy foods, and watch your portion sizes. Try to stay at your ideal weight. Too much weight puts more stress on your new knee.     · You may notice that your bowel movements are not regular right after your surgery. This is common. Try to avoid constipation and straining with bowel movements. You may want to take a fiber supplement every day. If you have not had a bowel movement after a couple of days, ask your doctor about taking a mild laxative. Medicines    · Your doctor will tell you if and when you can restart your medicines. He or she will also give you instructions about taking any new medicines.     · If you take aspirin or some other blood thinner, ask your doctor if and when to start taking it again. Make sure that you understand exactly what your doctor wants you to do.     · Your doctor may give you a blood-thinning medicine to prevent blood clots. If you take a blood thinner, be sure you get instructions about how to take your medicine safely. Blood thinners can cause serious bleeding problems. This medicine could be in pill form or as a shot (injection). If a shot is necessary, your doctor will tell you how to do this.     · Be safe with medicines.  Take pain medicines exactly as directed. ? If the doctor gave you a prescription medicine for pain, take it as prescribed. ? If you are not taking a prescription pain medicine, ask your doctor if you can take an over-the-counter medicine. ? Plan to take your pain medicine 30 minutes before exercises. It is easier to prevent pain before it starts than to stop it once it has started.     · If you think your pain medicine is making you sick to your stomach:  ? Take your medicine after meals (unless your doctor has told you not to). ? Ask your doctor for a different pain medicine.     · If your doctor prescribed antibiotics, take them as directed. Do not stop taking them just because you feel better. You need to take the full course of antibiotics. Incision care    · If your doctor told you how to care for your cut (incision), follow your doctor's instructions. You will have a dressing over the cut. A dressing helps the incision heal and protects it. Your doctor will tell you how to take care of this.     · If you did not get instructions, follow this general advice:  ? If you have strips of tape on the cut the doctor made, leave the tape on for a week or until it falls off.  ? If you have stitches or staples, your doctor will tell you when to come back to have them removed. ? If you have skin adhesive on the cut, leave it on until it falls off. Skin adhesive is also called glue or liquid stitches. ? Change the bandage every day. ? Wash the area daily with warm water, and pat it dry. Don't use hydrogen peroxide or alcohol. They can slow healing. ? You may cover the area with a gauze bandage if it oozes fluid or rubs against clothing. ? You may shower 24 to 48 hours after surgery. Pat the incision dry. Don't swim or take a bath for the first 2 weeks, or until your doctor tells you it is okay. Exercise    · Your rehab program will give you a number of exercises to do to help you get back your knee's range of motion and strength.  Always do them as your therapist tells you. Ice and elevation    · For pain and swelling, put ice or a cold pack on the area for 10 to 20 minutes at a time. Put a thin cloth between the ice and your skin. Other instructions    · Continue to wear your support stockings as your doctor says. These help to prevent blood clots. The length of time that you will have to wear them depends on your activity level and the amount of swelling.     · You have metal pieces in your knee. These may set off some airport metal detectors. Carry a medical alert card that says you have an artificial joint, just in case. Follow-up care is a key part of your treatment and safety. Be sure to make and go to all appointments, and call your doctor if you are having problems. It's also a good idea to know your test results and keep a list of the medicines you take. When should you call for help? Call 911 anytime you think you may need emergency care. For example, call if:    · You passed out (lost consciousness).     · You have severe trouble breathing.     · You have sudden chest pain and shortness of breath, or you cough up blood.    Call your doctor now or seek immediate medical care if:    · You have signs of infection, such as:  ? Increased pain, swelling, warmth, or redness. ? Red streaks leading from the incision. ? Pus draining from the incision. ? A fever.     · You have signs of a blood clot, such as:  ? Pain in your calf, back of the knee, thigh, or groin. ? Redness and swelling in your leg or groin.     · Your incision comes open and begins to bleed, or the bleeding increases.     · You have pain that does not get better after you take pain medicine.    Watch closely for changes in your health, and be sure to contact your doctor if:    · You do not have a bowel movement after taking a laxative. Where can you learn more?   Go to http://karlo-yen.info/  Enter T054 in the search box to learn more about \"Total Knee Replacement: What to Expect at Home. \"  Current as of: June 26, 2019Content Version: 12.4  © 1726-2938 [employer]. Care instructions adapted under license by [employer] (which disclaims liability or warranty for this information). If you have questions about a medical condition or this instruction, always ask your healthcare professional. [address] any warranty or liability for your use of this information. Preventing the Spread of Coronavirus Disease 2019 in Homes and Residential Communities   For the most recent information go to [employer]    Prevention steps for People with confirmed or suspected COVID-19 (including persons under investigation) who do not need to be hospitalized  and   People with confirmed COVID-19 who were hospitalized and determined to be medically stable to go home    Your healthcare provider and public health staff will evaluate whether you can be cared for at home. If it is determined that you do not need to be hospitalized and can be isolated at home, you will be monitored by staff from your local or state health department. You should follow the prevention steps below until a healthcare provider or local or state health department says you can return to your normal activities. Stay home except to get medical care  People who are mildly ill with COVID-19 are able to isolate at home during their illness. You should restrict activities outside your home, except for getting medical care. Do not go to work, school, or public areas. Avoid using public transportation, ride-sharing, or taxis. Separate yourself from other people and animals in your home  People: As much as possible, you should stay in a specific room and away from other people in your home. Also, you should use a separate bathroom, if available. Animals:  You should restrict contact with pets and other animals while you are sick with COVID-19, just like you would around other people. Although there have not been reports of pets or other animals becoming sick with COVID-19, it is still recommended that people sick with COVID-19 limit contact with animals until more information is known about the virus. When possible, have another member of your household care for your animals while you are sick. If you are sick with COVID-19, avoid contact with your pet, including petting, snuggling, being kissed or licked, and sharing food. If you must care for your pet or be around animals while you are sick, wash your hands before and after you interact with pets and wear a facemask. Call ahead before visiting your doctor  If you have a medical appointment, call the healthcare provider and tell them that you have or may have COVID-19. This will help the healthcare providers office take steps to keep other people from getting infected or exposed. Wear a facemask  You should wear a facemask when you are around other people (e.g., sharing a room or vehicle) or pets and before you enter a healthcare providers office. If you are not able to wear a facemask (for example, because it causes trouble breathing), then people who live with you should not stay in the same room with you, or they should wear a facemask if they enter your room. Cover your coughs and sneezes  Cover your mouth and nose with a tissue when you cough or sneeze. Throw used tissues in a lined trash can. Immediately wash your hands with soap and water for at least 20 seconds or, if soap and water are not available, clean your hands with an alcohol-based hand  that contains at least 60% alcohol. Clean your hands often  Wash your hands often with soap and water for at least 20 seconds, especially after blowing your nose, coughing, or sneezing; going to the bathroom; and before eating or preparing food.  If soap and water are not readily available, use an alcohol-based hand  with at least 60% alcohol, covering all surfaces of your hands and rubbing them together until they feel dry. Soap and water are the best option if hands are visibly dirty. Avoid touching your eyes, nose, and mouth with unwashed hands. Avoid sharing personal household items  You should not share dishes, drinking glasses, cups, eating utensils, towels, or bedding with other people or pets in your home. After using these items, they should be washed thoroughly with soap and water. Clean all high-touch surfaces everyday  High touch surfaces include counters, tabletops, doorknobs, bathroom fixtures, toilets, phones, keyboards, tablets, and bedside tables. Also, clean any surfaces that may have blood, stool, or body fluids on them. Use a household cleaning spray or wipe, according to the label instructions. Labels contain instructions for safe and effective use of the cleaning product including precautions you should take when applying the product, such as wearing gloves and making sure you have good ventilation during use of the product. Monitor your symptoms  Seek prompt medical attention if your illness is worsening (e.g., difficulty breathing). Before seeking care, call your healthcare provider and tell them that you have, or are being evaluated for, COVID-19. Put on a facemask before you enter the facility. These steps will help the healthcare providers office to keep other people in the office or waiting room from getting infected or exposed. Ask your healthcare provider to call the local or state health department. Persons who are placed under active monitoring or facilitated self-monitoring should follow instructions provided by their local health department or occupational health professionals, as appropriate. When working with your local health department check their available hours.   If you have a medical emergency and need to call 911, notify the dispatch personnel that you have, or are being evaluated for COVID-19. If possible, put on a facemask before emergency medical services arrive. Discontinuing home isolation  Patients with confirmed COVID-19 should remain under home isolation precautions until the risk of secondary transmission to others is thought to be low. The decision to discontinue home isolation precautions should be made on a case-by-case basis, in consultation with healthcare providers and state and local health departments. These are general instructions for a healthy lifestyle:    No smoking/ No tobacco products/ Avoid exposure to second hand smoke    Surgeon General's Warning:  Quitting smoking now greatly reduces serious risk to your health. Obesity, smoking, and sedentary lifestyle greatly increases your risk for illness    A healthy diet, regular physical exercise & weight monitoring are important for maintaining a healthy lifestyle    You may be retaining fluid if you have a history of heart failure or if you experience any of the following symptoms:  Weight gain of 3 pounds or more overnight or 5 pounds in a week, increased swelling in our hands or feet or shortness of breath while lying flat in bed. Please call your doctor as soon as you notice any of these symptoms; do not wait until your next office visit. Recognize signs and symptoms of STROKE:    F-face looks uneven    A-arms unable to move or move even    S-speech slurred or non-existent    T-time-call 911 as soon as signs and symptoms begin-DO NOT go       Back to bed or wait to see if you get better-TIME IS BRAIN. The discharge information has been reviewed with the patient. The patient verbalized understanding. Information obtained by :  I understand that if any problems occur once I am at home I am to contact my physician. I understand and acknowledge receipt of the instructions indicated above. Physician's or R.N.'s Signature                                                                  Date/Time                                                                                                                                              Patient or Representative Signature                                                          Date/Time

## 2020-03-20 NOTE — PROGRESS NOTES
2020         Post Op day: 2 Days Post-OpProcedure(s) (LRB):  RIGHT TARAS ROBOTIC TOTAL KNEE ARTHROPLASTY CEMENTED TKA SPINAL/ ADDUCTOR CANAL BLOCK DENNYS (Right)      Admit Date: 3/18/2020  Admit Diagnosis: Primary osteoarthritis of right knee [M17.11]  Osteoarthritis [M19.90]    LAB:    Recent Results (from the past 24 hour(s))   HEMOGLOBIN    Collection Time: 20  4:30 AM   Result Value Ref Range    HGB 11.9 11.7 - 15.4 g/dL     Vital Signs:    Patient Vitals for the past 8 hrs:   BP Temp Pulse Resp SpO2   20 0426 125/61 98 °F (36.7 °C) 86 16 96 %   20 0023 126/77 98 °F (36.7 °C) 93 16 94 %     Temp (24hrs), Av °F (36.7 °C), Min:97.7 °F (36.5 °C), Max:98.1 °F (36.7 °C)    Body mass index is 28.57 kg/m².   Pain Control:   Pain Assessment  Pain Scale 1: Numeric (0 - 10)  Pain Intensity 1: 5  Pain Onset 1: (yrs ago)  Pain Location 1: Knee  Pain Orientation 1: Right  Pain Description 1: Constant  Pain Intervention(s) 1: Medication (see MAR)    Subjective: Doing well, No complaints, No SOB, No Chest Pain, No nausea or vomiting     Objective: Vital Signs are Stable, No Acute Distress, Alert and Oriented, Dressing is dry,  Neurovascular exam is normal.       PT/OT:            Assistive Device: Walker (comment)  RLE AROM  R Knee Flexion: 60  R Knee Extension: 20             Wieght Bearing Status: WBAT    Meds:  [unfilled]  [unfilled]  [unfilled]    Assessment:   Patient Active Problem List   Diagnosis Code    Osteoarthritis M19.90    Status post total right knee replacement Z96.651             Plan: Continue Physical Therapy, Monitor labs, home today        Signed By: Immanuel Klein MD

## 2020-03-20 NOTE — PROGRESS NOTES
Shift assessment completed. Pt is alert & oriented x4. Able to verbalize needs. Resting quietly with no distress noted. Dressing to right knee is clean, dry and intact. Fredirick Dies in place. Neurovascular and peripheral vascular checks WNL. Incentive Spirometry at bedside. Patient encouraged to use hourly x 10 repetitions. Patient voiding clear yellow urine without difficulty. Pain is being managed with Dilaudid with patient tolerating well. Bed low and locked. Call light within reach. Patient instructed to call for assistance. Pt verbalizes understanding. Will monitor.

## 2020-03-20 NOTE — PROGRESS NOTES
Problem: Mobility Impaired (Adult and Pediatric)  Goal: *Acute Goals and Plan of Care (Insert Text)  Note: GOALS (1-4 days):  (1.)Ms. Monet Allred will move from supine to sit and sit to supine  in bed with SUPERVISION. (2.)Ms. Monet Allred will transfer from bed to chair and chair to bed with SUPERVISION using the least restrictive device. (3.)Ms. Monet Allred will ambulate with SUPERVISION for 125 feet with the least restrictive device. (4.)Ms. Monet Allred will ambulate up/down 4 steps with bilateral  railing with CONTACT GUARD ASSIST with no device. (5.)Ms. Monet Allred will increase right knee ROM to 5°-80°.  ________________________________________________________________________________________________      PHYSICAL THERAPY JOINT CAMP TKA: Daily Note and AM 3/20/2020  INPATIENT: Hospital Day: 3  [place] / [place] / Product Type: Medicare /      NAME/AGE/GENDER: Pablo Hancock is a 77 y.o. female   PRIMARY DIAGNOSIS:  Primary osteoarthritis of right knee [M17.11]   Procedure(s) and Anesthesia Type:     * RIGHT TARAS ROBOTIC TOTAL KNEE ARTHROPLASTY CEMENTED TKA SPINAL/ ADDUCTOR CANAL BLOCK DENNYS - Spinal (Right)  ICD-10: Treatment Diagnosis:    · Pain in Right Knee (M25.561)  · Stiffness of Right Knee, Not elsewhere classified (M25.661)  · Other abnormalities of gait and mobility (R26.89)      ASSESSMENT:     Ms. Monet Allred presents S/P R TKA and is demonstrating a decline in her premorbid level of function. She would benefit from further PT while here to address these deficits: decreased R LE strength and ROM, standing balance, functional mobility and TKA awareness. She plans on going home at [place] with the support of her son. This am she needs to use the restroom. Assisted her to the [facility], she then amb in room and then stays up in recliner. She completes R LE exs. Wants to possibly go home tomorrow am  3/19 am stated I am having pain, that shot did not work. Performs TK  Exercises.   Supine>EOB with Min A with some pain.  Walk 30 ft using RW with Min A and work on gait pattern. Left with OT.  3/19 pm  Sitting in the chair upon arrival.  Performs exercises with less pain this afternoon. Increase distance using correct technique and no LOB. Remain in the chair with needs in reach.  3/20 am sitting in the chair upon arrival.  Performs TK exercises without increase in pain. Walks 140 ft using RW with SBA. Return to chair with needs in reach. All question answer and ready for D/C.  11:00 therapist took pt down to practice car transfers with verbal cues for hand and leg placement. Therapist review how to get out of the car. Left the building. This section established at most recent assessment   PROBLEM LIST (Impairments causing functional limitations):  1. Decreased Strength  2. Decreased ADL/Functional Activities  3. Decreased Transfer Abilities  4. Decreased Ambulation Ability/Technique  5. Decreased Balance  6. Increased Pain  7. Decreased Activity Tolerance  8. Increased Fatigue  9. Decreased Flexibility/Joint Mobility  10. Decreased Knowledge of Precautions  11. Decreased Lee with Home Exercise Program   INTERVENTIONS PLANNED: (Benefits and precautions of physical therapy have been discussed with the patient.)  1. Balance Exercise  2. Bed Mobility  3. Cold  4. Family Education  5. Gait Training  6. Home Exercise Program (HEP)  7. Therapeutic Activites  8. Therapeutic Exercise/Strengthening  9. Transfer Training  10. TKA education  11. Range of Motion: active/assisted/passive  12. Therapeutic Activities     TREATMENT PLAN: Frequency/Duration: Follow patient BID for duration of hospital stay to address above goals. Rehabilitation Potential For Stated Goals: Good     RECOMMENDED REHABILITATION/EQUIPMENT: (at time of discharge pending progress): Continue Skilled Therapy and Home Health: Physical Therapy.               HISTORY:   History of Present Injury/Illness (Reason for Referral):  S/p R TKA  Past Medical History/Comorbidities:   Ms. Tamie Sauceda  has a past medical history of Adverse effect of anesthesia, Arthritis, Claustrophobia, Former cigarette smoker, GERD (gastroesophageal reflux disease), History of melanoma, Hypertension, and Ureteral stone. Ms. Tamie Sauceda  has a past surgical history that includes hx cataract removal (Bilateral, 12/2018,01/2019); hx tubal ligation; hx other surgical; and hx lithotripsy (03/05/2020).   Social History/Living Environment:   Home Environment: Private residence  # Steps to Enter: 14  Hand Rails : Right  One/Two Story Residence: One story  Living Alone: Yes  Support Systems: Child(paddy)  Patient Expects to be Discharged to[de-identified] Private residence  Current DME Used/Available at Home: Walker, rolling, [address] chair  Tub or Shower Type: Tub/Shower combination  Prior Level of Function/Work/Activity:  functionally I with ADls and amb   Number of Personal Factors/Comorbidities that affect the Plan of Care: 1-2: MODERATE COMPLEXITY   EXAMINATION:   Most Recent Physical Functioning:    L LE ROM WFL       Strength 3+/5       Sensation slightly diminished from spinal    R LE ROM knee 20-60       Strength hip flex, knee ext and ankle DF 2+      Sensation slightly diminished from spinal    BALANCE: Sitting good      Standing fair with support of RW    TRANSFERS; sit to stand min A     Stand to sit CGA       SPT with min A and RW      Toilet min A with RW    BED mOBILITY; Supine to sit with min A                             Transfers  Sit to Stand: Stand-by assistance  Stand to Sit: Stand-by assistance  Bed to Chair: Stand-by assistance  Car Transfer: Stand-by assistance    Balance  Sitting: Intact  Standing: With support     AMB  30' with RW and close CGA       Antalgic, step to gt pattern     Decreased step length on L      Decreased stance on R         Weight Bearing Status  Right Side Weight Bearing: As tolerated  Distance (ft): 140 Feet (ft)  Ambulation - Level of Assistance: Stand-by assistance  Assistive Device: Walker, rolling  Speed/[place]: Pace decreased (<100 feet/min)  Step Length: Left shortened  Stance: Right decreased  Gait Abnormalities: Antalgic  Interventions: Safety awareness training     Braces/Orthotics:    Right Knee Cold  Type: Cryocuff      Body Structures Involved:  1. Bones  2. Joints  3. Muscles Body Functions Affected:  1. Neuromusculoskeletal  2. Movement Related Activities and Participation Affected:  1. General Tasks and Demands  2. Mobility  3. Self Care   Number of elements that affect the Plan of Care: 4+: HIGH COMPLEXITY   CLINICAL PRESENTATION:   Presentation: Stable and uncomplicated: LOW COMPLEXITY   CLINICAL DECISION MAKIN[address] [place] [address] AM-PAC 6 Clicks   Basic Mobility Inpatient Short Form  How much difficulty does the patient currently have. .. Unable A Lot A Little None   1. Turning over in bed (including adjusting bedclothes, sheets and blankets)? [] 1   [] 2   [x] 3   [] 4   2. Sitting down on and standing up from a chair with arms ( e.g., wheelchair, bedside commode, etc.)   [] 1   [] 2   [x] 3   [] 4   3. Moving from lying on back to sitting on the side of the bed? [] 1   [] 2   [x] 3   [] 4   How much help from another person does the patient currently need. .. Total A Lot A Little None   4. Moving to and from a bed to a chair (including a wheelchair)? [] 1   [] 2   [x] 3   [] 4   5. Need to walk in hospital room? [] 1   [] 2   [x] 3   [] 4   6. Climbing 3-5 steps with a railing? [] 1   [x] 2   [] 3   [] 4   © , Trustees of [address] [place] [address], under license to [employer]. All rights reserved     Score:  Initial: 17 Most Recent: X (Date: -- )    Interpretation of Tool:  Represents activities that are increasingly more difficult (i.e. Bed mobility, Transfers, Gait). Medical Necessity:     · Patient demonstrates good rehab potential due to higher previous functional level.   Reason for Services/Other Comments:  · Patient continues to require present interventions due to patient's inability to perform functional mobility at a safe supervision level. Use of outcome tool(s) and clinical judgement create a POC that gives a: Clear prediction of patient's progress: LOW COMPLEXITY            TREATMENT:   (In addition to Assessment/Re-Assessment sessions the following treatments were rendered)     Pre-treatment Symptoms/Complaints:  Less pain this afternoon  Pain Initial:   Pain Intensity 1: 0  Post Session:       Therapeutic Exercise: (15 Minutes ):  Exercises per grid below to improve mobility, strength and coordination. Required minimal visual, verbal and tactile cues to promote proper body alignment and promote proper body breathing techniques. Progressed range, repetitions and complexity of movement as indicated. Gait Training (25 Minutes):  Gait training to improve and/or restore physical functioning as related to mobility. Ambulated 140 Feet (ft) with Stand-by assistance using a Walker, rolling and minimal Safety awareness training related to their knee position and motion to promote proper body alignment. Date:  3/18/20 Date:  3/19   Date:  3/20     ACTIVITY/EXERCISE AM PM AM PM AM PM   GROUP THERAPY  []  []  []  []  []  []   Ankle Pumps 10  15 15 20    Quad Sets 10  15 15 20    Gluteal Sets 10  15 15 20    Hip ABd/ADduction 10  15 15 20    Straight Leg Raises 10AA  15 aa 15 20    Knee Slides 10AA  15 aa 15 20    Short Arc Quads 10  15 15 20    Long Arc Quads         Chair Slides    15 20             B = bilateral; AA = active assistive; A = active; P = passive      Treatment/Session Assessment:     Response to Treatment:  Making good progress.     Education:  [x] Home Exercises  [x] Fall Precautions  []  [] D/C Instruction Review  [] Knee Prosthesis Review  [x] Walker Management/Safety [] Adaptive Equipment as Needed       Interdisciplinary Collaboration:   o Physical Therapy Assistant  o Registered Nurse    After treatment position/precautions: o Up in chair  o Bed/Chair-wheels locked  o Call light within reach  o RN notified  o Family at bedside    Compliance with Program/Exercises: Will assess as treatment progresses. Recommendations/Intent for next treatment session:  Treatment next visit will focus on increasing Ms. Yazan's independence with bed mobility, transfers, gait training, strength/ROM exercises, modalities for pain, and patient education.       Total Treatment Duration:  PT Patient Time In/Time Out  Time In: 1015  Time Out: C/ Jessica 29 Papiager, PTA

## 2020-03-20 NOTE — PROGRESS NOTES
Care Management Interventions  PCP Verified by CM:  Yes  Mode of Transport at Discharge: Self  Transition of Care Consult (CM Consult): [address]: Yes  Discharge Durable Medical Equipment: No  Physical Therapy Consult: Yes  Occupational Therapy Consult: Yes  Current Support Network: Own Home  Confirm Follow Up Transport: Family  The Plan for Transition of Care is Related to the Following Treatment Goals : return to independent function  The Patient and/or Patient Representative was Provided with a Choice of Provider and Agrees with the Discharge Plan?: Yes  Freedom of Choice List was Provided with Basic Dialogue that Supports the Patient's Individualized Plan of Care/Goals, Treatment Preferences and Shares the Quality Data Associated with the Providers?: Yes  Discharge Location  Discharge Placement: Home with home health

## 2020-03-21 ENCOUNTER — HOME CARE VISIT (OUTPATIENT)
Dept: SCHEDULING | Facility: HOME HEALTH | Age: 70
End: 2020-03-21
Payer: MEDICARE

## 2020-03-21 PROCEDURE — 3331090002 HH PPS REVENUE DEBIT

## 2020-03-21 PROCEDURE — 3331090001 HH PPS REVENUE CREDIT

## 2020-03-21 PROCEDURE — 400013 HH SOC

## 2020-03-21 PROCEDURE — G0151 HHCP-SERV OF PT,EA 15 MIN: HCPCS

## 2020-03-22 VITALS
SYSTOLIC BLOOD PRESSURE: 110 MMHG | DIASTOLIC BLOOD PRESSURE: 70 MMHG | HEART RATE: 88 BPM | RESPIRATION RATE: 18 BRPM | TEMPERATURE: 97.7 F

## 2020-03-22 PROCEDURE — 3331090001 HH PPS REVENUE CREDIT

## 2020-03-22 PROCEDURE — 3331090002 HH PPS REVENUE DEBIT

## 2020-03-23 ENCOUNTER — HOME CARE VISIT (OUTPATIENT)
Dept: SCHEDULING | Facility: HOME HEALTH | Age: 70
End: 2020-03-23
Payer: MEDICARE

## 2020-03-23 VITALS
HEART RATE: 96 BPM | RESPIRATION RATE: 18 BRPM | SYSTOLIC BLOOD PRESSURE: 110 MMHG | DIASTOLIC BLOOD PRESSURE: 80 MMHG | TEMPERATURE: 97.8 F

## 2020-03-23 PROCEDURE — 3331090001 HH PPS REVENUE CREDIT

## 2020-03-23 PROCEDURE — G0151 HHCP-SERV OF PT,EA 15 MIN: HCPCS

## 2020-03-23 PROCEDURE — 3331090002 HH PPS REVENUE DEBIT

## 2020-03-24 PROCEDURE — 3331090001 HH PPS REVENUE CREDIT

## 2020-03-24 PROCEDURE — 3331090002 HH PPS REVENUE DEBIT

## 2020-03-25 ENCOUNTER — HOME CARE VISIT (OUTPATIENT)
Dept: SCHEDULING | Facility: HOME HEALTH | Age: 70
End: 2020-03-25
Payer: MEDICARE

## 2020-03-25 VITALS
SYSTOLIC BLOOD PRESSURE: 130 MMHG | RESPIRATION RATE: 18 BRPM | HEART RATE: 92 BPM | DIASTOLIC BLOOD PRESSURE: 82 MMHG | TEMPERATURE: 96.7 F

## 2020-03-25 PROCEDURE — G0151 HHCP-SERV OF PT,EA 15 MIN: HCPCS

## 2020-03-25 PROCEDURE — 3331090002 HH PPS REVENUE DEBIT

## 2020-03-25 PROCEDURE — 3331090001 HH PPS REVENUE CREDIT

## 2020-03-26 PROCEDURE — 3331090002 HH PPS REVENUE DEBIT

## 2020-03-26 PROCEDURE — 3331090001 HH PPS REVENUE CREDIT

## 2020-03-27 ENCOUNTER — HOME CARE VISIT (OUTPATIENT)
Dept: SCHEDULING | Facility: HOME HEALTH | Age: 70
End: 2020-03-27
Payer: MEDICARE

## 2020-03-27 PROCEDURE — 3331090002 HH PPS REVENUE DEBIT

## 2020-03-27 PROCEDURE — 3331090001 HH PPS REVENUE CREDIT

## 2020-03-27 PROCEDURE — G0151 HHCP-SERV OF PT,EA 15 MIN: HCPCS

## 2020-03-28 VITALS
TEMPERATURE: 97.2 F | RESPIRATION RATE: 18 BRPM | OXYGEN SATURATION: 97 % | SYSTOLIC BLOOD PRESSURE: 132 MMHG | DIASTOLIC BLOOD PRESSURE: 80 MMHG | HEART RATE: 96 BPM

## 2020-03-28 PROCEDURE — 3331090001 HH PPS REVENUE CREDIT

## 2020-03-28 PROCEDURE — 3331090002 HH PPS REVENUE DEBIT

## 2020-03-29 PROCEDURE — 3331090002 HH PPS REVENUE DEBIT

## 2020-03-29 PROCEDURE — 3331090001 HH PPS REVENUE CREDIT

## 2020-03-30 ENCOUNTER — HOME CARE VISIT (OUTPATIENT)
Dept: SCHEDULING | Facility: HOME HEALTH | Age: 70
End: 2020-03-30
Payer: MEDICARE

## 2020-03-30 VITALS
SYSTOLIC BLOOD PRESSURE: 120 MMHG | RESPIRATION RATE: 19 BRPM | DIASTOLIC BLOOD PRESSURE: 78 MMHG | TEMPERATURE: 97.6 F | HEART RATE: 92 BPM

## 2020-03-30 PROCEDURE — G0157 HHC PT ASSISTANT EA 15: HCPCS

## 2020-03-30 PROCEDURE — 3331090002 HH PPS REVENUE DEBIT

## 2020-03-30 PROCEDURE — 3331090001 HH PPS REVENUE CREDIT

## 2020-03-31 PROCEDURE — 3331090002 HH PPS REVENUE DEBIT

## 2020-03-31 PROCEDURE — 3331090001 HH PPS REVENUE CREDIT

## 2020-04-01 ENCOUNTER — HOME CARE VISIT (OUTPATIENT)
Dept: SCHEDULING | Facility: HOME HEALTH | Age: 70
End: 2020-04-01
Payer: MEDICARE

## 2020-04-01 PROCEDURE — 3331090002 HH PPS REVENUE DEBIT

## 2020-04-01 PROCEDURE — G0151 HHCP-SERV OF PT,EA 15 MIN: HCPCS

## 2020-04-01 PROCEDURE — 3331090001 HH PPS REVENUE CREDIT

## 2020-04-02 ENCOUNTER — HOME CARE VISIT (OUTPATIENT)
Dept: SCHEDULING | Facility: HOME HEALTH | Age: 70
End: 2020-04-02
Payer: MEDICARE

## 2020-04-02 VITALS
DIASTOLIC BLOOD PRESSURE: 80 MMHG | HEART RATE: 88 BPM | RESPIRATION RATE: 19 BRPM | TEMPERATURE: 97.4 F | SYSTOLIC BLOOD PRESSURE: 124 MMHG

## 2020-04-02 VITALS
SYSTOLIC BLOOD PRESSURE: 120 MMHG | TEMPERATURE: 97.7 F | HEART RATE: 92 BPM | RESPIRATION RATE: 18 BRPM | OXYGEN SATURATION: 94 % | DIASTOLIC BLOOD PRESSURE: 80 MMHG

## 2020-04-02 PROCEDURE — 3331090001 HH PPS REVENUE CREDIT

## 2020-04-02 PROCEDURE — 3331090002 HH PPS REVENUE DEBIT

## 2020-04-02 PROCEDURE — G0157 HHC PT ASSISTANT EA 15: HCPCS

## 2020-04-03 PROCEDURE — 3331090002 HH PPS REVENUE DEBIT

## 2020-04-03 PROCEDURE — 3331090001 HH PPS REVENUE CREDIT

## 2020-04-04 PROCEDURE — 3331090001 HH PPS REVENUE CREDIT

## 2020-04-04 PROCEDURE — 3331090002 HH PPS REVENUE DEBIT

## 2020-04-05 PROCEDURE — 3331090002 HH PPS REVENUE DEBIT

## 2020-04-05 PROCEDURE — 3331090001 HH PPS REVENUE CREDIT

## 2020-04-06 ENCOUNTER — HOME CARE VISIT (OUTPATIENT)
Dept: SCHEDULING | Facility: HOME HEALTH | Age: 70
End: 2020-04-06
Payer: MEDICARE

## 2020-04-06 VITALS
DIASTOLIC BLOOD PRESSURE: 84 MMHG | TEMPERATURE: 97.7 F | HEART RATE: 80 BPM | SYSTOLIC BLOOD PRESSURE: 124 MMHG | RESPIRATION RATE: 18 BRPM

## 2020-04-06 PROCEDURE — 3331090001 HH PPS REVENUE CREDIT

## 2020-04-06 PROCEDURE — 3331090002 HH PPS REVENUE DEBIT

## 2020-04-06 PROCEDURE — G0157 HHC PT ASSISTANT EA 15: HCPCS

## 2020-04-07 PROCEDURE — 3331090002 HH PPS REVENUE DEBIT

## 2020-04-07 PROCEDURE — 3331090001 HH PPS REVENUE CREDIT

## 2020-04-08 PROCEDURE — 3331090002 HH PPS REVENUE DEBIT

## 2020-04-08 PROCEDURE — 3331090001 HH PPS REVENUE CREDIT

## 2020-04-09 ENCOUNTER — HOME CARE VISIT (OUTPATIENT)
Dept: SCHEDULING | Facility: HOME HEALTH | Age: 70
End: 2020-04-09
Payer: MEDICARE

## 2020-04-09 VITALS
RESPIRATION RATE: 18 BRPM | SYSTOLIC BLOOD PRESSURE: 118 MMHG | TEMPERATURE: 97.8 F | DIASTOLIC BLOOD PRESSURE: 78 MMHG | HEART RATE: 78 BPM

## 2020-04-09 PROCEDURE — 3331090002 HH PPS REVENUE DEBIT

## 2020-04-09 PROCEDURE — 3331090001 HH PPS REVENUE CREDIT

## 2020-04-09 PROCEDURE — G0151 HHCP-SERV OF PT,EA 15 MIN: HCPCS

## 2020-04-10 PROCEDURE — 3331090001 HH PPS REVENUE CREDIT

## 2020-04-10 PROCEDURE — 3331090002 HH PPS REVENUE DEBIT

## 2020-04-11 PROCEDURE — 3331090001 HH PPS REVENUE CREDIT

## 2020-04-11 PROCEDURE — 3331090002 HH PPS REVENUE DEBIT

## 2020-04-12 PROCEDURE — 3331090002 HH PPS REVENUE DEBIT

## 2020-04-12 PROCEDURE — 3331090001 HH PPS REVENUE CREDIT

## 2020-04-13 ENCOUNTER — HOME CARE VISIT (OUTPATIENT)
Dept: SCHEDULING | Facility: HOME HEALTH | Age: 70
End: 2020-04-13
Payer: MEDICARE

## 2020-04-13 VITALS
DIASTOLIC BLOOD PRESSURE: 82 MMHG | TEMPERATURE: 97.4 F | SYSTOLIC BLOOD PRESSURE: 102 MMHG | RESPIRATION RATE: 19 BRPM | HEART RATE: 80 BPM

## 2020-04-13 PROCEDURE — 3331090002 HH PPS REVENUE DEBIT

## 2020-04-13 PROCEDURE — 3331090001 HH PPS REVENUE CREDIT

## 2020-04-13 PROCEDURE — G0157 HHC PT ASSISTANT EA 15: HCPCS

## 2020-04-14 PROCEDURE — 3331090002 HH PPS REVENUE DEBIT

## 2020-04-14 PROCEDURE — 3331090001 HH PPS REVENUE CREDIT

## 2020-04-15 ENCOUNTER — HOME CARE VISIT (OUTPATIENT)
Dept: SCHEDULING | Facility: HOME HEALTH | Age: 70
End: 2020-04-15
Payer: MEDICARE

## 2020-04-15 VITALS
HEART RATE: 88 BPM | RESPIRATION RATE: 19 BRPM | TEMPERATURE: 97.4 F | SYSTOLIC BLOOD PRESSURE: 130 MMHG | DIASTOLIC BLOOD PRESSURE: 86 MMHG

## 2020-04-15 PROCEDURE — 3331090002 HH PPS REVENUE DEBIT

## 2020-04-15 PROCEDURE — G0157 HHC PT ASSISTANT EA 15: HCPCS

## 2020-04-15 PROCEDURE — 3331090001 HH PPS REVENUE CREDIT

## 2020-04-16 PROCEDURE — 3331090001 HH PPS REVENUE CREDIT

## 2020-04-16 PROCEDURE — 3331090002 HH PPS REVENUE DEBIT

## 2020-04-17 PROCEDURE — 3331090002 HH PPS REVENUE DEBIT

## 2020-04-17 PROCEDURE — 3331090001 HH PPS REVENUE CREDIT

## 2020-04-18 PROCEDURE — 3331090002 HH PPS REVENUE DEBIT

## 2020-04-18 PROCEDURE — 3331090001 HH PPS REVENUE CREDIT

## 2020-04-19 PROCEDURE — 3331090001 HH PPS REVENUE CREDIT

## 2020-04-19 PROCEDURE — A4649 SURGICAL SUPPLIES: HCPCS

## 2020-04-19 PROCEDURE — 3331090002 HH PPS REVENUE DEBIT

## 2020-04-20 ENCOUNTER — HOME CARE VISIT (OUTPATIENT)
Dept: SCHEDULING | Facility: HOME HEALTH | Age: 70
End: 2020-04-20
Payer: MEDICARE

## 2020-04-20 VITALS
SYSTOLIC BLOOD PRESSURE: 130 MMHG | HEART RATE: 80 BPM | DIASTOLIC BLOOD PRESSURE: 88 MMHG | RESPIRATION RATE: 19 BRPM | TEMPERATURE: 97.4 F

## 2020-04-20 PROCEDURE — 3331090001 HH PPS REVENUE CREDIT

## 2020-04-20 PROCEDURE — 400013 HH SOC

## 2020-04-20 PROCEDURE — 3331090002 HH PPS REVENUE DEBIT

## 2020-04-20 PROCEDURE — G0157 HHC PT ASSISTANT EA 15: HCPCS

## 2020-04-21 PROCEDURE — 3331090002 HH PPS REVENUE DEBIT

## 2020-04-21 PROCEDURE — 3331090001 HH PPS REVENUE CREDIT

## 2020-04-22 ENCOUNTER — HOME CARE VISIT (OUTPATIENT)
Dept: SCHEDULING | Facility: HOME HEALTH | Age: 70
End: 2020-04-22
Payer: MEDICARE

## 2020-04-22 VITALS
TEMPERATURE: 97.7 F | SYSTOLIC BLOOD PRESSURE: 122 MMHG | DIASTOLIC BLOOD PRESSURE: 86 MMHG | HEART RATE: 96 BPM | RESPIRATION RATE: 19 BRPM

## 2020-04-22 PROCEDURE — 3331090002 HH PPS REVENUE DEBIT

## 2020-04-22 PROCEDURE — 3331090001 HH PPS REVENUE CREDIT

## 2020-04-22 PROCEDURE — G0157 HHC PT ASSISTANT EA 15: HCPCS

## 2020-04-23 PROCEDURE — 3331090002 HH PPS REVENUE DEBIT

## 2020-04-23 PROCEDURE — 3331090001 HH PPS REVENUE CREDIT

## 2020-04-24 PROCEDURE — 3331090001 HH PPS REVENUE CREDIT

## 2020-04-24 PROCEDURE — 3331090002 HH PPS REVENUE DEBIT

## 2020-04-25 PROCEDURE — 3331090001 HH PPS REVENUE CREDIT

## 2020-04-25 PROCEDURE — 3331090002 HH PPS REVENUE DEBIT

## 2020-04-26 PROCEDURE — 3331090001 HH PPS REVENUE CREDIT

## 2020-04-26 PROCEDURE — 3331090002 HH PPS REVENUE DEBIT

## 2020-04-27 ENCOUNTER — HOME CARE VISIT (OUTPATIENT)
Dept: SCHEDULING | Facility: HOME HEALTH | Age: 70
End: 2020-04-27
Payer: MEDICARE

## 2020-04-27 VITALS
TEMPERATURE: 97.4 F | HEART RATE: 92 BPM | RESPIRATION RATE: 19 BRPM | DIASTOLIC BLOOD PRESSURE: 82 MMHG | SYSTOLIC BLOOD PRESSURE: 112 MMHG

## 2020-04-27 PROCEDURE — 3331090001 HH PPS REVENUE CREDIT

## 2020-04-27 PROCEDURE — G0157 HHC PT ASSISTANT EA 15: HCPCS

## 2020-04-27 PROCEDURE — 3331090002 HH PPS REVENUE DEBIT

## 2020-04-28 PROCEDURE — 3331090001 HH PPS REVENUE CREDIT

## 2020-04-28 PROCEDURE — 3331090002 HH PPS REVENUE DEBIT

## 2020-04-29 ENCOUNTER — HOME CARE VISIT (OUTPATIENT)
Dept: SCHEDULING | Facility: HOME HEALTH | Age: 70
End: 2020-04-29
Payer: MEDICARE

## 2020-04-29 VITALS
DIASTOLIC BLOOD PRESSURE: 78 MMHG | RESPIRATION RATE: 18 BRPM | HEART RATE: 78 BPM | SYSTOLIC BLOOD PRESSURE: 132 MMHG | TEMPERATURE: 97.8 F

## 2020-04-29 PROCEDURE — 3331090001 HH PPS REVENUE CREDIT

## 2020-04-29 PROCEDURE — G0151 HHCP-SERV OF PT,EA 15 MIN: HCPCS

## 2020-04-29 PROCEDURE — 3331090002 HH PPS REVENUE DEBIT

## 2020-06-26 PROCEDURE — A4649 SURGICAL SUPPLIES: HCPCS

## 2021-06-19 ENCOUNTER — HOSPITAL ENCOUNTER (INPATIENT)
Age: 71
LOS: 2 days | Discharge: HOME HEALTH | DRG: 482 | End: 2021-06-21
Attending: EMERGENCY MEDICINE | Admitting: HOSPITALIST
Payer: MEDICARE

## 2021-06-19 ENCOUNTER — ANESTHESIA (OUTPATIENT)
Dept: SURGERY | Age: 71
DRG: 482 | End: 2021-06-19
Payer: MEDICARE

## 2021-06-19 ENCOUNTER — APPOINTMENT (OUTPATIENT)
Dept: GENERAL RADIOLOGY | Age: 71
DRG: 482 | End: 2021-06-19
Attending: ORTHOPAEDIC SURGERY
Payer: MEDICARE

## 2021-06-19 ENCOUNTER — HOSPITAL ENCOUNTER (INPATIENT)
Dept: GENERAL RADIOLOGY | Age: 71
Discharge: HOME | DRG: 482 | End: 2021-06-19
Attending: ORTHOPAEDIC SURGERY
Payer: MEDICARE

## 2021-06-19 ENCOUNTER — APPOINTMENT (OUTPATIENT)
Dept: GENERAL RADIOLOGY | Age: 71
DRG: 482 | End: 2021-06-19
Attending: EMERGENCY MEDICINE
Payer: MEDICARE

## 2021-06-19 ENCOUNTER — ANESTHESIA EVENT (OUTPATIENT)
Dept: SURGERY | Age: 71
DRG: 482 | End: 2021-06-19
Payer: MEDICARE

## 2021-06-19 DIAGNOSIS — S72.001A CLOSED FRACTURE OF NECK OF RIGHT FEMUR, INITIAL ENCOUNTER (HCC): Primary | ICD-10-CM

## 2021-06-19 DIAGNOSIS — M25.551 RIGHT HIP PAIN: ICD-10-CM

## 2021-06-19 PROBLEM — I10 ESSENTIAL HYPERTENSION: Status: ACTIVE | Noted: 2021-06-19

## 2021-06-19 LAB
ABO + RH BLD: NORMAL
ALBUMIN SERPL-MCNC: 3.7 G/DL (ref 3.2–4.6)
ALBUMIN/GLOB SERPL: 1.1 {RATIO} (ref 1.2–3.5)
ALP SERPL-CCNC: 133 U/L (ref 50–136)
ALT SERPL-CCNC: 28 U/L (ref 12–65)
ANION GAP SERPL CALC-SCNC: 5 MMOL/L (ref 7–16)
AST SERPL-CCNC: 22 U/L (ref 15–37)
BASOPHILS # BLD: 0.1 K/UL (ref 0–0.2)
BASOPHILS NFR BLD: 1 % (ref 0–2)
BILIRUB SERPL-MCNC: 0.8 MG/DL (ref 0.2–1.1)
BLOOD GROUP ANTIBODIES SERPL: NORMAL
BUN SERPL-MCNC: 16 MG/DL (ref 8–23)
CALCIUM SERPL-MCNC: 9 MG/DL (ref 8.3–10.4)
CHLORIDE SERPL-SCNC: 111 MMOL/L (ref 98–107)
CO2 SERPL-SCNC: 25 MMOL/L (ref 21–32)
CREAT SERPL-MCNC: 0.58 MG/DL (ref 0.6–1)
DIFFERENTIAL METHOD BLD: NORMAL
EOSINOPHIL # BLD: 0.1 K/UL (ref 0–0.8)
EOSINOPHIL NFR BLD: 1 % (ref 0.5–7.8)
ERYTHROCYTE [DISTWIDTH] IN BLOOD BY AUTOMATED COUNT: 13.8 % (ref 11.9–14.6)
GLOBULIN SER CALC-MCNC: 3.5 G/DL (ref 2.3–3.5)
GLUCOSE SERPL-MCNC: 103 MG/DL (ref 65–100)
HCT VFR BLD AUTO: 44.1 % (ref 35.8–46.3)
HGB BLD-MCNC: 14.3 G/DL (ref 11.7–15.4)
IMM GRANULOCYTES # BLD AUTO: 0 K/UL (ref 0–0.5)
IMM GRANULOCYTES NFR BLD AUTO: 0 % (ref 0–5)
INR PPP: 0.9
LYMPHOCYTES # BLD: 2.1 K/UL (ref 0.5–4.6)
LYMPHOCYTES NFR BLD: 21 % (ref 13–44)
MCH RBC QN AUTO: 29.7 PG (ref 26.1–32.9)
MCHC RBC AUTO-ENTMCNC: 32.4 G/DL (ref 31.4–35)
MCV RBC AUTO: 91.5 FL (ref 79.6–97.8)
MONOCYTES # BLD: 1 K/UL (ref 0.1–1.3)
MONOCYTES NFR BLD: 10 % (ref 4–12)
NEUTS SEG # BLD: 6.8 K/UL (ref 1.7–8.2)
NEUTS SEG NFR BLD: 68 % (ref 43–78)
NRBC # BLD: 0 K/UL (ref 0–0.2)
PLATELET # BLD AUTO: 192 K/UL (ref 150–450)
PMV BLD AUTO: 11.2 FL (ref 9.4–12.3)
POTASSIUM SERPL-SCNC: 3.8 MMOL/L (ref 3.5–5.1)
PROT SERPL-MCNC: 7.2 G/DL (ref 6.3–8.2)
PROTHROMBIN TIME: 12.8 SEC (ref 12.5–14.7)
RBC # BLD AUTO: 4.82 M/UL (ref 4.05–5.2)
SODIUM SERPL-SCNC: 141 MMOL/L (ref 136–145)
SPECIMEN EXP DATE BLD: NORMAL
WBC # BLD AUTO: 10 K/UL (ref 4.3–11.1)

## 2021-06-19 PROCEDURE — 85025 COMPLETE CBC W/AUTO DIFF WBC: CPT

## 2021-06-19 PROCEDURE — 73502 X-RAY EXAM HIP UNI 2-3 VIEWS: CPT

## 2021-06-19 PROCEDURE — 74011250636 HC RX REV CODE- 250/636: Performed by: HOSPITALIST

## 2021-06-19 PROCEDURE — 73552 X-RAY EXAM OF FEMUR 2/>: CPT

## 2021-06-19 PROCEDURE — 77030002982 HC SUT POLYSRB J&J -A: Performed by: ORTHOPAEDIC SURGERY

## 2021-06-19 PROCEDURE — 27235 TREAT THIGH FRACTURE: CPT | Performed by: ORTHOPAEDIC SURGERY

## 2021-06-19 PROCEDURE — 2709999900 HC NON-CHARGEABLE SUPPLY: Performed by: ORTHOPAEDIC SURGERY

## 2021-06-19 PROCEDURE — 74011250637 HC RX REV CODE- 250/637: Performed by: HOSPITALIST

## 2021-06-19 PROCEDURE — 77030039425 HC BLD LARYNG TRULITE DISP TELE -A: Performed by: ANESTHESIOLOGY

## 2021-06-19 PROCEDURE — 76060000033 HC ANESTHESIA 1 TO 1.5 HR: Performed by: ORTHOPAEDIC SURGERY

## 2021-06-19 PROCEDURE — 74011000250 HC RX REV CODE- 250: Performed by: ORTHOPAEDIC SURGERY

## 2021-06-19 PROCEDURE — 76210000006 HC OR PH I REC 0.5 TO 1 HR: Performed by: ORTHOPAEDIC SURGERY

## 2021-06-19 PROCEDURE — 74011250636 HC RX REV CODE- 250/636: Performed by: ORTHOPAEDIC SURGERY

## 2021-06-19 PROCEDURE — C1713 ANCHOR/SCREW BN/BN,TIS/BN: HCPCS | Performed by: ORTHOPAEDIC SURGERY

## 2021-06-19 PROCEDURE — 99284 EMERGENCY DEPT VISIT MOD MDM: CPT

## 2021-06-19 PROCEDURE — 74011250636 HC RX REV CODE- 250/636: Performed by: ANESTHESIOLOGY

## 2021-06-19 PROCEDURE — 77030037088 HC TUBE ENDOTRACH ORAL NSL COVD-A: Performed by: ANESTHESIOLOGY

## 2021-06-19 PROCEDURE — 36415 COLL VENOUS BLD VENIPUNCTURE: CPT

## 2021-06-19 PROCEDURE — 72170 X-RAY EXAM OF PELVIS: CPT

## 2021-06-19 PROCEDURE — 77030027138 HC INCENT SPIROMETER -A

## 2021-06-19 PROCEDURE — 74011250637 HC RX REV CODE- 250/637: Performed by: ORTHOPAEDIC SURGERY

## 2021-06-19 PROCEDURE — 65270000029 HC RM PRIVATE

## 2021-06-19 PROCEDURE — 99232 SBSQ HOSP IP/OBS MODERATE 35: CPT | Performed by: ORTHOPAEDIC SURGERY

## 2021-06-19 PROCEDURE — 77030017016 HC DSG ANTIMIC BARR2 S&N -B: Performed by: ORTHOPAEDIC SURGERY

## 2021-06-19 PROCEDURE — 85610 PROTHROMBIN TIME: CPT

## 2021-06-19 PROCEDURE — 76010000161 HC OR TIME 1 TO 1.5 HR INTENSV-TIER 1: Performed by: ORTHOPAEDIC SURGERY

## 2021-06-19 PROCEDURE — 93005 ELECTROCARDIOGRAM TRACING: CPT | Performed by: EMERGENCY MEDICINE

## 2021-06-19 PROCEDURE — 73501 X-RAY EXAM HIP UNI 1 VIEW: CPT

## 2021-06-19 PROCEDURE — 86900 BLOOD TYPING SEROLOGIC ABO: CPT

## 2021-06-19 PROCEDURE — 0QS634Z REPOSITION RIGHT UPPER FEMUR WITH INTERNAL FIXATION DEVICE, PERCUTANEOUS APPROACH: ICD-10-PCS | Performed by: ORTHOPAEDIC SURGERY

## 2021-06-19 PROCEDURE — 2709999900 HC NON-CHARGEABLE SUPPLY

## 2021-06-19 PROCEDURE — 74011250636 HC RX REV CODE- 250/636: Performed by: EMERGENCY MEDICINE

## 2021-06-19 PROCEDURE — 71045 X-RAY EXAM CHEST 1 VIEW: CPT

## 2021-06-19 PROCEDURE — 96374 THER/PROPH/DIAG INJ IV PUSH: CPT

## 2021-06-19 PROCEDURE — 77030016458 HC BIT DRL CANN1 SYNT -F: Performed by: ORTHOPAEDIC SURGERY

## 2021-06-19 PROCEDURE — 51702 INSERT TEMP BLADDER CATH: CPT

## 2021-06-19 PROCEDURE — 80053 COMPREHEN METABOLIC PANEL: CPT

## 2021-06-19 PROCEDURE — 77030008462 HC STPLR SKN PROX J&J -A: Performed by: ORTHOPAEDIC SURGERY

## 2021-06-19 PROCEDURE — 74011250637 HC RX REV CODE- 250/637: Performed by: EMERGENCY MEDICINE

## 2021-06-19 PROCEDURE — 74011000250 HC RX REV CODE- 250: Performed by: ANESTHESIOLOGY

## 2021-06-19 PROCEDURE — 74011250637 HC RX REV CODE- 250/637: Performed by: ANESTHESIOLOGY

## 2021-06-19 PROCEDURE — 77030036696 HC BOOT TRACT BUCKS S2SG -A

## 2021-06-19 PROCEDURE — 96375 TX/PRO/DX INJ NEW DRUG ADDON: CPT

## 2021-06-19 DEVICE — SCREW BNE L90MM DIA7.3MM THRD L16MM TI CANN PARTIALLY: Type: IMPLANTABLE DEVICE | Site: HIP | Status: FUNCTIONAL

## 2021-06-19 DEVICE — SCREW BNE L85MM DIA7.3MM THRD L16MM CORT TI ST SELF DRL: Type: IMPLANTABLE DEVICE | Site: HIP | Status: FUNCTIONAL

## 2021-06-19 RX ORDER — OXYCODONE HYDROCHLORIDE 5 MG/1
5 TABLET ORAL
Status: COMPLETED | OUTPATIENT
Start: 2021-06-19 | End: 2021-06-19

## 2021-06-19 RX ORDER — AMOXICILLIN 250 MG
2 CAPSULE ORAL DAILY
Status: DISCONTINUED | OUTPATIENT
Start: 2021-06-20 | End: 2021-06-21 | Stop reason: HOSPADM

## 2021-06-19 RX ORDER — OXYCODONE HYDROCHLORIDE 5 MG/1
5 TABLET ORAL
Status: DISCONTINUED | OUTPATIENT
Start: 2021-06-19 | End: 2021-06-19 | Stop reason: SDUPTHER

## 2021-06-19 RX ORDER — ASPIRIN 325 MG
325 TABLET, DELAYED RELEASE (ENTERIC COATED) ORAL DAILY
Status: DISCONTINUED | OUTPATIENT
Start: 2021-06-20 | End: 2021-06-21 | Stop reason: HOSPADM

## 2021-06-19 RX ORDER — DEXAMETHASONE SODIUM PHOSPHATE 100 MG/10ML
10 INJECTION INTRAMUSCULAR; INTRAVENOUS ONCE
Status: COMPLETED | OUTPATIENT
Start: 2021-06-20 | End: 2021-06-20

## 2021-06-19 RX ORDER — ACETAMINOPHEN 650 MG/1
650 SUPPOSITORY RECTAL ONCE
Status: COMPLETED | OUTPATIENT
Start: 2021-06-19 | End: 2021-06-19

## 2021-06-19 RX ORDER — ONDANSETRON 4 MG/1
4 TABLET, ORALLY DISINTEGRATING ORAL
Status: COMPLETED | OUTPATIENT
Start: 2021-06-19 | End: 2021-06-19

## 2021-06-19 RX ORDER — ONDANSETRON 2 MG/ML
INJECTION INTRAMUSCULAR; INTRAVENOUS AS NEEDED
Status: DISCONTINUED | OUTPATIENT
Start: 2021-06-19 | End: 2021-06-19 | Stop reason: HOSPADM

## 2021-06-19 RX ORDER — HYDROMORPHONE HYDROCHLORIDE 1 MG/ML
1 INJECTION, SOLUTION INTRAMUSCULAR; INTRAVENOUS; SUBCUTANEOUS
Status: DISCONTINUED | OUTPATIENT
Start: 2021-06-19 | End: 2021-06-21 | Stop reason: HOSPADM

## 2021-06-19 RX ORDER — SODIUM CHLORIDE 0.9 % (FLUSH) 0.9 %
5-40 SYRINGE (ML) INJECTION AS NEEDED
Status: DISCONTINUED | OUTPATIENT
Start: 2021-06-19 | End: 2021-06-19 | Stop reason: SDUPTHER

## 2021-06-19 RX ORDER — DIPHENHYDRAMINE HYDROCHLORIDE 50 MG/ML
12.5 INJECTION, SOLUTION INTRAMUSCULAR; INTRAVENOUS
Status: DISCONTINUED | OUTPATIENT
Start: 2021-06-19 | End: 2021-06-19 | Stop reason: SDUPTHER

## 2021-06-19 RX ORDER — NALOXONE HYDROCHLORIDE 0.4 MG/ML
0.4 INJECTION, SOLUTION INTRAMUSCULAR; INTRAVENOUS; SUBCUTANEOUS AS NEEDED
Status: DISCONTINUED | OUTPATIENT
Start: 2021-06-19 | End: 2021-06-19 | Stop reason: SDUPTHER

## 2021-06-19 RX ORDER — CEFAZOLIN SODIUM/WATER 2 G/20 ML
SYRINGE (ML) INTRAVENOUS AS NEEDED
Status: DISCONTINUED | OUTPATIENT
Start: 2021-06-19 | End: 2021-06-19 | Stop reason: HOSPADM

## 2021-06-19 RX ORDER — CARISOPRODOL 350 MG/1
350 TABLET ORAL
Status: CANCELLED | OUTPATIENT
Start: 2021-06-19

## 2021-06-19 RX ORDER — CEFAZOLIN SODIUM/WATER 2 G/20 ML
2 SYRINGE (ML) INTRAVENOUS
Status: DISCONTINUED | OUTPATIENT
Start: 2021-06-19 | End: 2021-06-19 | Stop reason: HOSPADM

## 2021-06-19 RX ORDER — SODIUM CHLORIDE 0.9 % (FLUSH) 0.9 %
5-40 SYRINGE (ML) INJECTION EVERY 8 HOURS
Status: DISCONTINUED | OUTPATIENT
Start: 2021-06-19 | End: 2021-06-19 | Stop reason: SDUPTHER

## 2021-06-19 RX ORDER — SODIUM CHLORIDE 0.9 % (FLUSH) 0.9 %
5-40 SYRINGE (ML) INJECTION EVERY 8 HOURS
Status: DISCONTINUED | OUTPATIENT
Start: 2021-06-19 | End: 2021-06-19 | Stop reason: HOSPADM

## 2021-06-19 RX ORDER — BUTALBITAL, ACETAMINOPHEN AND CAFFEINE 50; 325; 40 MG/1; MG/1; MG/1
1 TABLET ORAL
Status: DISCONTINUED | OUTPATIENT
Start: 2021-06-19 | End: 2021-06-21 | Stop reason: HOSPADM

## 2021-06-19 RX ORDER — MAGNESIUM SULFATE HEPTAHYDRATE 40 MG/ML
2 INJECTION, SOLUTION INTRAVENOUS AS NEEDED
Status: DISCONTINUED | OUTPATIENT
Start: 2021-06-19 | End: 2021-06-21 | Stop reason: HOSPADM

## 2021-06-19 RX ORDER — ACETAMINOPHEN 650 MG/1
650 SUPPOSITORY RECTAL
Status: DISCONTINUED | OUTPATIENT
Start: 2021-06-19 | End: 2021-06-19

## 2021-06-19 RX ORDER — DIPHENHYDRAMINE HCL 25 MG
25 CAPSULE ORAL
Status: DISCONTINUED | OUTPATIENT
Start: 2021-06-19 | End: 2021-06-21 | Stop reason: HOSPADM

## 2021-06-19 RX ORDER — FAMOTIDINE 20 MG/1
20 TABLET, FILM COATED ORAL 2 TIMES DAILY
Status: DISCONTINUED | OUTPATIENT
Start: 2021-06-19 | End: 2021-06-21 | Stop reason: HOSPADM

## 2021-06-19 RX ORDER — SODIUM CHLORIDE 9 MG/ML
100 INJECTION, SOLUTION INTRAVENOUS CONTINUOUS
Status: DISCONTINUED | OUTPATIENT
Start: 2021-06-19 | End: 2021-06-20

## 2021-06-19 RX ORDER — BUPROPION HYDROCHLORIDE 150 MG/1
150 TABLET, EXTENDED RELEASE ORAL 2 TIMES DAILY
Status: DISCONTINUED | OUTPATIENT
Start: 2021-06-19 | End: 2021-06-21 | Stop reason: HOSPADM

## 2021-06-19 RX ORDER — ENOXAPARIN SODIUM 100 MG/ML
40 INJECTION SUBCUTANEOUS EVERY 24 HOURS
Status: DISCONTINUED | OUTPATIENT
Start: 2021-06-19 | End: 2021-06-21

## 2021-06-19 RX ORDER — NALOXONE HYDROCHLORIDE 0.4 MG/ML
0.1 INJECTION, SOLUTION INTRAMUSCULAR; INTRAVENOUS; SUBCUTANEOUS AS NEEDED
Status: DISCONTINUED | OUTPATIENT
Start: 2021-06-19 | End: 2021-06-19 | Stop reason: HOSPADM

## 2021-06-19 RX ORDER — HYDROMORPHONE HYDROCHLORIDE 1 MG/ML
0.5 INJECTION, SOLUTION INTRAMUSCULAR; INTRAVENOUS; SUBCUTANEOUS
Status: DISCONTINUED | OUTPATIENT
Start: 2021-06-19 | End: 2021-06-19 | Stop reason: HOSPADM

## 2021-06-19 RX ORDER — CEFAZOLIN SODIUM/WATER 2 G/20 ML
2 SYRINGE (ML) INTRAVENOUS EVERY 8 HOURS
Status: COMPLETED | OUTPATIENT
Start: 2021-06-19 | End: 2021-06-20

## 2021-06-19 RX ORDER — LIDOCAINE HYDROCHLORIDE 20 MG/ML
INJECTION, SOLUTION EPIDURAL; INFILTRATION; INTRACAUDAL; PERINEURAL AS NEEDED
Status: DISCONTINUED | OUTPATIENT
Start: 2021-06-19 | End: 2021-06-19 | Stop reason: HOSPADM

## 2021-06-19 RX ORDER — POTASSIUM CHLORIDE 7.45 MG/ML
10 INJECTION INTRAVENOUS AS NEEDED
Status: DISCONTINUED | OUTPATIENT
Start: 2021-06-19 | End: 2021-06-21 | Stop reason: HOSPADM

## 2021-06-19 RX ORDER — SUCCINYLCHOLINE CHLORIDE 20 MG/ML
INJECTION INTRAMUSCULAR; INTRAVENOUS AS NEEDED
Status: DISCONTINUED | OUTPATIENT
Start: 2021-06-19 | End: 2021-06-19 | Stop reason: HOSPADM

## 2021-06-19 RX ORDER — OXYCODONE HYDROCHLORIDE 5 MG/1
5 TABLET ORAL
Status: DISCONTINUED | OUTPATIENT
Start: 2021-06-19 | End: 2021-06-21 | Stop reason: HOSPADM

## 2021-06-19 RX ORDER — ONDANSETRON 4 MG/1
4 TABLET, ORALLY DISINTEGRATING ORAL
Status: DISCONTINUED | OUTPATIENT
Start: 2021-06-19 | End: 2021-06-21 | Stop reason: HOSPADM

## 2021-06-19 RX ORDER — NALOXONE HYDROCHLORIDE 0.4 MG/ML
.2-.4 INJECTION, SOLUTION INTRAMUSCULAR; INTRAVENOUS; SUBCUTANEOUS
Status: DISCONTINUED | OUTPATIENT
Start: 2021-06-19 | End: 2021-06-21 | Stop reason: HOSPADM

## 2021-06-19 RX ORDER — ONDANSETRON 2 MG/ML
4 INJECTION INTRAMUSCULAR; INTRAVENOUS
Status: DISCONTINUED | OUTPATIENT
Start: 2021-06-19 | End: 2021-06-21 | Stop reason: HOSPADM

## 2021-06-19 RX ORDER — ACETAMINOPHEN 325 MG/1
650 TABLET ORAL
Status: DISCONTINUED | OUTPATIENT
Start: 2021-06-19 | End: 2021-06-21 | Stop reason: HOSPADM

## 2021-06-19 RX ORDER — MORPHINE SULFATE 4 MG/ML
3 INJECTION INTRAVENOUS
Status: DISCONTINUED | OUTPATIENT
Start: 2021-06-19 | End: 2021-06-19

## 2021-06-19 RX ORDER — SODIUM CHLORIDE, SODIUM LACTATE, POTASSIUM CHLORIDE, CALCIUM CHLORIDE 600; 310; 30; 20 MG/100ML; MG/100ML; MG/100ML; MG/100ML
25 INJECTION, SOLUTION INTRAVENOUS CONTINUOUS
Status: DISCONTINUED | OUTPATIENT
Start: 2021-06-19 | End: 2021-06-19 | Stop reason: HOSPADM

## 2021-06-19 RX ORDER — ACETAMINOPHEN 500 MG
1000 TABLET ORAL EVERY 6 HOURS
Status: DISCONTINUED | OUTPATIENT
Start: 2021-06-20 | End: 2021-06-21 | Stop reason: HOSPADM

## 2021-06-19 RX ORDER — KETOROLAC TROMETHAMINE 15 MG/ML
15 INJECTION, SOLUTION INTRAMUSCULAR; INTRAVENOUS
Status: DISCONTINUED | OUTPATIENT
Start: 2021-06-19 | End: 2021-06-19 | Stop reason: SDUPTHER

## 2021-06-19 RX ORDER — HYDROCODONE BITARTRATE AND ACETAMINOPHEN 5; 325 MG/1; MG/1
1 TABLET ORAL ONCE
Status: COMPLETED | OUTPATIENT
Start: 2021-06-19 | End: 2021-06-19

## 2021-06-19 RX ORDER — HYDROMORPHONE HYDROCHLORIDE 1 MG/ML
1 INJECTION, SOLUTION INTRAMUSCULAR; INTRAVENOUS; SUBCUTANEOUS
Status: DISCONTINUED | OUTPATIENT
Start: 2021-06-19 | End: 2021-06-19 | Stop reason: SDUPTHER

## 2021-06-19 RX ORDER — LISINOPRIL 5 MG/1
10 TABLET ORAL
Status: DISCONTINUED | OUTPATIENT
Start: 2021-06-19 | End: 2021-06-20

## 2021-06-19 RX ORDER — AMOXICILLIN 250 MG
1 CAPSULE ORAL DAILY
Status: DISCONTINUED | OUTPATIENT
Start: 2021-06-20 | End: 2021-06-19 | Stop reason: SDUPTHER

## 2021-06-19 RX ORDER — FENTANYL CITRATE 50 UG/ML
INJECTION, SOLUTION INTRAMUSCULAR; INTRAVENOUS AS NEEDED
Status: DISCONTINUED | OUTPATIENT
Start: 2021-06-19 | End: 2021-06-19 | Stop reason: HOSPADM

## 2021-06-19 RX ORDER — ROCURONIUM BROMIDE 10 MG/ML
INJECTION, SOLUTION INTRAVENOUS AS NEEDED
Status: DISCONTINUED | OUTPATIENT
Start: 2021-06-19 | End: 2021-06-19 | Stop reason: HOSPADM

## 2021-06-19 RX ORDER — CALCIUM CARBONATE 500(1250)
500 TABLET ORAL
Status: DISCONTINUED | OUTPATIENT
Start: 2021-06-19 | End: 2021-06-21 | Stop reason: HOSPADM

## 2021-06-19 RX ORDER — ONDANSETRON 2 MG/ML
4 INJECTION INTRAMUSCULAR; INTRAVENOUS
Status: COMPLETED | OUTPATIENT
Start: 2021-06-19 | End: 2021-06-19

## 2021-06-19 RX ORDER — HYDROCODONE BITARTRATE AND ACETAMINOPHEN 7.5; 325 MG/1; MG/1
1 TABLET ORAL AS NEEDED
Status: DISCONTINUED | OUTPATIENT
Start: 2021-06-19 | End: 2021-06-19 | Stop reason: HOSPADM

## 2021-06-19 RX ORDER — ACETAMINOPHEN 325 MG/1
975 TABLET ORAL ONCE
Status: COMPLETED | OUTPATIENT
Start: 2021-06-19 | End: 2021-06-19

## 2021-06-19 RX ORDER — SODIUM CHLORIDE 0.9 % (FLUSH) 0.9 %
5-40 SYRINGE (ML) INJECTION EVERY 8 HOURS
Status: DISCONTINUED | OUTPATIENT
Start: 2021-06-19 | End: 2021-06-21 | Stop reason: HOSPADM

## 2021-06-19 RX ORDER — PROPOFOL 10 MG/ML
INJECTION, EMULSION INTRAVENOUS AS NEEDED
Status: DISCONTINUED | OUTPATIENT
Start: 2021-06-19 | End: 2021-06-19 | Stop reason: HOSPADM

## 2021-06-19 RX ORDER — MORPHINE SULFATE 4 MG/ML
4 INJECTION INTRAVENOUS ONCE
Status: COMPLETED | OUTPATIENT
Start: 2021-06-19 | End: 2021-06-19

## 2021-06-19 RX ORDER — POLYETHYLENE GLYCOL 3350 17 G/17G
17 POWDER, FOR SOLUTION ORAL DAILY PRN
Status: DISCONTINUED | OUTPATIENT
Start: 2021-06-19 | End: 2021-06-21 | Stop reason: HOSPADM

## 2021-06-19 RX ORDER — CYCLOBENZAPRINE HCL 10 MG
5 TABLET ORAL 3 TIMES DAILY
Status: DISCONTINUED | OUTPATIENT
Start: 2021-06-19 | End: 2021-06-21 | Stop reason: HOSPADM

## 2021-06-19 RX ORDER — SODIUM CHLORIDE 0.9 % (FLUSH) 0.9 %
5-40 SYRINGE (ML) INJECTION AS NEEDED
Status: DISCONTINUED | OUTPATIENT
Start: 2021-06-19 | End: 2021-06-19 | Stop reason: HOSPADM

## 2021-06-19 RX ORDER — HYDRALAZINE HYDROCHLORIDE 20 MG/ML
10 INJECTION INTRAMUSCULAR; INTRAVENOUS
Status: DISCONTINUED | OUTPATIENT
Start: 2021-06-19 | End: 2021-06-21 | Stop reason: HOSPADM

## 2021-06-19 RX ORDER — SODIUM CHLORIDE 0.9 % (FLUSH) 0.9 %
5-40 SYRINGE (ML) INJECTION AS NEEDED
Status: DISCONTINUED | OUTPATIENT
Start: 2021-06-19 | End: 2021-06-21 | Stop reason: HOSPADM

## 2021-06-19 RX ORDER — DEXAMETHASONE SODIUM PHOSPHATE 4 MG/ML
INJECTION, SOLUTION INTRA-ARTICULAR; INTRALESIONAL; INTRAMUSCULAR; INTRAVENOUS; SOFT TISSUE AS NEEDED
Status: DISCONTINUED | OUTPATIENT
Start: 2021-06-19 | End: 2021-06-19 | Stop reason: HOSPADM

## 2021-06-19 RX ADMIN — LIDOCAINE HYDROCHLORIDE 100 MG: 20 INJECTION, SOLUTION EPIDURAL; INFILTRATION; INTRACAUDAL; PERINEURAL at 15:38

## 2021-06-19 RX ADMIN — CEFAZOLIN SODIUM 2 G: 100 INJECTION, POWDER, LYOPHILIZED, FOR SOLUTION INTRAVENOUS at 22:19

## 2021-06-19 RX ADMIN — HYDROMORPHONE HYDROCHLORIDE 0.5 MG: 1 INJECTION, SOLUTION INTRAMUSCULAR; INTRAVENOUS; SUBCUTANEOUS at 16:44

## 2021-06-19 RX ADMIN — Medication 10 ML: at 18:24

## 2021-06-19 RX ADMIN — ONDANSETRON 4 MG: 2 INJECTION INTRAMUSCULAR; INTRAVENOUS at 16:13

## 2021-06-19 RX ADMIN — FAMOTIDINE 20 MG: 20 TABLET ORAL at 18:23

## 2021-06-19 RX ADMIN — CYCLOBENZAPRINE 5 MG: 10 TABLET, FILM COATED ORAL at 21:32

## 2021-06-19 RX ADMIN — ROCURONIUM BROMIDE 5 MG: 10 INJECTION, SOLUTION INTRAVENOUS at 15:38

## 2021-06-19 RX ADMIN — ONDANSETRON 4 MG: 2 INJECTION INTRAMUSCULAR; INTRAVENOUS at 11:43

## 2021-06-19 RX ADMIN — FENTANYL CITRATE 100 MCG: 50 INJECTION INTRAMUSCULAR; INTRAVENOUS at 15:38

## 2021-06-19 RX ADMIN — SUCCINYLCHOLINE CHLORIDE 140 MG: 20 INJECTION, SOLUTION INTRAMUSCULAR; INTRAVENOUS at 15:38

## 2021-06-19 RX ADMIN — Medication 10 ML: at 21:45

## 2021-06-19 RX ADMIN — ACETAMINOPHEN 975 MG: 325 TABLET ORAL at 18:22

## 2021-06-19 RX ADMIN — Medication 1 AMPULE: at 21:32

## 2021-06-19 RX ADMIN — Medication 10 ML: at 15:04

## 2021-06-19 RX ADMIN — ONDANSETRON 4 MG: 4 TABLET, ORALLY DISINTEGRATING ORAL at 10:14

## 2021-06-19 RX ADMIN — HYDROMORPHONE HYDROCHLORIDE 1 MG: 1 INJECTION, SOLUTION INTRAMUSCULAR; INTRAVENOUS; SUBCUTANEOUS at 13:03

## 2021-06-19 RX ADMIN — SODIUM CHLORIDE 100 ML/HR: 900 INJECTION, SOLUTION INTRAVENOUS at 18:26

## 2021-06-19 RX ADMIN — SODIUM CHLORIDE, SODIUM LACTATE, POTASSIUM CHLORIDE, AND CALCIUM CHLORIDE 25 ML/HR: 600; 310; 30; 20 INJECTION, SOLUTION INTRAVENOUS at 15:14

## 2021-06-19 RX ADMIN — PROPOFOL 150 MG: 10 INJECTION, EMULSION INTRAVENOUS at 15:38

## 2021-06-19 RX ADMIN — DEXAMETHASONE SODIUM PHOSPHATE 4 MG: 4 INJECTION, SOLUTION INTRAMUSCULAR; INTRAVENOUS at 16:14

## 2021-06-19 RX ADMIN — CEFAZOLIN 2 G: 1 INJECTION, POWDER, FOR SOLUTION INTRAVENOUS at 15:31

## 2021-06-19 RX ADMIN — ONDANSETRON 4 MG: 2 INJECTION INTRAMUSCULAR; INTRAVENOUS at 15:01

## 2021-06-19 RX ADMIN — PHENYLEPHRINE HYDROCHLORIDE 100 MCG: 10 INJECTION INTRAVENOUS at 15:53

## 2021-06-19 RX ADMIN — OXYCODONE 5 MG: 5 TABLET ORAL at 16:45

## 2021-06-19 RX ADMIN — MORPHINE SULFATE 4 MG: 4 INJECTION INTRAVENOUS at 11:43

## 2021-06-19 RX ADMIN — ACETAMINOPHEN 1000 MG: 500 TABLET ORAL at 23:28

## 2021-06-19 RX ADMIN — HYDROCODONE BITARTRATE AND ACETAMINOPHEN 1 TABLET: 5; 325 TABLET ORAL at 10:14

## 2021-06-19 RX ADMIN — LISINOPRIL 10 MG: 5 TABLET ORAL at 21:32

## 2021-06-19 NOTE — ANESTHESIA POSTPROCEDURE EVALUATION
Procedure(s):  HIP PERCUTANEOUS PINNING CANNULATED SCREWS RIGHT.     general    Anesthesia Post Evaluation      Multimodal analgesia: multimodal analgesia used between 6 hours prior to anesthesia start to PACU discharge  Patient location during evaluation: PACU  Patient participation: complete - patient participated  Level of consciousness: awake and alert  Pain management: adequate  Airway patency: patent  Anesthetic complications: no  Cardiovascular status: acceptable  Respiratory status: acceptable  Hydration status: acceptable  Post anesthesia nausea and vomiting:  none  Final Post Anesthesia Temperature Assessment:  Normothermia (36.0-37.5 degrees C)      INITIAL Post-op Vital signs:   Vitals Value Taken Time   /90 06/19/21 1713   Temp 36.4 °C (97.5 °F) 06/19/21 1708   Pulse 78 06/19/21 1713   Resp 15 06/19/21 1713   SpO2 94 % 06/19/21 1713

## 2021-06-19 NOTE — BRIEF OP NOTE
Brief Postoperative Note    Patient: Dominique Castro  YOB: 1950  MRN: [id number]    Date of Procedure: 6/19/2021     Pre-Op Diagnosis: Right femoral neck fracture    Post-Op Diagnosis: Same as preoperative diagnosis. Procedure(s):  HIP PERCUTANEOUS PINNING CANNULATED SCREWS RIGHT    Surgeon(s):  Jessica Suh MD    Surgical Assistant: None    Anesthesia: General     Estimated Blood Loss (mL): Minimal    Complications: None    Specimens: * No specimens in log *     Implants:   Implant Name Type Inv. Item Serial No.  Lot No. LRB No. Used Action   SCREW BNE L85MM DIA7. 3MM THRD L16MM STEPHANIE TI ST SELF DRL - [id number]  SCREW BNE L85MM DIA7. 3MM THRD L16MM STEPHANIE TI ST SELF DRL  [id number] Right 2 Implanted   SCREW BNE L90MM DIA7. 3MM THRD L16MM TI LEONARDO PARTIALLY - [id number]  SCREW BNE L90MM DIA7. 3MM THRD L16MM TI LEONARDO PARTIALLY  [employer] [id number] Right 1 Implanted       Drains: * No LDAs found *    Findings: Closed nondisplaced subcapital impacted right femoral neck fracture    Electronically Signed by Harjinder Dove MD on 6/19/2021 at 4:39 PM

## 2021-06-19 NOTE — PROGRESS NOTES
06/19/21 1416   Dual Skin Pressure Injury Assessment   Dual Skin Pressure Injury Assessment WDL   Second Care Provider (Based on [address] [place] [address]) [place] DAY

## 2021-06-19 NOTE — CONSULTS
[id number] [facility]  Consultation Note    Patient ID:  Name: Ale Zhao  MRN: [id number]  AGE: 72 y.o.  : 1950    Date of Consultation:  2021  Referring Physician: Hospitalist    Subjective: Pt complains of right hip pain. .  They presented to the [address] [place] [address] Emergency Dept on this morning. They were found to have a subcapital right hip fracture, nondisplaced, impacted. They were admitted by the hospitalist. They localizes their pain to the right hip. They have pain with rotation or ambulation. Patient works full-time as a . Previous hip pain. That is post right total knee arthroplasty by Dr. Diane Gonzales 4 years ago. they have no other orthopedic concerns at this time.       Past Medical History Includes:   Past Medical History:   Diagnosis Date    Adverse effect of anesthesia     \"hard to knock me out sometimes\"    Arthritis     Claustrophobia     mild    Former cigarette smoker     GERD (gastroesophageal reflux disease)     occassionally-managed with PRN OTC meds    History of melanoma     X2    Hypertension     managed with medication    Ureteral stone    ,   Past Surgical History:   Procedure Laterality Date    HX CATARACT REMOVAL Bilateral 2018,2019    HX LITHOTRIPSY  2020    HX OTHER SURGICAL      melanoma removed from RLE     HX TUBAL LIGATION      2019-\"40 yrs ago\"     Family History:   Family History   Problem Relation Age of Onset    Cancer Mother         Bone CA    Heart Disease Mother     Heart Disease Father       Social History:   Social History     Tobacco Use    Smoking status: Former Smoker     Packs/day: 0.25     Years: 20.00     Pack years: 5.00     Quit date: 2019     Years since quittin.0    Smokeless tobacco: Never Used   Substance Use Topics    Alcohol use: Yes     Comment: rarely       ALLERGIES:   Allergies   Allergen Reactions    Codeine Nausea and Vomiting     Increased heart rate        Patient Medications    Current Facility-Administered Medications   Medication Dose Route Frequency    buPROPion SR (WELLBUTRIN SR) tablet 150 mg  150 mg Oral BID    lisinopriL (PRINIVIL, ZESTRIL) tablet 10 mg  10 mg Oral QHS    sodium chloride (NS) flush 5-40 mL  5-40 mL IntraVENous Q8H    sodium chloride (NS) flush 5-40 mL  5-40 mL IntraVENous PRN    acetaminophen (TYLENOL) tablet 650 mg  650 mg Oral Q6H PRN    polyethylene glycol (MIRALAX) packet 17 g  17 g Oral DAILY PRN    ondansetron (ZOFRAN ODT) tablet 4 mg  4 mg Oral Q8H PRN    Or    ondansetron (ZOFRAN) injection 4 mg  4 mg IntraVENous Q6H PRN    enoxaparin (LOVENOX) injection 40 mg  40 mg SubCUTAneous Q24H    potassium chloride 10 mEq in 100 ml IVPB  10 mEq IntraVENous PRN    magnesium sulfate 2 g/50 ml IVPB (premix or compounded)  2 g IntraVENous PRN    famotidine (PEPCID) tablet 20 mg  20 mg Oral BID    oxyCODONE IR (ROXICODONE) tablet 5 mg  5 mg Oral Q4H PRN    [START ON 6/20/2021] senna-docusate (PERICOLACE) 8.6-50 mg per tablet 1 Tablet  1 Tablet Oral DAILY    naloxone (NARCAN) injection 0.4 mg  0.4 mg IntraVENous PRN    ketorolac (TORADOL) injection 15 mg  15 mg IntraVENous Q8H PRN    HYDROmorphone (DILAUDID) injection 1 mg  1 mg IntraVENous Q4H PRN    calcium carbonate (OS-ABHI) tablet 500 mg [elemental]  500 mg Oral TID WITH MEALS    cyclobenzaprine (FLEXERIL) tablet 5 mg  5 mg Oral TID    hydrALAZINE (APRESOLINE) 20 mg/mL injection 10 mg  10 mg IntraVENous Q6H PRN    diphenhydrAMINE (BENADRYL) injection 12.5 mg  12.5 mg IntraVENous Q4H PRN    butalbital-acetaminophen-caffeine (FIORICET, ESGIC) -40 mg per tablet 1 Tablet  1 Tablet Oral Q6H PRN         Review of Systems:  A comprehensive review of systems was negative except for that written in the HPI.     Physical Exam:      General: NAD, Alert, Oriented x 3   Mental Status: Appropriate   Psych: Normal Affect, Normal Mood    HEENT: Normal Cephalic/Atraumatic, PERRL   Lungs: Respirations even and unlabored, Breath Sounds were clear, no respiratory distress   Heart: Regular Rate and Rhythm   Vascular: Distal pulses intact, good capillary refill   Skin: No redness, No Rashes, Skin is dry   Musculoskeletal: Minimal bruising around the right lateral hip. Slight tenderness over the groin. No leg length discrepancy. No gross neurologic deficits. Pulses are adequate. Lymphatic: No lympahdenopathy, No distal edema   Neuro: No gross deficits   Abdomen: Soft, Non tender, No distension      VITALS:   Patient Vitals for the past 8 hrs:   BP Temp Pulse Resp SpO2 Height Weight   21 1307     95 %     21 1305 (!) 138/90  87 16 90 %     21 0938 (!) 166/99 97.1 °F (36.2 °C) 87 16 96 % 5' 6\" (1.676 m) 81.2 kg (179 lb)    , Temp (24hrs), Av.1 °F (36.2 °C), Min:97.1 °F (36.2 °C), Max:97.1 °F (36.2 °C)        Recent Results (from the past 12 hour(s))   CBC WITH AUTOMATED DIFF    Collection Time: 21 10:57 AM   Result Value Ref Range    WBC 10.0 4.3 - 11.1 K/uL    RBC 4.82 4.05 - 5.2 M/uL    HGB 14.3 11.7 - 15.4 g/dL    HCT 44.1 35.8 - 46.3 %    MCV 91.5 79.6 - 97.8 FL    MCH 29.7 26.1 - 32.9 PG    MCHC 32.4 31.4 - 35.0 g/dL    RDW 13.8 11.9 - 14.6 %    PLATELET 168 763 - 434 K/uL    MPV 11.2 9.4 - 12.3 FL    ABSOLUTE NRBC 0.00 0.0 - 0.2 K/uL    DF AUTOMATED      NEUTROPHILS 68 43 - 78 %    LYMPHOCYTES 21 13 - 44 %    MONOCYTES 10 4.0 - 12.0 %    EOSINOPHILS 1 0.5 - 7.8 %    BASOPHILS 1 0.0 - 2.0 %    IMMATURE GRANULOCYTES 0 0.0 - 5.0 %    ABS. NEUTROPHILS 6.8 1.7 - 8.2 K/UL    ABS. LYMPHOCYTES 2.1 0.5 - 4.6 K/UL    ABS. MONOCYTES 1.0 0.1 - 1.3 K/UL    ABS. EOSINOPHILS 0.1 0.0 - 0.8 K/UL    ABS. BASOPHILS 0.1 0.0 - 0.2 K/UL    ABS. IMM.  GRANS. 0.0 0.0 - 0.5 K/UL   METABOLIC PANEL, COMPREHENSIVE    Collection Time: 21 10:57 AM   Result Value Ref Range    Sodium 141 136 - 145 mmol/L    Potassium 3.8 3.5 - 5.1 mmol/L Chloride 111 (H) 98 - 107 mmol/L    CO2 25 21 - 32 mmol/L    Anion gap 5 (L) 7 - 16 mmol/L    Glucose 103 (H) 65 - 100 mg/dL    BUN 16 8 - 23 MG/DL    Creatinine 0.58 (L) 0.6 - 1.0 MG/DL    GFR est AA >60 >60 ml/min/1.73m2    GFR est non-AA >60 >60 ml/min/1.73m2    Calcium 9.0 8.3 - 10.4 MG/DL    Bilirubin, total 0.8 0.2 - 1.1 MG/DL    ALT (SGPT) 28 12 - 65 U/L    AST (SGOT) 22 15 - 37 U/L    Alk. phosphatase 133 50 - 136 U/L    Protein, total 7.2 6.3 - 8.2 g/dL    Albumin 3.7 3.2 - 4.6 g/dL    Globulin 3.5 2.3 - 3.5 g/dL    A-G Ratio 1.1 (L) 1.2 - 3.5     PROTHROMBIN TIME + INR    Collection Time: 06/19/21 10:57 AM   Result Value Ref Range    Prothrombin time 12.8 12.5 - 14.7 sec    INR 0.9     TYPE & SCREEN    Collection Time: 06/19/21 10:57 AM   Result Value Ref Range    Crossmatch Expiration 06/22/2021,2356     ABO/Rh(D) O POSITIVE     Antibody screen NEG           Diagnosis   Patient Active Problem List   Diagnosis Code    Osteoarthritis M19.90    Status post total right knee replacement Z96.651    Fracture of femoral neck, right, closed ([facility] [address] S72.001A    Essential hypertension I10          Assessment     Nondisplaced subcapital impacted right hip fracture    Medical Decision Making:     Patient would benefit from minimally invasive percutaneous pinning. She is at acceptable risk for surgery per Internal Medicine. She is aware of the risks and benefits and wishes to proceed.         Hafsa Hernandez MD  6/19/2021,  2:27 PM

## 2021-06-19 NOTE — ANESTHESIA PREPROCEDURE EVALUATION
Relevant Problems   No relevant active problems       Anesthetic History               Review of Systems / Medical History  Patient summary reviewed and pertinent labs reviewed    Pulmonary    COPD      Smoker         Neuro/Psych              Cardiovascular    Hypertension              Exercise tolerance: >4 METS     GI/Hepatic/Renal     GERD    Renal disease: stones       Endo/Other             Other Findings            Physical Exam    Airway  Mallampati: II  TM Distance: > 6 cm  Neck ROM: normal range of motion        Cardiovascular  Regular rate and rhythm,  S1 and S2 normal,  no murmur, click, rub, or gallop             Dental  No notable dental hx       Pulmonary  Breath sounds clear to auscultation               Abdominal         Other Findings            Anesthetic Plan    ASA: 3  Anesthesia type: general            Anesthetic plan and risks discussed with: Patient      Pt with nausea.  Will proceed with GA with ETT for airway protection

## 2021-06-19 NOTE — ED PROVIDER NOTES
56-year-old female with history of hypertension, GERD, osteoarthritis, status post right total knee replacement presents via EMS from home with complaint of falling down 2 stairs last night landing on right hip. Patient states that she did not hit her head or lose consciousness. Denies neck pain, back pain. Patient states that she was initially able to stand up and ambulate on affected extremity. Patient states that she woke up this morning with increased pain to right hip. Denies shortening of right lower extremity of rotation. Denies numbness, tingling, weakness, back pain, bowel or bladder incontinence, proceeding chest pain, shortness of breath, fever, chills, urinary symptoms. Denies being on any anticoagulation. Rates pain is moderate. Reports some bruising to the lateral aspect of right hip. Patient also states that there is pain localized to the medial aspect of right hip/groin region. The history is provided by the patient. No  was used. Fall  The accident occurred yesterday. The fall occurred while walking. She fell from a height of ground level. She landed on concrete. There was no blood loss. The point of impact was the right hip. The pain is present in the right hip. The pain is at a severity of 4/10. The pain is moderate. Pertinent negatives include no visual change, no fever, no numbness, no abdominal pain, no bowel incontinence, no nausea, no vomiting, no hematuria, no headaches, no extremity weakness, no loss of consciousness, no tingling and no laceration. The symptoms are aggravated by activity and ambulation. She has tried nothing for the symptoms. The treatment provided no relief.         Past Medical History:   Diagnosis Date    Adverse effect of anesthesia     \"hard to knock me out sometimes\"    Arthritis     Claustrophobia     mild    Former cigarette smoker     GERD (gastroesophageal reflux disease)     occassionally-managed with PRN OTC meds    History of melanoma     X2    Hypertension     managed with medication    Ureteral stone        Past Surgical History:   Procedure Laterality Date    HX CATARACT REMOVAL Bilateral 2018,2019    HX LITHOTRIPSY  2020    HX OTHER SURGICAL      melanoma removed from RLE     HX TUBAL LIGATION      2019-\"40 yrs ago\"         Family History:   Problem Relation Age of Onset    Cancer Mother         Bone CA    Heart Disease Mother     Heart Disease Father        Social History     Socioeconomic History    Marital status:      Spouse name: Not on file    Number of children: Not on file    Years of education: Not on file    Highest education level: Not on file   Occupational History    Not on file   Tobacco Use    Smoking status: Former Smoker     Packs/day: 0.25     Years: 20.00     Pack years: 5.00     Quit date: 2019     Years since quittin.0    Smokeless tobacco: Never Used   Substance and Sexual Activity    Alcohol use: Yes     Comment: rarely    Drug use: Never    Sexual activity: Not on file   Other Topics Concern    Not on file   Social History Narrative    Not on file     Social Determinants of Health     Financial Resource Strain:     Difficulty of Paying Living Expenses:    Food Insecurity:     Worried About Running Out of Food in the Last Year:     [address] in the Last Year:    Transportation Needs:     Lack of Transportation (Medical):      Lack of Transportation (Non-Medical):    Physical Activity:     Days of Exercise per Week:     Minutes of Exercise per Session:    Stress:     Feeling of Stress :    Social Connections:     Frequency of Communication with Friends and Family:     Frequency of Social Gatherings with Friends and Family:     Attends Hinduism Services:     Active Member of Clubs or Organizations:     Attends Club or Organization Meetings:     Marital Status:    Intimate Partner Violence:     Fear of Current or Ex-Partner:  Emotionally Abused:     Physically Abused:     Sexually Abused: ALLERGIES: Codeine    Review of Systems   Constitutional: Negative for chills, fatigue and fever. HENT: Negative for congestion, facial swelling and nosebleeds. Eyes: Negative for visual disturbance. Respiratory: Negative for cough and shortness of breath. Cardiovascular: Negative for chest pain and palpitations. Gastrointestinal: Negative for abdominal pain, bowel incontinence, nausea and vomiting. Genitourinary: Negative for dysuria, flank pain and hematuria. Musculoskeletal: Positive for arthralgias and gait problem. Negative for back pain, extremity weakness, neck pain and neck stiffness. Skin: Negative for pallor and wound. Neurological: Negative for dizziness, tingling, seizures, loss of consciousness, syncope, facial asymmetry, weakness, light-headedness, numbness and headaches. Psychiatric/Behavioral: Negative for confusion. Vitals:    06/19/21 0938   BP: (!) 166/99   Pulse: 87   Resp: 16   Temp: 97.1 °F (36.2 °C)   SpO2: 96%   Weight: 81.2 kg (179 lb)   Height: 5' 6\" (1.676 m)            Physical Exam  Vitals and nursing note reviewed. Constitutional:       Appearance: Normal appearance. HENT:      Head: Normocephalic and atraumatic. Comments: Atraumatic. No evidence of basilar skull fracture. Nose: Nose normal.      Mouth/Throat:      Mouth: Mucous membranes are moist.   Eyes:      Extraocular Movements: Extraocular movements intact. Pupils: Pupils are equal, round, and reactive to light. Neck:      Comments: FROM. No midline Cspine TTP. Cardiovascular:      Rate and Rhythm: Normal rate. Pulses: Normal pulses. Heart sounds: Normal heart sounds. Pulmonary:      Effort: Pulmonary effort is normal.      Breath sounds: Normal breath sounds. Abdominal:      General: Bowel sounds are normal.      Palpations: Abdomen is soft. Tenderness:  There is no abdominal tenderness. There is no guarding or rebound. Comments: Soft, NTND. No rebound or guarding. Musculoskeletal:         General: Tenderness present. No deformity. Cervical back: Normal range of motion. Right lower leg: No edema. Comments: R hip with moderate TTP. Bruising noted to lateral aspect of R hip. Limited ROM 2/2 pain. No deformity. FROM R knee, R ankle. DP/PT pulses 2+ and equal throughout. NVID. Cap refill <2 sec. Skin:     General: Skin is warm. Findings: Bruising present. No laceration or rash. Neurological:      General: No focal deficit present. Mental Status: She is alert and oriented to person, place, and time. Motor: No weakness. Comments: Strength 5/5 throughout. Normal sensory exam. No saddle anesthesia. MDM  Number of Diagnoses or Management Options  Closed fracture of neck of right femur, initial encounter [facility]): new and requires workup  Right hip pain: new and requires workup  Diagnosis management comments: Norco 5 mg po + Zofran 4 mg po given. XR R hip pending.   ============================  XR w/ impacted subcapital right femoral neck fracture. XR R femur, CXR, basic labs, ECG, and bailey ordered. Ortho contacted and informed in regards to patient. Will consult Hospitalist for admission.         Amount and/or Complexity of Data Reviewed  Clinical lab tests: ordered and reviewed  Tests in the radiology section of CPT®: ordered and reviewed  Tests in the medicine section of CPT®: ordered and reviewed  Review and summarize past medical records: yes  Discuss the patient with other providers: yes  Independent visualization of images, tracings, or specimens: yes    Risk of Complications, Morbidity, and/or Mortality  Presenting problems: moderate  Diagnostic procedures: moderate  Management options: moderate    Patient Progress  Patient progress: stable    ED Course as of Jun 19 1048   Sat Jun 19, 2021   1041 XR R hip    FINDINGS:   -Pelvic bony ring: Appears intact. -SI joints: Appear symmetric.   -Pubic rami: Appear intact.  -Lower lumbar spine: Mild DJD     -Femoral head: There are osteophytes  -Joint space: Symmetrically with osteophytes. -Femoral neck and proximal femoral shaft:  Impacted subcapital femoral neck  fracture.     IMPRESSION:   Impacted subcapital right femoral neck fracture. Bilateral hip osteoarthritis and lower lumbar degenerative change. [DF]      ED Course User Index  [DF] Felipe Dickinson MD       EKG    Date/Time: 6/19/2021 10:52 AM  Performed by: Felipe Dickinson MD  Authorized by: Felipe Dickinson MD     ECG reviewed by ED Physician in the absence of a cardiologist: yes    Rate:     ECG rate:  86    ECG rate assessment: normal    Rhythm:     Rhythm: sinus rhythm    Ectopy:     Ectopy: none    QRS:     QRS axis:  Normal    QRS intervals:  Normal  Conduction:     Conduction: normal    ST segments:     ST segments:  Normal  T waves:     T waves: normal                     Jr Pierce MD; 6/19/2021 @10:16 AM Voice dictation software was used during the making of this note. This software is not perfect and grammatical and other typographical errors may be present.   This note has not been proofread for errors.  ===================================================================

## 2021-06-19 NOTE — PROGRESS NOTES
INITIAL SPIRITUAL ASSESSMENT     NOTED:      Patient being admitted to floor from [address]    Son -  Kera Morales    Prior code    No acp on file    Medium risk for readmission    WILL ASSESS HOW WE CAN BEST SERVE [address]

## 2021-06-19 NOTE — H&P
[facility] History and Physical       Name:  Jamee Mejia  Age:70 y.o. Sex:female   :  1950    MRN:  [id number]   PCP:  Austin Pretty MD      Admit Date:  2021  9:52 AM   Chief Complaint: right hip pain with difficulty in weight bearing following a fall last night. Reason for Admission:   Closed right hip fracture, initial encounter ([facility] [address] [S72.001A]    Assessment & Plan:     # Closed right femoral subcapital neck fracture:  Neuro vasculature intact  S/p mechanical fall  Radiographic evidence of right femoral neck fracture. Pain control with IV dilaudid and roxicodone  IV toradol q8h prn   Flexeril tid for muscle spasms  Ortho consulted  NPO after midnight  Pt is at moderate risk of intermediate risk surgery. Pt is medically cleared and does not have any acute contraindications for surgery. CXR and ekg unremarkable for acute pathology  DVT prophylaxis with lovenox    # HTN:  BP is suboptimally controlled likely due to pain  Optimizing pain control  Continue lisinopril   Prn hydralazine for SBP>180    Bedrest for now until surgical intervention is done. Disposition/Expected LOS: 3-4 days  Diet: DIET NPO  DIET ADULT  VTE ppx: lovenox  GI ppx: pepcid  Code status: Full Code  Surrogate decision-maker: pt's brother      History of Presenting Illness:     Jamee Mejia is a 72 y.o. female with hx of HTN, OA, s/p R TKA presented to ER with right hip pain and difficulty in walking following a fall last night. Pt reported being in her usual health until last night. She missed a step while climbing down the stairs, lost her balance and landed on her right side. Pt experienced sudden onset of right hip pain and was unable to get up. Pt denied hitting her head or LOC. She currently reports 7/10 intensity pain in right hip, and stated that morphine didn't help her pain much. She denies nausea/vomiting, fever, chills, lightheadedness/dizziness, urinary symptoms.   Blood work was unremarkable. Review of Systems:  A 14 point review of systems was taken and pertinent positive as per HPI. Past Medical History:   Diagnosis Date    Adverse effect of anesthesia     \"hard to knock me out sometimes\"    Arthritis     Claustrophobia     mild    Former cigarette smoker     GERD (gastroesophageal reflux disease)     occassionally-managed with PRN OTC meds    History of melanoma     X2    Hypertension     managed with medication    Ureteral stone        Past Surgical History:   Procedure Laterality Date    HX CATARACT REMOVAL Bilateral 2018,2019    HX LITHOTRIPSY  2020    HX OTHER SURGICAL      melanoma removed from RLE     HX TUBAL LIGATION      2019-\"40 yrs ago\"       Family History : reviewed  Family History   Problem Relation Age of Onset    Cancer Mother         Bone CA    Heart Disease Mother     Heart Disease Father         Social History     Tobacco Use    Smoking status: Former Smoker     Packs/day: 0.25     Years: 20.00     Pack years: 5.00     Quit date: 2019     Years since quittin.0    Smokeless tobacco: Never Used   Substance Use Topics    Alcohol use: Yes     Comment: rarely       Allergies   Allergen Reactions    Codeine Nausea and Vomiting     Increased heart rate       There is no immunization history for the selected administration types on file for this patient.       PTA Medications:  Current Outpatient Medications   Medication Instructions    ascorbic acid (vitamin C) (VITAMIN C) 750 mg, Oral, DAILY    buPROPion XL (WELLBUTRIN XL) 150 mg, Oral, 2 TIMES DAILY    carisoprodoL (SOMA) 350 mg, Oral, BEDTIME PRN    cholecalciferol, vitamin D3, (VITAMIN D3 PO) 1 Tablet, Oral, 2 TIMES DAILY    diphenhydrAMINE-acetaminophen (Tylenol PM Extra Strength)  mg tab 1-2 Tablets, Oral, EVERY 4-6 HOURS PRN    folic acid/multivit-min/lutein (CENTRUM SILVER PO) 1 Tablet, Oral, DAILY    ginkgo biloba (GINKOBA PO) 120 mg, Oral, 2 TIMES DAILY    HYDROmorphone (DILAUDID) 2 mg, Oral, DAILY AS NEEDED    hyoscyamine SL (LEVSIN/SL) 0.125 mg, SubLINGual, EVERY 4 HOURS AS NEEDED    ibuprofen (ADVIL) 400 mg, Oral, 2 TIMES DAILY    lisinopriL (PRINIVIL, ZESTRIL) 10 mg, Oral, EVERY BEDTIME    meclizine (ANTIVERT) 25 mg, Oral, DAILY AS NEEDED    polyethylene glycol (MIRALAX) 17 g, Oral, DAILY, mix with 6 oz of fluids    turmeric 400 mg cap 1 Tablet, Oral, DAILY       Objective:     Patient Vitals for the past 24 hrs:   Temp Pulse Resp BP SpO2   06/19/21 0938 97.1 °F (36.2 °C) 87 16 (!) 166/99 96 %       Oxygen Therapy  O2 Sat (%): 96 % (06/19/21 0938)  O2 Device: None (Room air) (06/19/21 0952)    Body mass index is 28.89 kg/m². Physical Exam:    General:  Alert, awake, mild distress, on RA  HEENT:  Pupils equal and reactive to light and accommodation, oropharynx is clear   Neck:   Supple, no lymphadenopathy, no JVD   Lungs:  Clear to auscultation bilaterally   CV:   Regular rate and rhythm with normal S1 and S2   Abdomen:  Soft, nontender, nondistended, normoactive bowel sounds   Extremities:  RLE internally rotated and shortened, LROM due to pain  Neuro:  gcs 15, no motor or sensory deficits, CN 2-12 intact  Psych:  AOx3, mood and affect appropriate      Data Reviewed: I have reviewed all labs, meds, and studies.       Recent Results (from the past 24 hour(s))   CBC WITH AUTOMATED DIFF    Collection Time: 06/19/21 10:57 AM   Result Value Ref Range    WBC 10.0 4.3 - 11.1 K/uL    RBC 4.82 4.05 - 5.2 M/uL    HGB 14.3 11.7 - 15.4 g/dL    HCT 44.1 35.8 - 46.3 %    MCV 91.5 79.6 - 97.8 FL    MCH 29.7 26.1 - 32.9 PG    MCHC 32.4 31.4 - 35.0 g/dL    RDW 13.8 11.9 - 14.6 %    PLATELET 361 570 - 698 K/uL    MPV 11.2 9.4 - 12.3 FL    ABSOLUTE NRBC 0.00 0.0 - 0.2 K/uL    DF AUTOMATED      NEUTROPHILS 68 43 - 78 %    LYMPHOCYTES 21 13 - 44 %    MONOCYTES 10 4.0 - 12.0 %    EOSINOPHILS 1 0.5 - 7.8 %    BASOPHILS 1 0.0 - 2.0 %    IMMATURE GRANULOCYTES 0 0.0 - 5.0 %    ABS. NEUTROPHILS 6.8 1.7 - 8.2 K/UL    ABS. LYMPHOCYTES 2.1 0.5 - 4.6 K/UL    ABS. MONOCYTES 1.0 0.1 - 1.3 K/UL    ABS. EOSINOPHILS 0.1 0.0 - 0.8 K/UL    ABS. BASOPHILS 0.1 0.0 - 0.2 K/UL    ABS. IMM. GRANS. 0.0 0.0 - 0.5 K/UL   METABOLIC PANEL, COMPREHENSIVE    Collection Time: 06/19/21 10:57 AM   Result Value Ref Range    Sodium 141 136 - 145 mmol/L    Potassium 3.8 3.5 - 5.1 mmol/L    Chloride 111 (H) 98 - 107 mmol/L    CO2 25 21 - 32 mmol/L    Anion gap 5 (L) 7 - 16 mmol/L    Glucose 103 (H) 65 - 100 mg/dL    BUN 16 8 - 23 MG/DL    Creatinine 0.58 (L) 0.6 - 1.0 MG/DL    GFR est AA >60 >60 ml/min/1.73m2    GFR est non-AA >60 >60 ml/min/1.73m2    Calcium 9.0 8.3 - 10.4 MG/DL    Bilirubin, total 0.8 0.2 - 1.1 MG/DL    ALT (SGPT) 28 12 - 65 U/L    AST (SGOT) 22 15 - 37 U/L    Alk. phosphatase 133 50 - 136 U/L    Protein, total 7.2 6.3 - 8.2 g/dL    Albumin 3.7 3.2 - 4.6 g/dL    Globulin 3.5 2.3 - 3.5 g/dL    A-G Ratio 1.1 (L) 1.2 - 3.5     PROTHROMBIN TIME + INR    Collection Time: 06/19/21 10:57 AM   Result Value Ref Range    Prothrombin time 12.8 12.5 - 14.7 sec    INR 0.9         EKG Results     Procedure 720 Value Units Date/Time    EKG, 12 LEAD, INITIAL [[id number]]     Order Status: Completed           All Micro Results     None          Other Studies:  XR CHEST SNGL V    Result Date: 6/19/2021  CHEST X-RAY, one view. HISTORY:  Femur fracture. TECHNIQUE:  AP supine view COMPARISON: None. FINDINGS: Lungs: are clear. Costophrenic angles: are sharp. Heart size: is normal. Pulmonary vasculature: is unremarkable. Aorta: Unremarkable. Included portion of the upper abdomen: is unremarkable. Bones: No gross bony lesions. Other: None. Negative for acute change RIGHT FEMUR 2 view(s). HISTORY: Right knee pain following fall TECHNIQUE: AP and lateral views. COMPARISON: None. FINDINGS / IMPRESSION: Femoral shaft is intact. Status post knee arthroplasty. Femoral neck not well evaluated due to obesity and projection. Dedicated hip films recommended. XR HIP RT W OR WO PELV 2-3 VWS    Result Date: 6/19/2021  PELVIS AND RIGHT HIP, 3 views. HISTORY: [place] onto right hip on the stairs yesterday. TECHNIQUE: AP view of the pelvis and coned down AP and frogleg lateral of the hip. FINDINGS: -Pelvic bony ring: Appears intact. -SI joints: Appear symmetric. -Pubic rami: Appear intact. -Lower lumbar spine: Mild DJD -Femoral head: There are osteophytes -Joint space: Symmetrically with osteophytes. -Femoral neck and proximal femoral shaft:  Impacted subcapital femoral neck fracture. Impacted subcapital right femoral neck fracture. Bilateral hip osteoarthritis and lower lumbar degenerative change. XR FEMUR RT 2 VS    Result Date: 6/19/2021  CHEST X-RAY, one view. HISTORY:  Femur fracture. TECHNIQUE:  AP supine view COMPARISON: None. FINDINGS: Lungs: are clear. Costophrenic angles: are sharp. Heart size: is normal. Pulmonary vasculature: is unremarkable. Aorta: Unremarkable. Included portion of the upper abdomen: is unremarkable. Bones: No gross bony lesions. Other: None. Negative for acute change RIGHT FEMUR 2 view(s). HISTORY: Right knee pain following fall TECHNIQUE: AP and lateral views. COMPARISON: None. FINDINGS / IMPRESSION: Femoral shaft is intact. Status post knee arthroplasty. Femoral neck not well evaluated due to obesity and projection. Dedicated hip films recommended.          Medications:  Medications Administered       acetaminophen (TYLENOL) tablet 650 mg       Admin Date  03/13/2021 Action  Given Dose  650 mg Route  Oral Administered By  Tiff Castillo RN              cefTRIAXone (ROCEPHIN) 1 g in 0.9% sodium chloride (MBP/ADV) 50 mL MBP       Admin Date  03/13/2021 Action  New Bag Dose  1 g Rate  100 mL/hr Route  IntraVENous Administered By  Dana Gentile RN              cefTRIAXone (ROCEPHIN) 2 g in 0.9% sodium chloride (MBP/ADV) 50 mL MBP       Admin Date  03/13/2021 Action  New Bag Dose  2 g Rate  100 mL/hr Route  IntraVENous Administered By  Daniela Wong RN               Admin Date  03/14/2021 Action  New Bag Dose  2 g Rate  100 mL/hr Route  IntraVENous Administered By  Suman Lin RN               Admin Date  03/15/2021 Action  New Bag Dose  2 g Rate  100 mL/hr Route  IntraVENous Administered By  Suman Lin RN               Admin Date  03/16/2021 Action  New Bag Dose  2 g Rate  100 mL/hr Route  IntraVENous Administered By  Chio Voss              diphenhydrAMINE (BENADRYL) injection 25 mg       Admin Date  03/14/2021 Action  Given Dose  25 mg Route  IntraVENous Administered By  Dio Guzmán RN              doxycycline (VIBRAMYCIN) 100 mg in 0.9% sodium chloride (MBP/ADV) 100 mL MBP       Admin Date  03/13/2021 Action  New Bag Dose  100 mg Rate  100 mL/hr Route  IntraVENous Administered By  Daniela Wong RN               Admin Date  03/13/2021 Action  New Bag Dose  100 mg Rate  100 mL/hr Route  IntraVENous Administered By  Dio Guzmán RN               Admin Date  03/14/2021 Action  New Bag Dose  100 mg Rate  100 mL/hr Route  IntraVENous Administered By  Suman Lin RN               Admin Date  03/14/2021 Action  New Bag Dose  100 mg Rate  100 mL/hr Route  IntraVENous Administered By  Dio Guzmán RN               Admin Date  03/15/2021 Action  New Bag Dose  100 mg Rate  100 mL/hr Route  IntraVENous Administered By  Suman Lin RN              enoxaparin (LOVENOX) injection 40 mg       Admin Date  03/13/2021 Action  Given Dose  40 mg Route  SubCUTAneous Administered By  Daniela Wong RN               Admin Date  03/13/2021 Action  Given Dose  40 mg Route  SubCUTAneous Administered By  Dio Guzmán RN               Admin Date  03/14/2021 Action  Given Dose  40 mg Route  SubCUTAneous Administered By  Suman Lin RN               Admin Date  03/14/2021 Action  Given Dose  40 mg Route  SubCUTAneous Administered By  Dio Guzmán RN               Admin Date  03/15/2021 Action  Given Dose  40 mg Route  SubCUTAneous Administered By  Ericka Norman RN               Admin Date  03/15/2021 Action  Given Dose  40 mg Route  SubCUTAneous Administered By  Guillermina Menjivar RN               Admin Date  03/16/2021 Action  Given Dose  40 mg Route  SubCUTAneous Administered By  Rodger Maradiaga              fentaNYL citrate (PF) injection 50 mcg       Admin Date  03/13/2021 Action  Given Dose  50 mcg Route  IntraVENous Administered By  Renee Carrion, RN               Admin Date  03/14/2021 Action  Given Dose  50 mcg Route  IntraVENous Administered By  Arnol Pino RN              ferrous sulfate tablet 325 mg       Admin Date  03/14/2021 Action  Given Dose  325 mg Route  Oral Administered By  Ericka Norman RN               Admin Date  03/14/2021 Action  Given Dose  325 mg Route  Oral Administered By  Ericka Norman RN               Admin Date  03/15/2021 Action  Given Dose  325 mg Route  Oral Administered By  Ericka Norman RN              folic acid (FOLVITE) tablet 1 mg       Admin Date  03/14/2021 Action  Given Dose  1 mg Route  Oral Administered By  Ericka Norman RN               Admin Date  03/15/2021 Action  Given Dose  1 mg Route  Oral Administered By  Ericka Norman RN               Admin Date  03/16/2021 Action  Given Dose  1 mg Route  Oral Administered By  Rodger Maradiaga              furosemide (LASIX) injection 20 mg       Admin Date  03/15/2021 Action  Given Dose  20 mg Route  IntraVENous Administered By  Ericka Norman RN              furosemide (LASIX) injection 40 mg       Admin Date  03/13/2021 Action  Given Dose  40 mg Route  IntraVENous Administered By  Imelda Deluna, RN               Admin Date  03/13/2021 Action  Given Dose  40 mg Route  IntraVENous Administered By  Arnol Pino RN               Admin Date  03/14/2021 Action  Given Dose  40 mg Route  IntraVENous Administered By  Ericka Norman RN               Admin Date  03/14/2021 Action  Given Dose  40 mg Route  IntraVENous Administered By  Drake Campbell RN               Admin Date  03/15/2021 Action  Given Dose  40 mg Route  IntraVENous Administered By  Fazal Sadler RN               Admin Date  03/16/2021 Action  Given Dose  40 mg Route  IntraVENous Administered By  Chalo Barnett              HYDROcodone-acetaminophen [facility]) 5-325 mg per tablet 1 Tab       Admin Date  03/14/2021 Action  Given Dose  1 Tab Route  Oral Administered By  Moe Bailon RN               Admin Date  03/15/2021 Action  Given Dose  1 Tab Route  Oral Administered By  Drake Campbell RN              insulin lispro (HUMALOG) injection       Admin Date  03/13/2021 Action  Given Dose  2 Units Route  SubCUTAneous Administered By  Drake Campbell RN               Admin Date  03/14/2021 Action  Given Dose  2 Units Route  SubCUTAneous Administered By  Moe Bailon RN               Admin Date  03/14/2021 Action  Given Dose  2 Units Route  SubCUTAneous Administered By  Moe Bailon RN               Admin Date  03/14/2021 Action  Given Dose  4 Units Route  SubCUTAneous Administered By  Drake Campbell RN               Admin Date  03/15/2021 Action  Given Dose  2 Units Route  SubCUTAneous Administered By  Moe Bailon RN               Admin Date  03/15/2021 Action  Given Dose  4 Units Route  SubCUTAneous Administered By  Moe Bailon RN               Admin Date  03/15/2021 Action  Given Dose  2 Units Route  SubCUTAneous Administered By  Fazal Sadler RN               Admin Date  03/16/2021 Action  Given Dose  4 Units Route  SubCUTAneous Administered By  Chalo Barnett              levothyroxine (SYNTHROID) tablet 137 mcg       Admin Date  03/14/2021 Action  Given Dose  137 mcg Route  Oral Administered By  Moe Bailon RN               Admin Date  03/15/2021 Action  Given Dose  137 mcg Route  Oral Administered By  Moe Bailon RN               Admin Date  03/16/2021 Action  Given Dose  137 mcg Route  Oral Administered By  Aneudy Fitch RN              midodrine (PROAMATINE) tablet 10 mg       Admin Date  03/14/2021 Action  Given Dose  10 mg Route  Oral Administered By  Suman Lin RN               Admin Date  03/14/2021 Action  Given Dose  10 mg Route  Oral Administered By  Suman Lin RN               Admin Date  03/15/2021 Action  Given Dose  10 mg Route  Oral Administered By  Suman Lin RN               Admin Date  03/15/2021 Action  Given Dose  10 mg Route  Oral Administered By  Suman Lin RN               Admin Date  03/15/2021 Action  Given Dose  10 mg Route  Oral Administered By  Suman Lin RN               Admin Date  03/16/2021 Action  Given Dose  10 mg Route  Oral Administered By  Arsen Argueta Date  03/16/2021 Action  Given Dose  10 mg Route  Oral Administered By  Arsen Argueta Date  03/16/2021 Action  Given Dose  10 mg Route  Oral Administered By  [place]) injection 2 mg       Admin Date  03/13/2021 Action  Given Dose  2 mg Route  IntraVENous Administered By  Vera Garcia RN              NOREPINephrine (LEVOPHED) 4 mg in 5% dextrose 250 mL infusion       Admin Date  03/14/2021 Action  New Bag Dose  4 mcg/min Rate  15 mL/hr Route  IntraVENous Administered By  Suman Lin RN               Admin Date  03/14/2021 Action  Rate Change Dose  6 mcg/min Rate  22.5 mL/hr Route  IntraVENous Administered By  Suman Lin RN               Admin Date  03/14/2021 Action  Rate Change Dose  4 mcg/min Rate  15 mL/hr Route  IntraVENous Administered By  Suman Lin RN               Admin Date  03/14/2021 Action  Rate Change Dose  2 mcg/min Rate  7.5 mL/hr Route  IntraVENous Administered By  Suman Lin RN               Admin Date  03/14/2021 Action  Rate Change Dose  1 mcg/min Rate  3.8 mL/hr Route  IntraVENous Administered By  Suman Lin RN               Admin Date  03/14/2021 Action  Restarted Dose  2 mcg/min Rate  7.5 mL/hr Route  IntraVENous Administered By  Jenna Mai RN               Admin Date  03/14/2021 Action  Rate Change Dose  4 mcg/min Rate  15 mL/hr Route  IntraVENous Administered By  Jenna Mai RN               Admin Date  03/15/2021 Action  Rate Change Dose  2 mcg/min Rate  7.5 mL/hr Route  IntraVENous Administered By  Jeremy Marlow RN              [facility]) injection 4 mg       Admin Date  03/13/2021 Action  Given Dose  4 mg Route  IntraVENous Administered By  Monse Beebe RN              pantoprazole (PROTONIX) tablet 40 mg       Admin Date  03/14/2021 Action  Given Dose  40 mg Route  Oral Administered By  Jenna Mai RN               Admin Date  03/15/2021 Action  Given Dose  40 mg Route  Oral Administered By  Jenna Mai RN               Admin Date  03/16/2021 Action  Given Dose  40 mg Route  Oral Administered By  Katarzyna Mir              perflutren lipid microspheres (DEFINITY) in NS bolus IV       Admin Date  03/13/2021 Action  Given Dose  1 mL Route  IntraVENous Administered By  Len Phalen D, RCS              potassium chloride (K-DUR, KLOR-CON) SR tablet 40 mEq       Admin Date  03/15/2021 Action  Given Dose  40 mEq Route  Oral Administered By  Jenna Mai RN               Admin Date  03/16/2021 Action  Given Dose  40 mEq Route  Oral Administered By  Katarzyna Mir              potassium chloride 40 mEq IVPB       Admin Date  03/15/2021 Action  New Bag Dose  40 mEq Rate  25 mL/hr Route  IntraVENous Administered By  Jeremy Marlow RN              pregabalin (LYRICA) capsule 300 mg       Admin Date  03/14/2021 Action  Given Dose  300 mg Route  Oral Administered By  Jenna Mai RN               Admin Date  03/14/2021 Action  Given Dose  300 mg Route  Oral Administered By  Jenna Mai RN               Admin Date  03/15/2021 Action  Given Dose  300 mg Route  Oral Administered By  Jenna Mai RN               Admin Date  03/15/2021 Action  Given Dose  300 mg Route  Oral Administered By  Suellen Puentes RN               Admin Date  03/16/2021 Action  Given Dose  300 mg Route  Oral Administered By  Andi Mak              sertraline (ZOLOFT) tablet 300 mg       Admin Date  03/14/2021 Action  Given Dose  300 mg Route  Oral Administered By  Katlin Perez RN               Admin Date  03/15/2021 Action  Given Dose  300 mg Route  Oral Administered By  Katlin Perez RN               Admin Date  03/16/2021 Action  Given Dose  300 mg Route  Oral Administered By  Andi Mak              sodium chloride (NS) flush 5-40 mL       Admin Date  03/13/2021 Action  Given Dose  10 mL Route  IntraVENous Administered By  Tati Renee RN               Admin Date  03/13/2021 Action  Given Dose  30 mL Route  IntraVENous Administered By  Tati Renee RN               Admin Date  03/13/2021 Action  Given Dose  40 mL Route  IntraVENous Administered By  Amor Delacruz RN               Admin Date  03/14/2021 Action  Given Dose  40 mL Route  IntraVENous Administered By  Amor Delacruz RN               Admin Date  03/14/2021 Action  Given Dose  10 mL Route  IntraVENous Administered By  Katlin Perez RN               Admin Date  03/14/2021 Action  Given Dose  40 mL Route  IntraVENous Administered By  Amor Delacruz RN               Admin Date  03/15/2021 Action  Given Dose  40 mL Route  IntraVENous Administered By  Amor Delacruz RN               Admin Date  03/15/2021 Action  Given Dose  10 mL Route  IntraVENous Administered By  Katiln Perez RN               Admin Date  03/15/2021 Action  Given Dose  10 mL Route  IntraVENous Administered By  Martha Griffith RN               Admin Date  03/16/2021 Action  Given Dose  10 mL Route  IntraVENous Administered By  Andi Mak              sodium chloride 0.9 % bolus infusion 250 mL       Admin Date  03/14/2021 Action  New Bag Dose  250 mL Rate  250 mL/hr Route  IntraVENous Administered By  Liliana Dominguez RN              tiZANidine (ZANAFLEX) tablet 4 mg       Admin Date  03/14/2021 Action  Given Dose  4 mg Route  Oral Administered By  Chris Durand RN               Admin Date  03/14/2021 Action  Given Dose  4 mg Route  Oral Administered By  Liliana Dominguez RN               Admin Date  03/14/2021 Action  Given Dose  4 mg Route  Oral Administered By  Liliana Dominguez RN               Admin Date  03/14/2021 Action  Given Dose  4 mg Route  Oral Administered By  Chris Durand RN               Admin Date  03/15/2021 Action  Given Dose  4 mg Route  Oral Administered By  Liliana Dominguez RN               Admin Date  03/15/2021 Action  Given Dose  4 mg Route  Oral Administered By  Liliana Dominguez RN               Admin Date  03/15/2021 Action  Given Dose  4 mg Route  Oral Administered By  Ozzy Marion RN               Admin Date  03/16/2021 Action  Given Dose  4 mg Route  Oral Administered By  Collette Elks Date  03/16/2021 Action  Given Dose  4 mg Route  Oral Administered By  Darrell Mirza              traZODone (DESYREL) tablet 150 mg       Admin Date  03/14/2021 Action  Given Dose  150 mg Route  Oral Administered By  Chris Durand RN               Admin Date  03/15/2021 Action  Given Dose  150 mg Route  Oral Administered By  Ozzy Marion RN              tuberculin injection 5 Units       Admin Date  03/15/2021 Action  Give PPD Dose  5 Units Route  IntraDERMal Administered By  Liliana Dominguez RN              vancomycin (VANCOCIN) 2500 mg in  mL infusion       Admin Date  03/13/2021 Action  Given Dose  2,500 mg Rate  200 mL/hr Route  IntraVENous Administered By  Daly June RN              zonisamide (ZONEGRAN) capsule 300 mg       Admin Date  03/14/2021 Action  Given Dose  300 mg Route  Oral Administered By  Chris Durand RN               Admin Date  03/15/2021 Action  Given Dose  300 mg Route  Oral Administered By  Ozzy Marion RN Problem List:     Hospital Problems as of 6/19/2021 Date Reviewed: 3/9/2020        Codes Class Noted - Resolved POA    * (Principal) Fracture of femoral neck, right, closed ([facility] [address] ICD-10-CM: S72.001A  ICD-9-CM: 820.8  6/19/2021 - Present         Essential hypertension ICD-10-CM: I10  ICD-9-CM: 401.9  6/19/2021 - Present Unknown        Osteoarthritis ICD-10-CM: M19.90  ICD-9-CM: 715.90  3/18/2020 - Present Yes        Status post total right knee replacement ICD-10-CM: G73.576  ICD-9-CM: V43.65  3/18/2020 - Present Yes                 Signed By: Almita Bobo MD   [employer] [facility]    June 19, 2021  4:22 PM

## 2021-06-19 NOTE — ACP (ADVANCE CARE PLANNING)
Advance care planninginitial encounter      Face to face time - 20 minutes face-to-face time spent discussion the care       Patient is able to make his/her own decisions:  [X] Yes  [ ] NO    Names of POA/surrogate decision maker when patient is altered  :Pt's brother    Other members present in the meeting : none    Patient / surrogate decision-maker ultimately chose. .. [X] Full code- full aggressive medical and surgical interventions, including intubations, resuscitations, pressors, artificial tube feeding  [ ] DO NOT RESUSCITATE -okay to intubate,  and other aggressive medical and surgical intervention   [ ] Not resuscitate, DO NOT INTUBATE, but okay for other aggressive medical and surgical intervention   [ ] DNR/DNI, hospice, comfort focus care to maximize patient's quality of life  [ ] DNR/DNI, strict comfort care ONLY        Further discussion regarding principle disease process.  prognosis, plans of care, and treatment goals

## 2021-06-19 NOTE — ED TRIAGE NOTES
Pt arrives via EMS from home reports falling down 2 stairs last night and landed on right hip and was able to get herself up and ambulate. Pt woke up with pain in the right hip today. Denies shortening and rotations. PMS in tact. VSS in route.

## 2021-06-19 NOTE — OP NOTES
Operative Report    Patient: Leatha Sandoval MRN: [id number]  SSN: [id number]    YOB: 1950  Age: 72 y.o. Sex: female       Date of Surgery: 6/19/2021     Preoperative Diagnosis: Right femoral neck fracture     Postoperative Diagnosis: Right femoral neck fracture     Surgeon(s) and Role:     Saw Burgos MD - Primary    Anesthesia: General     Procedure: Procedure(s):  HIP PERCUTANEOUS PINNING CANNULATED SCREWS RIGHT     Procedure in Detail: Patient was taken to the operating room and placed under general anesthesia. She was placed supine on the fracture table. The left foot placed in the well-leg polanco. The right foot placed in the traction device. Reduction of the right hip was confirmed on fluoroscopy. We then prepped and draped the right hip in sterile fashion. Using fluoroscopic guidance I made a incision over the lateral hip approximately 3 cm in length. I incised sharply down to the bone. We placed 1 guidepin for the Synthes 7.3 mm cannulated screws across the fracture into the femoral head and the central portion. This was confirmed on fluoroscopy. Confirmed in the AP and lateral planes. We then measured this appropriately and drilled the proximal cortex. I placed the 90 mm 7.3 mm short threaded annular screw over the guidepin. We then used the aiming device to place 2 other cannulated screws across the fracture superior and posterior to the original screw. Screw lengths are documented in the note. This gave us excellent fixation. We then irrigated the wound. Closed the fascia with a 2-0 Vicryl. The skin was closed with 2-0 Vicryls and staples. This was covered with a sterile dressing. Patient was then awakened. She was extubated and taken to recovery room in stable condition. There were no complications. Estimated Blood Loss: Minimal    Tourniquet Time: * No tourniquets in log *      Implants:   Implant Name Type Inv.  Item Serial No.  Lot No. LRB No. Used Action   SCREW BNE L85MM DIA7. 3MM THRD L16MM STEPHANIE TI ST SELF DRL - [id number]  SCREW BNE L85MM DIA7. 3MM THRD L16MM STEPHANIE TI ST SELF DRL  DE[employer]Y [employer] [facility] [id number] Right 2 Implanted   SCREW BNE L90MM DIA7. 3MM THRD L16MM TI LEONARDO PARTIALLY - [id number]  SCREW BNE L90MM DIA7. 3MM THRD L16MM TI LEONARDO PARTIALLY  [employer] [facility] [id number] Right 1 Implanted               Specimens: * No specimens in log *        Drains: None                Complications: None    Counts: Sponge and needle counts were correct times two.     Signed By:  Kylah Medina MD     June 19, 2021

## 2021-06-19 NOTE — PERIOP NOTES
TRANSFER - OUT REPORT:    Verbal report given to Eliezer Horner RN on Ney Screen  being transferred to [address] [place] [address] for routine post - op       Report consisted of patients Situation, Background, Assessment and   Recommendations(SBAR). Information from the following report(s) SBAR, Kardex, Procedure Summary and Intake/Output was reviewed with the receiving nurse. Lines:   Peripheral IV 06/19/21 Left Forearm (Active)   Site Assessment Clean;Clean, dry, & intact 06/19/21 1636   Phlebitis Assessment 0 06/19/21 1636   Infiltration Assessment 0 06/19/21 1636   Dressing Status Clean, dry, & intact 06/19/21 1636   Dressing Type Transparent;Tape 06/19/21 1636   Hub Color/Line Status Patent 06/19/21 1636   Alcohol Cap Used No 06/19/21 1514        Opportunity for questions and clarification was provided. Patient transported with:   Tech    VTE prophylaxis orders have not been written for Ney Maza. Patient and family given floor number and nurses name. Family updated re: pt status after security code verified.

## 2021-06-19 NOTE — PROGRESS NOTES
PT Note:  Evaluation received and chart reviewed. Will hold evaluation pending ortho consult/intervention.   Thanks,  Sam Hylton, PT, DPT

## 2021-06-19 NOTE — ED NOTES
TRANSFER - OUT REPORT:    Verbal report given to Ernesto bob RN on Dominique Castro  being transferred to 7th floor for routine progression of care       Report consisted of patients Situation, Background, Assessment and   Recommendations(SBAR). Information from the following report(s) SBAR, ED Summary and MAR was reviewed with the receiving nurse. Lines:   Peripheral IV 06/19/21 Left Forearm (Active)   Site Assessment Clean, dry, & intact 06/19/21 1100   Phlebitis Assessment 0 06/19/21 1100   Infiltration Assessment 0 06/19/21 1100   Dressing Status Clean, dry, & intact 06/19/21 1100        Opportunity for questions and clarification was provided.       Patient transported with:   O2 @ 2 liters

## 2021-06-20 LAB
ALBUMIN SERPL-MCNC: 3.1 G/DL (ref 3.2–4.6)
ALBUMIN/GLOB SERPL: 0.9 {RATIO} (ref 1.2–3.5)
ALP SERPL-CCNC: 120 U/L (ref 50–136)
ALT SERPL-CCNC: 21 U/L (ref 12–65)
ANION GAP SERPL CALC-SCNC: 5 MMOL/L (ref 7–16)
AST SERPL-CCNC: 15 U/L (ref 15–37)
ATRIAL RATE: 86 BPM
BILIRUB SERPL-MCNC: 0.7 MG/DL (ref 0.2–1.1)
BUN SERPL-MCNC: 9 MG/DL (ref 8–23)
CALCIUM SERPL-MCNC: 8.3 MG/DL (ref 8.3–10.4)
CALCULATED P AXIS, ECG09: 49 DEGREES
CALCULATED R AXIS, ECG10: -24 DEGREES
CALCULATED T AXIS, ECG11: 53 DEGREES
CHLORIDE SERPL-SCNC: 112 MMOL/L (ref 98–107)
CO2 SERPL-SCNC: 25 MMOL/L (ref 21–32)
CREAT SERPL-MCNC: 0.4 MG/DL (ref 0.6–1)
DIAGNOSIS, 93000: NORMAL
ERYTHROCYTE [DISTWIDTH] IN BLOOD BY AUTOMATED COUNT: 13.9 % (ref 11.9–14.6)
GLOBULIN SER CALC-MCNC: 3.4 G/DL (ref 2.3–3.5)
GLUCOSE SERPL-MCNC: 92 MG/DL (ref 65–100)
HCT VFR BLD AUTO: 40.8 % (ref 35.8–46.3)
HGB BLD-MCNC: 13.3 G/DL (ref 11.7–15.4)
MAGNESIUM SERPL-MCNC: 1.7 MG/DL (ref 1.8–2.4)
MCH RBC QN AUTO: 29.8 PG (ref 26.1–32.9)
MCHC RBC AUTO-ENTMCNC: 32.6 G/DL (ref 31.4–35)
MCV RBC AUTO: 91.3 FL (ref 79.6–97.8)
NRBC # BLD: 0 K/UL (ref 0–0.2)
P-R INTERVAL, ECG05: 146 MS
PLATELET # BLD AUTO: 179 K/UL (ref 150–450)
PMV BLD AUTO: 10.9 FL (ref 9.4–12.3)
POTASSIUM SERPL-SCNC: 3.8 MMOL/L (ref 3.5–5.1)
PROT SERPL-MCNC: 6.5 G/DL (ref 6.3–8.2)
Q-T INTERVAL, ECG07: 372 MS
QRS DURATION, ECG06: 98 MS
QTC CALCULATION (BEZET), ECG08: 445 MS
RBC # BLD AUTO: 4.47 M/UL (ref 4.05–5.2)
SODIUM SERPL-SCNC: 142 MMOL/L (ref 136–145)
VENTRICULAR RATE, ECG03: 86 BPM
WBC # BLD AUTO: 10.1 K/UL (ref 4.3–11.1)

## 2021-06-20 PROCEDURE — 36415 COLL VENOUS BLD VENIPUNCTURE: CPT

## 2021-06-20 PROCEDURE — 97530 THERAPEUTIC ACTIVITIES: CPT

## 2021-06-20 PROCEDURE — 97535 SELF CARE MNGMENT TRAINING: CPT

## 2021-06-20 PROCEDURE — 97165 OT EVAL LOW COMPLEX 30 MIN: CPT

## 2021-06-20 PROCEDURE — 80053 COMPREHEN METABOLIC PANEL: CPT

## 2021-06-20 PROCEDURE — 97112 NEUROMUSCULAR REEDUCATION: CPT

## 2021-06-20 PROCEDURE — 74011250637 HC RX REV CODE- 250/637: Performed by: ORTHOPAEDIC SURGERY

## 2021-06-20 PROCEDURE — 83735 ASSAY OF MAGNESIUM: CPT

## 2021-06-20 PROCEDURE — 74011000250 HC RX REV CODE- 250: Performed by: ORTHOPAEDIC SURGERY

## 2021-06-20 PROCEDURE — 74011250636 HC RX REV CODE- 250/636: Performed by: ORTHOPAEDIC SURGERY

## 2021-06-20 PROCEDURE — 74011250637 HC RX REV CODE- 250/637: Performed by: FAMILY MEDICINE

## 2021-06-20 PROCEDURE — 74011250636 HC RX REV CODE- 250/636: Performed by: HOSPITALIST

## 2021-06-20 PROCEDURE — 65270000029 HC RM PRIVATE

## 2021-06-20 PROCEDURE — 97161 PT EVAL LOW COMPLEX 20 MIN: CPT

## 2021-06-20 PROCEDURE — 85027 COMPLETE CBC AUTOMATED: CPT

## 2021-06-20 PROCEDURE — 74011250637 HC RX REV CODE- 250/637: Performed by: HOSPITALIST

## 2021-06-20 PROCEDURE — 97116 GAIT TRAINING THERAPY: CPT

## 2021-06-20 PROCEDURE — 2709999900 HC NON-CHARGEABLE SUPPLY

## 2021-06-20 RX ORDER — LISINOPRIL 20 MG/1
20 TABLET ORAL
Status: DISCONTINUED | OUTPATIENT
Start: 2021-06-20 | End: 2021-06-21 | Stop reason: HOSPADM

## 2021-06-20 RX ADMIN — CEFAZOLIN SODIUM 2 G: 100 INJECTION, POWDER, LYOPHILIZED, FOR SOLUTION INTRAVENOUS at 06:06

## 2021-06-20 RX ADMIN — SODIUM CHLORIDE 100 ML/HR: 900 INJECTION, SOLUTION INTRAVENOUS at 03:07

## 2021-06-20 RX ADMIN — Medication 1 AMPULE: at 22:06

## 2021-06-20 RX ADMIN — Medication 10 ML: at 06:06

## 2021-06-20 RX ADMIN — FAMOTIDINE 20 MG: 20 TABLET ORAL at 09:40

## 2021-06-20 RX ADMIN — ASPIRIN 325 MG: 325 TABLET, COATED ORAL at 09:42

## 2021-06-20 RX ADMIN — Medication 10 ML: at 15:44

## 2021-06-20 RX ADMIN — Medication 10 ML: at 22:07

## 2021-06-20 RX ADMIN — DEXAMETHASONE SODIUM PHOSPHATE 10 MG: 10 INJECTION, SOLUTION INTRAMUSCULAR; INTRAVENOUS at 15:44

## 2021-06-20 RX ADMIN — ACETAMINOPHEN 1000 MG: 500 TABLET ORAL at 06:05

## 2021-06-20 RX ADMIN — LISINOPRIL 20 MG: 20 TABLET ORAL at 13:09

## 2021-06-20 RX ADMIN — Medication 1 AMPULE: at 09:42

## 2021-06-20 RX ADMIN — ACETAMINOPHEN 1000 MG: 500 TABLET ORAL at 22:10

## 2021-06-20 RX ADMIN — CYCLOBENZAPRINE 5 MG: 10 TABLET, FILM COATED ORAL at 22:06

## 2021-06-20 RX ADMIN — HYDROMORPHONE HYDROCHLORIDE 1 MG: 1 INJECTION, SOLUTION INTRAMUSCULAR; INTRAVENOUS; SUBCUTANEOUS at 13:08

## 2021-06-20 RX ADMIN — FAMOTIDINE 20 MG: 20 TABLET ORAL at 17:18

## 2021-06-20 RX ADMIN — SENNOSIDES AND DOCUSATE SODIUM 2 TABLET: 8.6; 5 TABLET ORAL at 09:40

## 2021-06-20 RX ADMIN — ENOXAPARIN SODIUM 40 MG: 40 INJECTION SUBCUTANEOUS at 15:44

## 2021-06-20 RX ADMIN — Medication 500 MG: at 13:08

## 2021-06-20 RX ADMIN — HYDROMORPHONE HYDROCHLORIDE 1 MG: 1 INJECTION, SOLUTION INTRAMUSCULAR; INTRAVENOUS; SUBCUTANEOUS at 22:06

## 2021-06-20 RX ADMIN — Medication 500 MG: at 17:17

## 2021-06-20 RX ADMIN — HYDROMORPHONE HYDROCHLORIDE 1 MG: 1 INJECTION, SOLUTION INTRAMUSCULAR; INTRAVENOUS; SUBCUTANEOUS at 03:04

## 2021-06-20 RX ADMIN — ACETAMINOPHEN 1000 MG: 500 TABLET ORAL at 13:08

## 2021-06-20 NOTE — PROGRESS NOTES
2021         Post Op day: 1 Day Post-Op Procedure(s) (LRB):  HIP PERCUTANEOUS PINNING CANNULATED SCREWS RIGHT (Right)      Admit Date: 2021  Admit Diagnosis: Closed right hip fracture, initial encounter ([facility] [address] [S72.001A]       Principle Problem: Fracture of femoral neck, right, closed ([address] Subjective: Doing well, No complaints, No SOB, No Chest Pain, No Nausea or Vomiting  Patient wants to have her own walker so she can get around dependently. Objective:   Vital Signs are Stable, No Acute Distress, Alert,  Dressing is Dry,  Neurovascular exam   is normal.       Assessment / Plan :  Patient Active Problem List   Diagnosis Code    Osteoarthritis M19.90    Status post total right knee replacement Z96.651    Fracture of femoral neck, right, closed ([facility] [address] S72.001A    Essential hypertension I10      Patient Vitals for the past 8 hrs:   BP Temp Pulse Resp SpO2   21 0817 (!) 157/83 98.4 °F (36.9 °C) 93 18 99 %   21 0439 (!) 146/88 97.5 °F (36.4 °C) 88 18 94 %    Temp (24hrs), Av.7 °F (36.5 °C), Min:97.5 °F (36.4 °C), Max:98.4 °F (36.9 °C)    Body mass index is 28.89 kg/m². Lab Results   Component Value Date/Time    HGB 13.3 2021 04:52 AM          Medical Mgmt per hospitalist  Anticoagulation plan: Aspirin  Continue PT  Fall Precautions  DC disp: Very soon.   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

## 2021-06-20 NOTE — PROGRESS NOTES
ACUTE OT GOALS:  (Developed with and agreed upon by patient and/or caregiver.)  1. Patient will complete lower body bathing and dressing with Mod I and adaptive equipment as needed. 2. Patient will complete toileting with Mod I and adaptive equipment as needed. 3. Patient will verbalize and demonstrate TDWB in RLE with 100% accuracy during performance of ADLs. 4. Patient will complete functional transfers with Mod I and adaptive equipment as needed. 5. Patient will complete standing grooming ADL with Mod I and adaptive equipment as needed. Timeframe: 7 visits     OCCUPATIONAL THERAPY ASSESSMENT: Initial Assessment, Daily Note and AM OT Treatment Day # 1   TDWB RLE    Gordon Alejandro is a 72 y.o. female   PRIMARY DIAGNOSIS: Fracture of femoral neck, right, closed ([facility] [address]  [facility] [S72.001A]  Procedure(s) (LRB):  HIP PERCUTANEOUS PINNING CANNULATED SCREWS RIGHT (Right)  1 Day Post-Op  Reason for Referral:  Post-operative mobilization  ICD-10: Treatment Diagnosis: Generalized Muscle Weakness (M62.81)  Difficulty in walking, Not elsewhere classified (R26.2)  INPATIENT: Pay[place]: [place]ARE / Plan: [place] MED[place]ARE PART A AND B / Product Type: Medicare /   ASSESSMENT:     REHAB RECOMMENDATIONS:   Recommendation to date pending progress:  [address] [place] [address] Therapy  Equipment:    Rolling Walker     PRIOR LEVEL OF FUNCTION:  (Prior to Hospitalization)  INITIAL/CURRENT LEVEL OF FUNCTION:  (Based on today's evaluation)   Bathing:   Modified Independent with use of SC as needed. Dressing:   Independent  Feeding/Grooming:   Independent  Toileting:   Independent  Functional Mobility:   Independent Bathing:   Moderate Assistance for lower body bathing. Dressing:   Moderate Assistance for lower dressing. Feeding/Grooming:   Contact Guard Assistance standing at sinkside. Toileting:   Minimal Assistance  Functional Mobility:   Min to CGA with use of RW. ASSESSMENT:  Ms. Cl Davison was admitted following fall and found to have R hip fracture. Pt is now s/p R hip percutaneous pinning and in TDWB in RLE. Pt presents with decreased strength, balance, and activity tolerance for performance of ADLs and functional mobility. Requires SBA to transfer to edge of bed. BUE WFL for strength, ROM, and coordination. Requires Min A with use of RW and cueing for TDWB status to complete sit <> stand transfers. Upon standing, pt provided with minimal verbal, visual, tactile, and manual cueing to stand upright, bring pelvis forward, and maintain TDWB. Requires CGA with use of RW and good adherence to WB status to walk from bed to sink. Requires CGA to brush teeth with good adherence to WB status. Requires CGA to return to recliner chair. Pt very motivated and requires CGA with use of RW to complete sit <> stand transfer and walk from chair to hallway and return to increase activity tolerance for performance of household distances. Will continue with skilled OT efforts. SUBJECTIVE:   Ms. Cl Davison states, \"I will get better quicker the more I move. \"    SOCIAL HISTORY/LIVING ENVIRONMENT: Lives alone in one story home with ramp to enter from back of home. Reports that her sister will be coming to stay with her following d/c. Mod I for bathing with use of SC for increased stability when standing. Independent for functional mobility but reports she does have cane if needed. Drives. 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 OBJECTIVE:     PAIN: VITAL SIGNS: LINES/DRAINS:   Pre Treatment: Pain Screen  Pain Scale 1: Numeric (0 - 10)  Pain Intensity 1: 4  Pain Onset 1: post op  Pain Location 1: Hip  Pain Orientation 1: Right  Pain Description 1: Aching  Post Treatment: Unchanged  Initially on 3L O2 with O2 sats at 94%. O2 doffed as pt does not wear O2 at baseline. Desaturates to 87% following functional mobility on room air. O2 donned and set to 2L.  Pt with minimal desaturation to 89% following functional mobility. Improves to 92% with use of pursed-lip breathing and increased rest break. Canales Catheter  O2 Device: Nasal cannula     GROSS EVALUATION:  BUE Within Functional Limits Abnormal/ Functional Abnormal/ Non-Functional (see comments) Not Tested Comments:   AROM [x] [] [] []    PROM [] [] [] [x]    Strength [x] [] [] []    Balance [] [x] [] []    Posture [] [x] [] []    Sensation [x] [] [] []    Coordination [x] [] [] []    Tone [x] [] [] []    Edema [x] [] [] []    Activity Tolerance [] [x] [] []     [] [] [] []      COGNITION/  PERCEPTION: Intact Impaired   (see comments) Comments:   Orientation [x] []    Vision [x] []    Hearing [x] []    Judgment/ Insight [x] []    Attention [x] []    Memory [x] []    Command Following [x] []    Emotional Regulation [x] []     [] []      ACTIVITIES OF DAILY LIVING: I Mod I S SBA CGA Min Mod Max Total NT Comments   BASIC ADLs:              Bathing/ Showering [] [] [] [] [] [] [] [] [] [x]    Toileting [] [] [] [] [] [] [] [] [] [x]    Dressing [] [] [] [] [] [] [] [] [] [x]    Feeding [] [] [] [] [] [] [] [] [] [x]    Grooming [] [] [] [] [x] [] [] [] [] [] To brush teeth standing at sink. Personal Device Care [] [] [] [] [] [] [] [] [] [x]    Functional Mobility [] [] [] [] [x] [x] [] [] [] [] Min A initial sit to stand. Progresses to CGA as session progresses.    I=Independent, Mod I=Modified Independent, S=Supervision, SBA=Standby Assistance, CGA=Contact Guard Assistance,   Min=Minimal Assistance, Mod=Moderate Assistance, Max=Maximal Assistance, Total=Total Assistance, NT=Not Tested    MOBILITY: I Mod I S SBA CGA Min Mod Max Total  NT x2 Comments:   Supine to sit [] [] [] [x] [] [] [] [] [] [] []    Sit to supine [] [] [] [] [] [] [] [] [] [x] []    Sit to stand [] [] [] [] [x] [x] [] [] [] [] [] X RW   Bed to chair [] [] [] [] [x] [] [] [] [] [] [] X RW   I=Independent, Mod I=Modified Independent, S=Supervision, SBA=Standby Assistance, CGA=Contact Guard Assistance,   Min=Minimal Assistance, Mod=Moderate Assistance, Max=Maximal Assistance, Total=Total Assistance, NT=Not [facility] 6 Clicks   Daily Activity Inpatient Short Form        How much help from another person does the patient currently need. .. Total A Lot A Little None   1. Putting on and taking off regular lower body clothing? [] 1   [x] 2   [] 3   [] 4   2. Bathing (including washing, rinsing, drying)? [] 1   [x] 2   [] 3   [] 4   3. Toileting, which includes using toilet, bedpan or urinal?   [] 1   [] 2   [x] 3   [] 4   4. Putting on and taking off regular upper body clothing? [] 1   [] 2   [x] 3   [] 4   5. Taking care of personal grooming such as brushing teeth? [] 1   [] 2   [x] 3   [] 4   6. Eating meals? [] 1   [] 2   [] 3   [x] 4   © 2007, Trustees of [address] [place] [address] [address] [id number], under license to [employer]. All rights reserved     Score:  Initial: 17, completed, 6/20/2021 Most Recent: X (Date: -- )   Interpretation of Tool:  Represents activities that are increasingly more difficult (i.e. Bed mobility, Transfers, Gait). PLAN:   FREQUENCY/DURATION: OT Plan of Care: 6 times/week for duration of hospital stay or until stated goals are met, whichever comes first.    PROBLEM LIST:   (Skilled intervention is medically necessary to address:)  1. Decreased ADL/Functional Activities  2. Decreased Activity Tolerance  3. Decreased AROM/PROM  4. Decreased Balance  5. Decreased Coordination  6. Decreased Gait Ability  7. Decreased Strength  8. Decreased Transfer Abilities  9. Increased Pain   INTERVENTIONS PLANNED:   (Benefits and precautions of occupational therapy have been discussed with the patient.)  1. Self Care Training  2. Therapeutic Activity  3. Therapeutic Exercise/HEP  4. Neuromuscular Re-education  5.  Education     TREATMENT:     EVALUATION: Low Complexity : (Untimed Charge)    TREATMENT:   Co-Treatment PT/OT necessary due to patient's decreased overall endurance/tolerance levels, as well as need for high level skilled assistance to complete functional transfers/mobility and functional tasks  Self Care (8 Minutes): Self care including Grooming to increase independence and decrease level of assistance required. Neuromuscular Re-education (15 Minutes): Neuromuscular Re-education included Balance Training, Coordination training, Postural training and Standing balance training to improve Balance, Coordination and Postural Control. TREATMENT GRID:  N/A    AFTER TREATMENT POSITION/PRECAUTIONS:  Chair, Needs within reach, RN notified and table tray adjacent.     INTERDISCIPLINARY COLLABORATION:  RN/PCT, PT/PTA and OT/COLLINS    TOTAL TREATMENT DURATION:  OT Patient Time In/Time Out  Time In: 0854  Time Out: 0921    JOHN Barksdale/PETE

## 2021-06-20 NOTE — PROGRESS NOTES
2021         Post Op day: 1 Day Post-Op     Admit Date: 2021  Admit Diagnosis: Closed right hip fracture, initial encounter ([facility] [address] [S72.001A]  Hip Percutaneous Pinning Cannulated Screws Right - Right  2021    Subjective: Doing well, No complaints, No SOB, No Chest Pain, No Nausea or Vomitting. PT/OT:         Activity Response: Poorly tolerated  Assistive Device: Fall prevention device                Weight Bearing Status: WBAT  BMP:  Recent Labs     21  0452 21  1057   CREA 0.40* 0.58*   BUN 9 16    141   K 3.8 3.8   * 111*   CO2 25 25   AGAP 5* 5*   GLU 92 103*     Patient Vitals for the past 8 hrs:   BP Temp Pulse Resp SpO2   21 0817 (!) 157/83 98.4 °F (36.9 °C) 93 18 99 %   21 0439 (!) 146/88 97.5 °F (36.4 °C) 88 18 94 %     Temp (24hrs), Av.7 °F (36.5 °C), Min:97.5 °F (36.4 °C), Max:98.4 °F (36.9 °C)    CBC:  Recent Labs     21  0452 21  1057   WBC 10.1 10.0   GRANS  --  68   MONOS  --  10   EOS  --  1   ANEU  --  6.8   ABL  --  2.1   HGB 13.3 14.3   HCT 40.8 44.1    192       Microbiology:     All Micro Results     None        Objective: Vital Signs are Stable, No Acute Distress, Alert and Oriented  Dressing is Dry,  Neurovascular exam is normal.    Assessment:  Patient Active Problem List   Diagnosis Code    Osteoarthritis M19.90    Status post total right knee replacement Z96.651    Fracture of femoral neck, right, closed ([facility] [address] S72.001A    Essential hypertension I10       Plan: Continue Physical Therapy  Monitor Hbg and labs. Continue to monitor.   Will round tomorrow   Dispo pending will need PT clearance and case management     Signed By: KIP Lopez

## 2021-06-20 NOTE — PROGRESS NOTES
[facility]ist Note     Admit Date:  2021  9:52 AM   Name:  Merrick Kaplan   Age:  72 y.o.  :  1950   MRN:  [id number]   PCP:  Jose Vasquez MD  Treatment Team: Attending Provider: Queta Patel MD; Consulting Provider: Paola Russo MD; Charge Nurse: Nixon Mascorro; Occupational Therapist: Anne Dorset; Physical Therapist: Leonora Sosa, PT    HPI/Subjective:     Merrick Kaplan is a 72 y.o. female with hx of HTN, OA, s/p R TKA presented to ER with right hip pain and difficulty in walking following a mechanical fall, admitted for closed right femoral subcapital neck fracture. Patient is status post repair on .    : Patient is seen at the bedside. Postop day 1. Tolerated the procedure well. Reports pain better controlled. Denies chest pain, palpitation, nausea, vomiting or abdominal pain. Patient does not want to go to rehab on discharge. She would like to go home with physical therapy and states that worked well at the time of knee replacement. Assessment and Plan:     Close right femoral subcapital neck fracture status post repair on :  Postop day 1  Ortho following  Pain control  Flexeril 3 times daily for muscle spasms  DVT prophylaxis as per Ortho  PT/OT  Discontinue Canales and IV fluids    Hypertension:  Blood pressure is suboptimally controlled, pain could be a contributing factor  Optimizing pain control  Increase lisinopril to 20 mg daily  Hydralazine as needed systolic blood pressure more than 180    Discharge planning: PT/OT eval.  Patient does not want to go to rehab on discharge.   Plan to discharge tomorrow with home health    DVT ppx ordered  Code status:  Full  Estimated LOS:  Greater than 2 midnights  Risk:  high    Hospital Problems as of 2021 Date Reviewed: 3/9/2020        Codes Class Noted - Resolved POA    * (Principal) F[facility] femoral neck, right, [facility] ICD-10-CM: S72.001A  ICD-9-CM: 820.8  2021 - Present Essential hypertension ICD-10-CM: I10  ICD-9-CM: 401.9  2021 - Present Unknown        Osteoarthritis ICD-10-CM: M19.90  ICD-9-CM: 715.90  3/18/2020 - Present Yes        Status post total right knee replacement ICD-10-CM: S64.283  ICD-9-CM: V43.65  3/18/2020 - Present Yes                10 systems reviewed and negative except as noted in HPI. Past Medical History:   Diagnosis Date    Adverse effect of anesthesia     \"hard to knock me out sometimes\"    Arthritis     Claustrophobia     mild    Former cigarette smoker     GERD (gastroesophageal reflux disease)     occassionally-managed with PRN OTC meds    History of melanoma     X2    Hypertension     managed with medication    Ureteral stone       Past Surgical History:   Procedure Laterality Date    HX CATARACT REMOVAL Bilateral 2018,2019    HX LITHOTRIPSY  2020    HX OTHER SURGICAL      melanoma removed from RLE     HX TUBAL LIGATION      2019-\"40 yrs ago\"      Allergies   Allergen Reactions    Codeine Nausea and Vomiting     Increased heart rate      Social History     Tobacco Use    Smoking status: Former Smoker     Packs/day: 0.25     Years: 20.00     Pack years: 5.00     Quit date: 2019     Years since quittin.0    Smokeless tobacco: Never Used   Substance Use Topics    Alcohol use: Yes     Comment: rarely      Family History   Problem Relation Age of Onset    Cancer Mother         Bone CA    Heart Disease Mother     Heart Disease Father       Family history reviewed and noncontributory. There is no immunization history for the selected administration types on file for this patient. PTA Medications:  Prior to Admission Medications   Prescriptions Last Dose Informant Patient Reported? Taking? HYDROmorphone (DILAUDID) 2 mg tablet   Yes No   Sig: Take 2 mg by mouth daily as needed for Pain. ascorbic acid, vitamin C, (VITAMIN C) 250 mg tablet   Yes No   Sig: Take 750 mg by mouth daily.    buPROPion XL (WELLBUTRIN XL) 150 mg tablet   Yes No   Sig: Take 150 mg by mouth two (2) times a day. carisoprodol (SOMA) 350 mg tablet   Yes No   Sig: Take 350 mg by mouth nightly as needed for Muscle Spasm(s). cholecalciferol, vitamin D3, (VITAMIN D3 PO)   Yes No   Sig: Take 1 Tab by mouth two (2) times a day. diphenhydrAMINE-acetaminophen (Tylenol PM Extra Strength)  mg tab   Yes No   Sig: Take 1-2 Tabs by mouth every four to six (4-6) hours as needed for Pain. folic acid/multivit-min/lutein (CENTRUM SILVER PO)   Yes No   Sig: Take 1 Tab by mouth daily. ginkgo biloba (GINKOBA PO)   Yes No   Sig: Take 120 mg by mouth two (2) times a day. hyoscyamine SL (LEVSIN/SL) 0.125 mg SL tablet   No No   Si Tab by SubLINGual route every four (4) hours as needed for Cramping (bladder spasms). ibuprofen (AdviL) 200 mg tablet   Yes No   Sig: Take 400 mg by mouth two (2) times a day. lisinopril (PRINIVIL, ZESTRIL) 10 mg tablet   Yes No   Sig: Take 10 mg by mouth nightly. meclizine (ANTIVERT) 25 mg tablet   Yes No   Sig: Take 25 mg by mouth daily as needed for Dizziness. polyethylene glycol (MIRALAX) 17 gram packet   Yes No   Sig: Take 17 g by mouth daily. mix with 6 oz of fluids   turmeric 400 mg cap   Yes No   Sig: Take 1 Tab by mouth daily.       Facility-Administered Medications: None       Objective:     Patient Vitals for the past 24 hrs:   Temp Pulse Resp BP SpO2   21 0817 98.4 °F (36.9 °C) 93 18 (!) 157/83 99 %   21 0439 97.5 °F (36.4 °C) 88 18 (!) 146/88 94 %   21 2355 97.9 °F (36.6 °C) 77 18 (!) 156/95 95 %   21 1958 97.7 °F (36.5 °C) 95 18 (!) 143/97 91 %   21 1808 97.5 °F (36.4 °C) 72 18 (!) 151/94 93 %   21 1723  84 (!) 52  94 %   21 1717  81 14 (!) 148/75 94 %   21 1713  78 15 (!) 153/90 94 %   21 1710     94 %   21 1708 97.5 °F (36.4 °C) 82 16 (!) 171/93 94 %   21 1656  85 12 (!) 171/90 92 %   21 1648  86 29 (!) 170/96 91 %   06/19/21 1642  81 19 (!) 179/83 92 %   06/19/21 1638  87 27 (!) 174/81 92 %   06/19/21 1636 97.6 °F (36.4 °C) 84 20 (!) 188/93 95 %   06/19/21 1506 97.6 °F (36.4 °C) 85 18 (!) 185/103 96 %   06/19/21 1307     95 %   06/19/21 1305  87 16 (!) 138/90 90 %     Oxygen Therapy  O2 Sat (%): 99 % (06/20/21 0817)  Pulse via Oximetry: 80 beats per minute (06/19/21 1723)  O2 Device: Nasal cannula (06/19/21 1708)  O2 Flow Rate (L/min): 2 l/min (06/19/21 1708)    Estimated body mass index is 28.89 kg/m² as calculated from the following:    Height as of this encounter: 5' 6\" (1.676 m). Weight as of this encounter: 81.2 kg (179 lb). Intake/Output Summary (Last 24 hours) at 6/20/2021 1034  Last data filed at 6/20/2021 4641  Gross per 24 hour   Intake 1090 ml   Output 1450 ml   Net -360 ml       *Note that automatically entered I/Os may not be accurate; dependent on patient compliance with collection and accurate  by assistants. Physical Exam:  General:    Well nourished. Alert. Eyes:   Normal sclerae. Extraocular movements intact. HENT:  Normocephalic, atraumatic. Moist mucous membranes  CV:   RRR. No m/r/g. Lungs:  CTAB. No wheezing, rhonchi, or rales. Abdomen: Soft, nontender, nondistended. Extremities: Warm and dry. No cyanosis or edema. Status post right hip repair  Neurologic: CN II-XII grossly intact. Sensation intact. Skin:     No rashes or jaundice. Normal coloration  Psych:  Normal mood and affect. I reviewed the labs, imaging, EKGs, telemetry, and other studies done this admission.   Data Reviewed:   Recent Results (from the past 24 hour(s))   EKG, 12 LEAD, INITIAL    Collection Time: 06/19/21 10:49 AM   Result Value Ref Range    Ventricular Rate 86 BPM    Atrial Rate 86 BPM    P-R Interval 146 ms    QRS Duration 98 ms    Q-T Interval 372 ms    QTC Calculation (Bezet) 445 ms    Calculated P Axis 49 degrees    Calculated R Axis -24 degrees    Calculated T Axis 53 degrees    Diagnosis       Normal sinus rhythm  Normal ECG  No previous ECGs available  Confirmed by Yumiko Rodriges MD (), John MOJICA ([id number]) on 6/20/2021 8:13:14 AM     CBC WITH AUTOMATED DIFF    Collection Time: 06/19/21 10:57 AM   Result Value Ref Range    WBC 10.0 4.3 - 11.1 K/uL    RBC 4.82 4.05 - 5.2 M/uL    HGB 14.3 11.7 - 15.4 g/dL    HCT 44.1 35.8 - 46.3 %    MCV 91.5 79.6 - 97.8 FL    MCH 29.7 26.1 - 32.9 PG    MCHC 32.4 31.4 - 35.0 g/dL    RDW 13.8 11.9 - 14.6 %    PLATELET 261 828 - 380 K/uL    MPV 11.2 9.4 - 12.3 FL    ABSOLUTE NRBC 0.00 0.0 - 0.2 K/uL    DF AUTOMATED      NEUTROPHILS 68 43 - 78 %    LYMPHOCYTES 21 13 - 44 %    MONOCYTES 10 4.0 - 12.0 %    EOSINOPHILS 1 0.5 - 7.8 %    BASOPHILS 1 0.0 - 2.0 %    IMMATURE GRANULOCYTES 0 0.0 - 5.0 %    ABS. NEUTROPHILS 6.8 1.7 - 8.2 K/UL    ABS. LYMPHOCYTES 2.1 0.5 - 4.6 K/UL    ABS. MONOCYTES 1.0 0.1 - 1.3 K/UL    ABS. EOSINOPHILS 0.1 0.0 - 0.8 K/UL    ABS. BASOPHILS 0.1 0.0 - 0.2 K/UL    ABS. IMM. GRANS. 0.0 0.0 - 0.5 K/UL   METABOLIC PANEL, COMPREHENSIVE    Collection Time: 06/19/21 10:57 AM   Result Value Ref Range    Sodium 141 136 - 145 mmol/L    Potassium 3.8 3.5 - 5.1 mmol/L    Chloride 111 (H) 98 - 107 mmol/L    CO2 25 21 - 32 mmol/L    Anion gap 5 (L) 7 - 16 mmol/L    Glucose 103 (H) 65 - 100 mg/dL    BUN 16 8 - 23 MG/DL    Creatinine 0.58 (L) 0.6 - 1.0 MG/DL    GFR est AA >60 >60 ml/min/1.73m2    GFR est non-AA >60 >60 ml/min/1.73m2    Calcium 9.0 8.3 - 10.4 MG/DL    Bilirubin, total 0.8 0.2 - 1.1 MG/DL    ALT (SGPT) 28 12 - 65 U/L    AST (SGOT) 22 15 - 37 U/L    Alk.  phosphatase 133 50 - 136 U/L    Protein, total 7.2 6.3 - 8.2 g/dL    Albumin 3.7 3.2 - 4.6 g/dL    Globulin 3.5 2.3 - 3.5 g/dL    A-G Ratio 1.1 (L) 1.2 - 3.5     PROTHROMBIN TIME + INR    Collection Time: 06/19/21 10:57 AM   Result Value Ref Range    Prothrombin time 12.8 12.5 - 14.7 sec    INR 0.9     TYPE & SCREEN    Collection Time: 06/19/21 10:57 AM   Result Value Ref Range Crossmatch Expiration 06/22/2021,2356     ABO/Rh(D) O POSITIVE     Antibody screen NEG    METABOLIC PANEL, COMPREHENSIVE    Collection Time: 06/20/21  4:52 AM   Result Value Ref Range    Sodium 142 136 - 145 mmol/L    Potassium 3.8 3.5 - 5.1 mmol/L    Chloride 112 (H) 98 - 107 mmol/L    CO2 25 21 - 32 mmol/L    Anion gap 5 (L) 7 - 16 mmol/L    Glucose 92 65 - 100 mg/dL    BUN 9 8 - 23 MG/DL    Creatinine 0.40 (L) 0.6 - 1.0 MG/DL    GFR est AA >60 >60 ml/min/1.73m2    GFR est non-AA >60 >60 ml/min/1.73m2    Calcium 8.3 8.3 - 10.4 MG/DL    Bilirubin, total 0.7 0.2 - 1.1 MG/DL    ALT (SGPT) 21 12 - 65 U/L    AST (SGOT) 15 15 - 37 U/L    Alk.  phosphatase 120 50 - 136 U/L    Protein, total 6.5 6.3 - 8.2 g/dL    Albumin 3.1 (L) 3.2 - 4.6 g/dL    Globulin 3.4 2.3 - 3.5 g/dL    A-G Ratio 0.9 (L) 1.2 - 3.5     MAGNESIUM    Collection Time: 06/20/21  4:52 AM   Result Value Ref Range    Magnesium 1.7 (L) 1.8 - 2.4 mg/dL   CBC W/O DIFF    Collection Time: 06/20/21  4:52 AM   Result Value Ref Range    WBC 10.1 4.3 - 11.1 K/uL    RBC 4.47 4.05 - 5.2 M/uL    HGB 13.3 11.7 - 15.4 g/dL    HCT 40.8 35.8 - 46.3 %    MCV 91.3 79.6 - 97.8 FL    MCH 29.8 26.1 - 32.9 PG    MCHC 32.6 31.4 - 35.0 g/dL    RDW 13.9 11.9 - 14.6 %    PLATELET 737 249 - 726 K/uL    MPV 10.9 9.4 - 12.3 FL    ABSOLUTE NRBC 0.00 0.0 - 0.2 K/uL       All Micro Results     None          Current Facility-Administered Medications   Medication Dose Route Frequency    buPROPion SR (WELLBUTRIN SR) tablet 150 mg  150 mg Oral BID    lisinopriL (PRINIVIL, ZESTRIL) tablet 10 mg  10 mg Oral QHS    acetaminophen (TYLENOL) tablet 650 mg  650 mg Oral Q6H PRN    polyethylene glycol (MIRALAX) packet 17 g  17 g Oral DAILY PRN    ondansetron (ZOFRAN ODT) tablet 4 mg  4 mg Oral Q8H PRN    Or    ondansetron (ZOFRAN) injection 4 mg  4 mg IntraVENous Q6H PRN    enoxaparin (LOVENOX) injection 40 mg  40 mg SubCUTAneous Q24H    potassium chloride 10 mEq in 100 ml IVPB  10 mEq IntraVENous PRN    magnesium sulfate 2 g/50 ml IVPB (premix or compounded)  2 g IntraVENous PRN    famotidine (PEPCID) tablet 20 mg  20 mg Oral BID    calcium carbonate (OS-ABHI) tablet 500 mg [elemental]  500 mg Oral TID WITH MEALS    cyclobenzaprine (FLEXERIL) tablet 5 mg  5 mg Oral TID    hydrALAZINE (APRESOLINE) 20 mg/mL injection 10 mg  10 mg IntraVENous Q6H PRN    butalbital-acetaminophen-caffeine (FIORICET, ESGIC) -40 mg per tablet 1 Tablet  1 Tablet Oral Q6H PRN    alcohol 62% (NOZIN) nasal  1 Ampule  1 Ampule Topical Q12H    sodium chloride (NS) flush 5-40 mL  5-40 mL IntraVENous Q8H    sodium chloride (NS) flush 5-40 mL  5-40 mL IntraVENous PRN    acetaminophen (TYLENOL) tablet 1,000 mg  1,000 mg Oral Q6H    naloxone (NARCAN) injection 0.2-0.4 mg  0.2-0.4 mg IntraVENous Q10MIN PRN    dexamethasone (DECADRON) 10 mg/mL injection 10 mg  10 mg IntraVENous ONCE    diphenhydrAMINE (BENADRYL) capsule 25 mg  25 mg Oral Q4H PRN    senna-docusate (PERICOLACE) 8.6-50 mg per tablet 2 Tablet  2 Tablet Oral DAILY    oxyCODONE IR (ROXICODONE) tablet 5 mg  5 mg Oral Q3H PRN    HYDROmorphone (DILAUDID) injection 1 mg  1 mg IntraVENous Q3H PRN    aspirin delayed-release tablet 325 mg  325 mg Oral DAILY       Other Studies:  No results found for this visit on 06/19/21. XR CHEST SNGL V    Result Date: 6/19/2021  CHEST X-RAY, one view. HISTORY:  Femur fracture. TECHNIQUE:  AP supine view COMPARISON: None. FINDINGS: Lungs: are clear. Costophrenic angles: are sharp. Heart size: is normal. Pulmonary vasculature: is unremarkable. Aorta: Unremarkable. Included portion of the upper abdomen: is unremarkable. Bones: No gross bony lesions. Other: None. Negative for acute change RIGHT FEMUR 2 view(s). HISTORY: Right knee pain following fall TECHNIQUE: AP and lateral views. COMPARISON: None. FINDINGS / IMPRESSION: Femoral shaft is intact. Status post knee arthroplasty.  Femoral neck not well evaluated due to obesity and projection. Dedicated hip films recommended. XR HIP RT DURING OR PROC    Result Date: 6/19/2021  RIGHT HIP, 4 views. HISTORY: Femoral neck fracture TECHNIQUE: AP view of the pelvis and coned down AP and frogleg lateral of the hip. FINDINGS: Initial 2 intraoperative images demonstrate the femoral neck fracture. Subsequent 2 images demonstrate 3 screws transfixing the femoral neck fracture. ORIF right femoral neck fracture. XR FEMUR RT 2 VS    Result Date: 6/19/2021  CHEST X-RAY, one view. HISTORY:  Femur fracture. TECHNIQUE:  AP supine view COMPARISON: None. FINDINGS: Lungs: are clear. Costophrenic angles: are sharp. Heart size: is normal. Pulmonary vasculature: is unremarkable. Aorta: Unremarkable. Included portion of the upper abdomen: is unremarkable. Bones: No gross bony lesions. Other: None. Negative for acute change RIGHT FEMUR 2 view(s). HISTORY: Right knee pain following fall TECHNIQUE: AP and lateral views. COMPARISON: None. FINDINGS / IMPRESSION: Femoral shaft is intact. Status post knee arthroplasty. Femoral neck not well evaluated due to obesity and projection. Dedicated hip films recommended. NC XR TECHNOLOGIST SERVICE    Result Date: 6/19/2021  EXAM:  NC XR TECHNOLOGIST SERVICE INDICATION:   Right hip pain COMPARISON: 6/19/2021. FINDINGS: An AP view of the pelvis demonstrates no fracture, lytic or blastic lesion or other abnormality. Patient is status post pinning of the right hip with 3 cannulated screws. Soft tissue air as expected. Vonzella Goodpasture Unremarkable pinning of the right hip with 3 cannulated screws.        SARS-CoV-2 Lab Results  \"Novel Coronavirus\" Test: No results found for: COV2NT   \"Emergent Disease\" Test: No results found for: EDPR  \"SARS-COV-2\" Test: No results found for: XGCOVT  Rapid Test: No results found for: COVR            Part of this note was written by using a voice dictation software and the note has been proof read but may still contain some grammatical/other typographical errors.     Signed:  Sean Rebolledo MD

## 2021-06-20 NOTE — PROGRESS NOTES
ACUTE PHYSICAL THERAPY GOALS:  (Developed with and agreed upon by patient and/or caregiver. )  LTG:  (1.)Ms. Saintclair Abo will move from supine to sit and sit to supine , scoot up and down and roll side to side in bed with INDEPENDENCE within 7 treatment day(s). (2.)Ms. Saintclair Abo will transfer from bed to chair and chair to bed with INDEPENDENCE using the least restrictive device within 7 treatment day(s). (3.)Ms. Saintclair Abo will ambulate with MODIFIED INDEPENDENCE for a minimum of 200 feet with the least restrictive device within 7 treatment day(s). TTWB RLE  PHYSICAL THERAPY: Daily Note and PM Treatment Day # 1    Liz Franz is a 72 y.o. female   PRIMARY DIAGNOSIS: Fracture of femoral neck, right, closed ([facility]  [facility] [S72.001A]  Procedure(s) (LRB):  HIP PERCUTANEOUS PINNING CANNULATED SCREWS RIGHT (Right)  1 Day Post-Op    ASSESSMENT:     REHAB RECOMMENDATIONS: CURRENT LEVEL OF FUNCTION:  (Most Recently Demonstrated)   Recommendation to date pending progress:  [facility] [place] [address] Therapy  Equipment:    Rolling Walker Bed Mobility:   Standby Assistance  Sit to Stand:  [employer] Assistance  Transfers:   Not tested  Gait/Mobility:   Standby Assistance     ASSESSMENT:  Ms. Saintclair Abo was able to ambulate 80' with RW while maintaining TTWB. Pt demonstrates increased stability and activity tolerance with mobility. She performed supine exercises in the bed with increased ROM with each rep and continued motivation to perform without assist. Continue to work on gait training and LE strengthening next visit. PT to continue to follow. SUBJECTIVE:   Ms. Saintclair Abo states, \"I'd like to walk again. \"    SOCIAL HISTORY/ LIVING ENVIRONMENT: Refer to eval     OBJECTIVE:     PAIN: VITAL SIGNS: LINES/DRAINS:   Pre Treatment: Pain Screen  Pain Scale 1: Numeric (0 - 10)  Pain Intensity 1: 4  Pain Onset 1: post op  Post Treatment: 5   none  O2 Device: Nasal cannula     MOBILITY: I Mod I S SBA CGA Min Mod Max Total  NT x2 Comments:   Bed Mobility    Rolling [] [] [] [] [] [] [] [] [] [x] []    Supine to Sit [] [] [] [x] [] [] [] [] [] [] []    Scooting [] [] [] [x] [] [] [] [] [] [] []    Sit to Supine [] [] [] [x] [] [] [] [] [] [] []    Transfers    Sit to Stand [] [] [] [x] [] [] [] [] [] [] []    Bed to Chair [] [] [] [] [] [] [] [] [] [x] []    Stand to Sit [] [] [] [x] [] [] [] [] [] [] []    I=Independent, Mod I=Modified Independent, S=Supervision, SBA=Standby Assistance, CGA=Contact Guard Assistance,   Min=Minimal Assistance, Mod=Moderate Assistance, Max=Maximal Assistance, Total=Total Assistance, NT=Not Tested    BALANCE: Good Fair+ Fair Fair- Poor NT Comments   Sitting Static [x] [] [] [] [] []    Sitting Dynamic [x] [] [] [] [] []              Standing Static [] [x] [] [] [] []    Standing Dynamic [] [] [x] [] [] []      GAIT: I Mod I S SBA CGA Min Mod Max Total  NT x2 Comments:   Level of Assistance [] [] [] [x] [] [] [] [] [] [] []    Distance 140'    DME Rolling Walker    Gait Quality Decreased stance time LLE, maintains WB    Weightbearing  Status TTWB     I=Independent, Mod I=Modified Independent, S=Supervision, SBA=Standby Assistance, CGA=Contact Guard Assistance,   Min=Minimal Assistance, Mod=Moderate Assistance, Max=Maximal Assistance, Total=Total Assistance, NT=Not Tested    PLAN:   FREQUENCY/DURATION: PT Plan of Care: BID for duration of hospital stay or until stated goals are met, whichever comes first.  TREATMENT:     TREATMENT:   ($$ Gait Trainin-22 mins     )  Therapeutic Exercise (10 Minutes): Therapeutic exercises noted below to improve functional activity tolerance, AROM, strength and mobility. Gait Training (10 Minutes): Gait training for 140 feet utilizing [facility]. Patient required Verbal cueing to improve Gait Mechanics.      TREATMENT GRID:     Date:  21 Date:   Date:     ACTIVITY/EXERCISE AM PM AM PM AM PM   AP  x15       SLR  2x10       Heel slides  2x10 ABD slides  2x10                                  B = bilateral; AA = active assistive; A = active; P = passive    AFTER TREATMENT POSITION/PRECAUTIONS:  Bed, Needs within reach and RN notified    INTERDISCIPLINARY COLLABORATION:  RN/PCT and PT/PTA    TOTAL TREATMENT DURATION:  PT Patient Time In/Time Out  Time In: 1337  Time Out: [address]

## 2021-06-20 NOTE — PROGRESS NOTES
ACUTE PHYSICAL THERAPY GOALS:  (Developed with and agreed upon by patient and/or caregiver. )  LTG:  (1.)Ms. Lilia Martinez will move from supine to sit and sit to supine , scoot up and down and roll side to side in bed with INDEPENDENCE within 7 treatment day(s). (2.)Ms. Lilia Martinez will transfer from bed to chair and chair to bed with INDEPENDENCE using the least restrictive device within 7 treatment day(s). (3.)Ms. Lilia Martinez will ambulate with MODIFIED INDEPENDENCE for a minimum of 200 feet with the least restrictive device within 7 treatment day(s).   ________________________________________________________________________________________________    TTWB in [facility]  PHYSICAL THERAPY ASSESSMENT: Initial Assessment, Daily Note and AM PT Treatment Day # 1      Terra Newberry is a 72 y.o. female   PRIMARY DIAGNOSIS: Fracture of femoral neck, right, closed ([facility] [address]  Closed right hip fracture, initial encounter ([facility] [address] [S72.001A]  Procedure(s) (LRB):  HIP PERCUTANEOUS PINNING CANNULATED SCREWS RIGHT (Right)  1 Day Post-Op  Reason for Referral:  R femoral neck fx surgical repair  ICD-10: Treatment Diagnosis: Difficulty in walking, Not elsewhere classified (R26.2)  Other abnormalities of gait and mobility (R26.89)  History of falling (Z91.81)  INPAT[place]: [place] / [place] B / Product Type: Medicare /     ASSESSMENT:     REHAB RECOMMENDATIONS:   Recommendation to date pending progress:  [address] [place] [address]  Equipment:    Rolling Walker     PRIOR LEVEL OF FUNCTION:  (Prior to Hospitalization) INITIAL/CURRENT LEVEL OF FUNCTION:  (Most Recently Demonstrated)   Bed Mobility:   Independent  Sit to Stand:   Independent  Transfers:   Independent  Gait/Mobility:   Independent Bed Mobility:   Standby Assistance  Sit to Stand:   Minimal Assistance  Transfers:   Contact Guard Assistance  Gait/Mobility:   Contact Guard Assistance     ASSESSMENT:  Ms. Lilia Martinez is a 72year old female s/p R femoral neck fx with surgical repair. Pt demonstrates some decreased BLE strength due to post operative state. She is able to perform all bed mobility with SBA and with VC for RW use demonstrates safety with transfers. Pt ambulated 76' with the RW while maintaining TTWB on the RLE. She is on 3L of O2 upon entering the room and asymptomatic with SpO2 measuring around 95%. With ambulation her O2 dropped to 88% on room air and 2L of O2 were provided so that the pt could maintain oxygen saturation in the 90s. Pt is presenting below her independent baseline at this time and would continue to benefit from skilled PT to facilitate improvements in functional mobility and maximize rehab potential.      SUBJECTIVE:   Ms. Yue Alegria states, \"I'm ready to go. \"    SOCIAL HISTORY/LIVING ENVIRONMENT: Pt lives alone in a single story home with 14 steps to enter and a ramp in the back. Her sister will be coming to stay with her post DC. 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 She is completely independent at baseline.       OBJECTIVE:     PAIN: VITAL SIGNS: LINES/DRAINS:   Pre Treatment: Pain Screen  Pain Scale 1: Numeric (0 - 10)  Pain Intensity 1: 4  Pain Onset 1: post op  Post Treatment: 4   Canales Catheter and IV  O2 Device: Nasal cannula     GROSS EVALUATION:   Within Functional Limits Abnormal/ Functional Abnormal/ Non-Functional (see comments) Not Tested Comments:   AROM [] [x] [] [] Decreased in RLE due to post operative state   PROM [] [] [] []    Strength [] [x] [] [] Decreased in RLE due to post operative state   Balance [] [x] [] []    Posture [x] [] [] []    Sensation [] [] [] []    Coordination [] [] [] []    Tone [] [] [] []    Edema [] [] [] []    Activity Tolerance [] [x] [] []     [] [] [] []      COGNITION/  PERCEPTION: Intact Impaired   (see comments) Comments:   Orientation [x] []    Vision [] [x] Wears glasses   Hearing [x] []    Command Following [x] []    Safety Awareness [x] []     [] []      MOBILITY: I Mod I S SBA CGA Min Mod Max Total  NT x2 Comments:   Bed Mobility    Rolling [] [] [] [] [] [] [] [] [] [x] []    Supine to Sit [] [] [] [x] [] [] [] [] [] [] []    Scooting [] [] [] [] [] [] [] [] [] [x] []    Sit to Supine [] [] [] [] [] [] [] [] [] [x] []    Transfers    Sit to Stand [] [] [] [] [x] [x] [] [] [] [] []    Bed to Chair [] [] [] [] [x] [] [] [] [] [] []    Stand to Sit [] [] [] [] [x] [] [] [] [] [] []    I=Independent, Mod I=Modified Independent, S=Supervision, SBA=Standby Assistance, CGA=Contact Guard Assistance,   Min=Minimal Assistance, Mod=Moderate Assistance, Max=Maximal Assistance, Total=Total Assistance, NT=Not Tested  GAIT: I Mod I S SBA CGA Min Mod Max Total  NT x2 Comments:   Level of Assistance [] [] [] [] [x] [] [] [] [] [] []    Distance 30', 76'    DME Rolling Walker    Gait Quality Antalgic gait pattern, step to with TTWB in the RLE    Weightbearing Status TTWB     I=Independent, Mod I=Modified Independent, S=Supervision, SBA=Standby Assistance, CGA=Contact Guard Assistance,   Min=Minimal Assistance, Mod=Moderate Assistance, Max=Maximal Assistance, Total=Total Assistance, NT=Not Tested    [facility] 6 Clicks   Basic Mobility Inpatient Short Form       How much difficulty does the patient currently have. .. Unable A Lot A Little None   1. Turning over in bed (including adjusting bedclothes, sheets and blankets)? [] 1   [] 2   [] 3   [x] 4   2. Sitting down on and standing up from a chair with arms ( e.g., wheelchair, bedside commode, etc.)   [] 1   [] 2   [x] 3   [] 4   3. Moving from lying on back to sitting on the side of the bed? [] 1   [] 2   [x] 3   [] 4   How much help from another person does the patient currently need. .. Total A Lot A Little None   4. Moving to and from a bed to a chair (including a wheelchair)? [] 1   [] 2   [x] 3   [] 4   5. Need to walk in hospital room? [] 1   [] 2   [x] 3   [] 4   6. Climbing 3-5 steps with a railing?    [x] 1 [] 2   [] 3   [] 4   © , Trustees of [address] [place] [address] [address] [address], under license to [employer]. All rights reserved     Score:  Initial: 17 Most Recent: X (Date: -- )    Interpretation of Tool:  Represents activities that are increasingly more difficult (i.e. Bed mobility, Transfers, Gait). PLAN:   FREQUENCY/DURATION: PT Plan of Care: BID for duration of hospital stay or until stated goals are met, whichever comes first.    PROBLEM LIST:   (Skilled intervention is medically necessary to address:)  1. Decreased ADL/Functional Activities  2. Decreased Activity Tolerance  3. Decreased AROM/PROM  4. Decreased Balance  5. Decreased Gait Ability  6. Decreased Strength  7. Decreased Transfer Abilities  8. Increased Pain   INTERVENTIONS PLANNED:   (Benefits and precautions of physical therapy have been discussed with the patient.)  1. Therapeutic Activity  2. Therapeutic Exercise/HEP  3. Neuromuscular Re-education  4. Gait Training  5. Manual Therapy  6. Education     TREATMENT:     EVALUATION: Low Complexity : (Untimed Charge)    TREATMENT:   ($$ Gait Trainin-22 mins  $$ Therapeutic Activity: 8-22 mins    )  Therapeutic Activity (8 Minutes): Therapeutic activity included Supine to Sit, Scooting, Transfer Training, Sitting balance  and Standing balance to improve functional Mobility, Strength, ROM and Activity tolerance. Gait Training (15 Minutes): Gait training for 30,75 feet utilizing [facility]. Patient required Verbal cueing to improve Gait Mechanics.      TREATMENT GRID:  N/A    AFTER TREATMENT POSITION/PRECAUTIONS:  Chair and Needs within reach    INTERDISCIPLINARY COLLABORATION:  RN/PCT, PT/PTA and OT/COLLINS    TOTAL TREATMENT DURATION:  PT Patient Time In/Time Out  Time In: 1041  Time Out: [address] [place]

## 2021-06-21 VITALS
BODY MASS INDEX: 28.77 KG/M2 | HEART RATE: 92 BPM | WEIGHT: 179 LBS | RESPIRATION RATE: 18 BRPM | SYSTOLIC BLOOD PRESSURE: 162 MMHG | HEIGHT: 66 IN | OXYGEN SATURATION: 94 % | TEMPERATURE: 98.2 F | DIASTOLIC BLOOD PRESSURE: 80 MMHG

## 2021-06-21 LAB
ALBUMIN SERPL-MCNC: 3.1 G/DL (ref 3.2–4.6)
ALBUMIN/GLOB SERPL: 0.9 {RATIO} (ref 1.2–3.5)
ALP SERPL-CCNC: 110 U/L (ref 50–136)
ALT SERPL-CCNC: 22 U/L (ref 12–65)
ANION GAP SERPL CALC-SCNC: 5 MMOL/L (ref 7–16)
AST SERPL-CCNC: 16 U/L (ref 15–37)
BILIRUB SERPL-MCNC: 0.6 MG/DL (ref 0.2–1.1)
BUN SERPL-MCNC: 11 MG/DL (ref 8–23)
CALCIUM SERPL-MCNC: 8.7 MG/DL (ref 8.3–10.4)
CHLORIDE SERPL-SCNC: 108 MMOL/L (ref 98–107)
CO2 SERPL-SCNC: 26 MMOL/L (ref 21–32)
CREAT SERPL-MCNC: 0.41 MG/DL (ref 0.6–1)
ERYTHROCYTE [DISTWIDTH] IN BLOOD BY AUTOMATED COUNT: 13.6 % (ref 11.9–14.6)
GLOBULIN SER CALC-MCNC: 3.5 G/DL (ref 2.3–3.5)
GLUCOSE SERPL-MCNC: 107 MG/DL (ref 65–100)
HCT VFR BLD AUTO: 40 % (ref 35.8–46.3)
HGB BLD-MCNC: 13.1 G/DL (ref 11.7–15.4)
MAGNESIUM SERPL-MCNC: 1.9 MG/DL (ref 1.8–2.4)
MCH RBC QN AUTO: 29.7 PG (ref 26.1–32.9)
MCHC RBC AUTO-ENTMCNC: 32.8 G/DL (ref 31.4–35)
MCV RBC AUTO: 90.7 FL (ref 79.6–97.8)
NRBC # BLD: 0 K/UL (ref 0–0.2)
PLATELET # BLD AUTO: 189 K/UL (ref 150–450)
PMV BLD AUTO: 11.3 FL (ref 9.4–12.3)
POTASSIUM SERPL-SCNC: 4 MMOL/L (ref 3.5–5.1)
PROT SERPL-MCNC: 6.6 G/DL (ref 6.3–8.2)
RBC # BLD AUTO: 4.41 M/UL (ref 4.05–5.2)
SODIUM SERPL-SCNC: 139 MMOL/L (ref 136–145)
WBC # BLD AUTO: 8.7 K/UL (ref 4.3–11.1)

## 2021-06-21 PROCEDURE — 74011250637 HC RX REV CODE- 250/637: Performed by: HOSPITALIST

## 2021-06-21 PROCEDURE — 74011250636 HC RX REV CODE- 250/636: Performed by: ORTHOPAEDIC SURGERY

## 2021-06-21 PROCEDURE — 74011250636 HC RX REV CODE- 250/636: Performed by: HOSPITALIST

## 2021-06-21 PROCEDURE — 80053 COMPREHEN METABOLIC PANEL: CPT

## 2021-06-21 PROCEDURE — 74011250637 HC RX REV CODE- 250/637: Performed by: ORTHOPAEDIC SURGERY

## 2021-06-21 PROCEDURE — 85027 COMPLETE CBC AUTOMATED: CPT

## 2021-06-21 PROCEDURE — 83735 ASSAY OF MAGNESIUM: CPT

## 2021-06-21 PROCEDURE — 97530 THERAPEUTIC ACTIVITIES: CPT

## 2021-06-21 PROCEDURE — 36415 COLL VENOUS BLD VENIPUNCTURE: CPT

## 2021-06-21 RX ORDER — OXYCODONE HYDROCHLORIDE 5 MG/1
5 TABLET ORAL
Qty: 12 TABLET | Refills: 0 | Status: SHIPPED | OUTPATIENT
Start: 2021-06-21 | End: 2021-06-21

## 2021-06-21 RX ORDER — CYCLOBENZAPRINE HCL 10 MG
5 TABLET ORAL 2 TIMES DAILY
Qty: 3 TABLET | Refills: 0 | Status: SHIPPED | OUTPATIENT
Start: 2021-06-21 | End: 2021-06-24

## 2021-06-21 RX ORDER — CALCIUM CARBONATE 500(1250)
1 TABLET ORAL 2 TIMES DAILY
Qty: 60 TABLET | Refills: 0 | Status: SHIPPED | OUTPATIENT
Start: 2021-06-21 | End: 2021-07-21

## 2021-06-21 RX ORDER — FAMOTIDINE 20 MG/1
20 TABLET, FILM COATED ORAL 2 TIMES DAILY
Qty: 60 TABLET | Refills: 0 | Status: SHIPPED | OUTPATIENT
Start: 2021-06-21 | End: 2021-07-21

## 2021-06-21 RX ORDER — ASPIRIN 325 MG
325 TABLET, DELAYED RELEASE (ENTERIC COATED) ORAL DAILY
Qty: 30 TABLET | Refills: 0 | Status: SHIPPED | OUTPATIENT
Start: 2021-06-22 | End: 2021-07-22

## 2021-06-21 RX ORDER — HYDROMORPHONE HYDROCHLORIDE 2 MG/1
2 TABLET ORAL
Qty: 12 TABLET | Refills: 0 | Status: SHIPPED | OUTPATIENT
Start: 2021-06-21 | End: 2021-06-24

## 2021-06-21 RX ORDER — LISINOPRIL 10 MG/1
20 TABLET ORAL DAILY
Qty: 60 TABLET | Refills: 0 | Status: SHIPPED | OUTPATIENT
Start: 2021-06-21 | End: 2021-07-21

## 2021-06-21 RX ORDER — AMOXICILLIN 250 MG
2 CAPSULE ORAL DAILY
Qty: 10 TABLET | Refills: 0 | Status: SHIPPED | OUTPATIENT
Start: 2021-06-22 | End: 2021-06-27

## 2021-06-21 RX ADMIN — HYDRALAZINE HYDROCHLORIDE 10 MG: 20 INJECTION, SOLUTION INTRAMUSCULAR; INTRAVENOUS at 04:40

## 2021-06-21 RX ADMIN — Medication 10 ML: at 05:27

## 2021-06-21 RX ADMIN — Medication 500 MG: at 08:25

## 2021-06-21 RX ADMIN — FAMOTIDINE 20 MG: 20 TABLET ORAL at 08:25

## 2021-06-21 RX ADMIN — HYDROMORPHONE HYDROCHLORIDE 1 MG: 1 INJECTION, SOLUTION INTRAMUSCULAR; INTRAVENOUS; SUBCUTANEOUS at 05:27

## 2021-06-21 RX ADMIN — SENNOSIDES AND DOCUSATE SODIUM 2 TABLET: 8.6; 5 TABLET ORAL at 08:25

## 2021-06-21 RX ADMIN — ACETAMINOPHEN 1000 MG: 500 TABLET ORAL at 05:27

## 2021-06-21 RX ADMIN — ASPIRIN 325 MG: 325 TABLET, COATED ORAL at 08:26

## 2021-06-21 RX ADMIN — ACETAMINOPHEN 650 MG: 325 TABLET ORAL at 10:18

## 2021-06-21 RX ADMIN — Medication 1 AMPULE: at 08:26

## 2021-06-21 NOTE — DISCHARGE INSTRUCTIONS
[facility]      Patient Discharge Instructions    Hugojosephine Ashlyn / [id number] : 1950    Admitted 2021 Discharged: 2021     IF YOU HAVE ANY PROBLEMS ONCE YOU ARE AT HOME CALL THE FOLLOWING NUMBERS:   Main office number: [phone] ask for Nadya Dean (medical assistant with Dr. Kareem Casey)  Office Address: [facility] [address] [facility] [address] [address] [facility], [address] [facility]        Weight bearing status: toe down right  Leg         Diet  · Resume regular diet  · If experiencing nausea and vomiting, call your doctor. Medications    · The medications you are to continue on are listed on the medication reconciliation sheet. · Narcotic pain medications as well as supplemental iron can cause constipation. If this occurs try stopping the narcotic pain medication and/or the iron. · It is important that you take the medication exactly as they are prescribed. · Keep your medication in the bottles provided by the pharmacist and keep a list of the medication names, dosages, and times to be taken in your wallet. · Do not take other medications without consulting your doctor. Other Important Information    Do NOT smoke as this will greatly increase your risk of infection! Do not drive and operate hazardous machinery until being cleared to do so by your doctor     Swelling and warmth is normal for 6 months after surgery. If you experience swelling in your leg elevate you leg while laying down with your toes above your heart. If you have sudden onset severe swelling with leg pain call our office. The stitches deep inside take approximately 6 months to dissolve. There will be sharp shooting, stinging and burning pain. This is normal and will resolve between 3-6 months after surgery. Difficulty sleeping is normal following surgery. You may try melatonin, an over-the-counter sleep aid or benadryl to help with sleep. Most patients will resume sleeping through the night 5-8 weeks after surgery.      Home Physical Therapy is arranged. Home Health will contact you within 48 hrs of discharge that you have chosen. If you have not received a call within this time frame please contact that provider you chose. You should be given this information before you leave the hospital.     You are at a risk for falls. Use the rolling walker when walking. Patients should not drive if they are still taking narcotic pain mediation during the daytime hours. Most patients wean themselves off of pain medication within 2-5 weeks after surgery. Please give a list of your current medications to your Primary Care Provider and update this list whenever your medications are discontinued, doses are changed, or new medications (including over-the-counter products) are added. Please carry medication information at all times in case of emergency situations. You may be retaining fluid if you have a history of heart failure or if you experience any of the following symptoms:  Weight gain of 3 pounds or more overnight or 5 pounds in a week, increased swelling in our hands or feet or shortness of breath while lying flat in bed. Please call your doctor as soon as you notice any of these symptoms; do not wait until your next office visit. If you have sleep apnea and have a CPAP machine, please use it for all naps and sleeping. When to Call the office    - If you have a temperature greater then 101  - Excessive bleeding that does not stop after holding pressure over the area  - Excessive redness, swelling or bruising, and/ or green or yellow, smelly discharge from incision  - Uncontrolled vomiting   - Loose control of your bladder or bowel function  - Need a pain medication refill   - Need to change your follow up appointment     Information obtained by :  I understand that if any problems occur once I am at home I am to contact my physician. I understand and acknowledge receipt of the instructions indicated above. Physician's or R.N.'s Signature                                                                  Date/Time                                                                                                                                              Patient or Representative Signature                                                          Date/Time

## 2021-06-21 NOTE — PROGRESS NOTES
CM consult received to assist with discharge planning. Patient is s/p R hip pinning secondary to hip fracture following fall. CM in to see patient at bedside who is alert and laying in bed. Demographics, PCP and insurance reviewed. Therapy recommendations for [facility] PT/OT reviewed and patient is agreeable. Patient is agreeable to [facility] who can see patient as early as tomorrow. Information provided, liaison notified and order and corresponding information faxed to [facility]. Patient is also agreeable to rolling walker recommendation. This was provided to patient and signed consent and acknowledgement sent to [facility]  Patient's brother will transport her home. Care Management Interventions  PCP Verified by CM: Yes Arthur Barrera MD)  Mode of Transport at Discharge: Other (see comment) (Brother)  Transition of Care Consult (CM Consult): [address]: No  Discharge Durable Medical Equipment: Yes Arthur Barrera MD)  Physical Therapy Consult: Yes  Occupational Therapy Consult: Yes  Speech Therapy Consult: No  Current Support Network: Own Home, Family Lives Nearby  Confirm Follow Up Transport: Family  The Plan for Transition of Care is Related to the Following Treatment Goals : Patient will participate in [facility] therapies in order to return to baseline independence in ADLs.    The Patient and/or Patient Representative was Provided with a Choice of Provider and Agrees with the Discharge Plan?: Yes  Freedom of Choice List was Provided with Basic Dialogue that Supports the Patient's Individualized Plan of Care/Goals, Treatment Preferences and Shares the Quality Data Associated with the Providers?: Yes  Discharge Location  Discharge Placement: Home with home health

## 2021-06-21 NOTE — PROGRESS NOTES
Hospitalist Discharge Summary     Admit Date:  2021  9:52 AM   DC note date: 2021  Name:  Dominique Castro   Age:  72 y.o.  :  1950   MRN:  [id number]   PCP:  Caroline Wayne MD  Treatment Team: Attending Provider: Suhail Nix MD; Consulting Provider: Jessica Suh MD; Utilization Review: Slade Perkins RN; Charge Nurse: Keesha Maldonado; Physical Therapy Assistant: Dominga Read PTA; Occupational Therapy Assistant: Trish Lorenzo; Occupational Therapy Assistant: SUZY Uribe; Care Manager: Heinz Oppenheim, RN    Problem List for this Hospitalization:  Hospital Problems as of 2021 Date Reviewed: 3/9/2020        Codes Class Noted - Resolved POA    * (Principal) Fracture of femoral neck, right, closed ([facility] ICD-10-CM: S72.001A  ICD-9-CM: 820.8  2021 - Present         Essential hypertension ICD-10-CM: I10  ICD-9-CM: 401.9  2021 - Present Unknown        Osteoarthritis ICD-10-CM: M19.90  ICD-9-CM: 715.90  3/18/2020 - Present Yes        Status post total right knee replacement ICD-10-CM: Z78.564  ICD-9-CM: V43.65  3/18/2020 - Present Yes            Admission HPI from 2021:   Dominique Castro is a 72 y.o. female with hx of HTN, OA, s/p R TKA presented to ER with right hip pain and difficulty in walking following a fall last night. Pt reported being in her usual health until last night. She missed a step while climbing down the stairs, lost her balance and landed on her right side. Pt experienced sudden onset of right hip pain and was unable to get up. Pt denied hitting her head or LOC. She currently reports 7/10 intensity pain in right hip, and stated that morphine didn't help her pain much. She denies nausea/vomiting, fever, chills, lightheadedness/dizziness, urinary symptoms. Blood work was unremarkable.  \"    Hospital Course:  Ever Washington a 72 y.o. female with hx of HTN, OA, s/p R TKA presented to ER with right hip pain and difficulty in walking following a mechanical fall, admitted for closed right femoral subcapital neck fracture. Ortho consulted and patient is status post repair (Hip Percutaneous Pinning Cannulated Screws Right) on 6/19. PT/OT recommend home health physical therapy. Case management consulted. During the hospital stay blood pressure was suboptimally controlled, pain could be a contributing factor. Lisinopril increased to 20 mg daily. Patient to follow-up with PCP for further management of hypertension. All other chronic comorbidities stable and we continued essential home meds. Patient is stable to discharge home with home health physical therapy today. Patient to follow-up with Ortho in 2 weeks. Disposition: Home Health Care Svc  Activity: Activity as tolerated  Diet: ADULT DIET Regular  Code Status: Full Code    Follow Up Orders: Follow-up Appointments   Procedures    FOLLOW UP VISIT Appointment in: Two Weeks Staple removal in office. Call  [phone] for appointment     Staple removal in office. Call  [phone] for appointment     Standing Status:   Standing     Number of Occurrences:   1     Order Specific Question:   Appointment in     Answer: Two Weeks       Follow-up Information     Follow up With Specialties Details Why Contact Info    Prince Mccord MD Internal Medicine On 6/28/2021 10:15 [address] [facility]  [address] [place] [address]      Cody Corbin MD Orthopedic Surgery On 7/8/2021  9:45 for staple removal [facility]  [address] [place] [address]  [phone]      [address] [place] [address] from [facility] will contact you to set up home health evaluations. [phone]          Discharge meds at bottom of this note. Plan was discussed with patient. All questions answered. Patient was stable at time of discharge. Given instructions to call a physician or return if any concerns.   Discharge summary and encounter summary was sent to PCP electronically via \"Comm Mgt\" link in [facility], if possible. Diagnostic Imaging/Tests:   XR CHEST SNGL V    Result Date: 6/19/2021  CHEST X-RAY, one view. HISTORY:  Femur fracture. TECHNIQUE:  AP supine view COMPARISON: None. FINDINGS: Lungs: are clear. Costophrenic angles: are sharp. Heart size: is normal. Pulmonary vasculature: is unremarkable. Aorta: Unremarkable. Included portion of the upper abdomen: is unremarkable. Bones: No gross bony lesions. Other: None. Negative for acute change RIGHT FEMUR 2 view(s). HISTORY: Right knee pain following fall TECHNIQUE: AP and lateral views. COMPARISON: None. FINDINGS / IMPRESSION: Femoral shaft is intact. Status post knee arthroplasty. Femoral neck not well evaluated due to obesity and projection. Dedicated hip films recommended. XR HIP RT W OR WO PELV 2-3 VWS    Result Date: 6/19/2021  PELVIS AND RIGHT HIP, 3 views. HISTORY: Chyrl Rend onto right hip on the stairs yesterday. TECHNIQUE: AP view of the pelvis and coned down AP and frogleg lateral of the hip. FINDINGS: -Pelvic bony ring: Appears intact. -SI joints: Appear symmetric. -Pubic rami: Appear intact. -Lower lumbar spine: Mild DJD -Femoral head: There are osteophytes -Joint space: Symmetrically with osteophytes. -Femoral neck and proximal femoral shaft:  Impacted subcapital femoral neck fracture. Impacted subcapital right femoral neck fracture. Bilateral hip osteoarthritis and lower lumbar degenerative change. XR HIP RT DURING OR PROC    Result Date: 6/19/2021  RIGHT HIP, 4 views. HISTORY: Femoral neck fracture TECHNIQUE: AP view of the pelvis and coned down AP and frogleg lateral of the hip. FINDINGS: Initial 2 intraoperative images demonstrate the femoral neck fracture. Subsequent 2 images demonstrate 3 screws transfixing the femoral neck fracture. ORIF right femoral neck fracture. XR FEMUR RT 2 VS    Result Date: 6/19/2021  CHEST X-RAY, one view. HISTORY:  Femur fracture.  TECHNIQUE:  AP supine view COMPARISON: None. FINDINGS: Lungs: are clear. Costophrenic angles: are sharp. Heart size: is normal. Pulmonary vasculature: is unremarkable. Aorta: Unremarkable. Included portion of the upper abdomen: is unremarkable. Bones: No gross bony lesions. Other: None. Negative for acute change RIGHT FEMUR 2 view(s). HISTORY: Right knee pain following fall TECHNIQUE: AP and lateral views. COMPARISON: None. FINDINGS / IMPRESSION: Femoral shaft is intact. Status post knee arthroplasty. Femoral neck not well evaluated due to obesity and projection. Dedicated hip films recommended. NC XR TECHNOLOGIST SERVICE    Result Date: 6/19/2021  EXAM:  NC XR TECHNOLOGIST SERVICE INDICATION:   Right hip pain COMPARISON: 6/19/2021. FINDINGS: An AP view of the pelvis demonstrates no fracture, lytic or blastic lesion or other abnormality. Patient is status post pinning of the right hip with 3 cannulated screws. Soft tissue air as expected. Ulus Reusing Unremarkable pinning of the right hip with 3 cannulated screws. Echocardiogram results:  No results found for this visit on 06/19/21.     Procedures done this admission:  Procedure(s):  HIP PERCUTANEOUS PINNING CANNULATED SCREWS RIGHT    All Micro Results     None          SARS-CoV-2 Lab Results  \"Novel Coronavirus\" Test: No results found for: COV2NT   \"Emergent Disease\" Test: No results found for: EDPR  \"SARS-COV-2\" Test: No results found for: XGCOVT  Rapid Test: No results found for: COVR         Labs: Results:       BMP, Mg, Phos Recent Labs     06/21/21  0342 06/20/21 0452 06/19/21  1057    142 141   K 4.0 3.8 3.8   * 112* 111*   CO2 26 25 25   AGAP 5* 5* 5*   BUN 11 9 16   CREA 0.41* 0.40* 0.58*   CA 8.7 8.3 9.0   * 92 103*   MG 1.9 1.7*  --       CBC Recent Labs     06/21/21  0342 06/20/21  0452 06/19/21  1057   WBC 8.7 10.1 10.0   RBC 4.41 4.47 4.82   HGB 13.1 13.3 14.3   HCT 40.0 40.8 44.1    179 192   GRANS  --   --  68   LYMPH  --   --  21 EOS  --   --  1   MONOS  --   --  10   BASOS  --   --  1   IG  --   --  0   ANEU  --   --  6.8   ABL  --   --  2.1   HEATHER  --   --  0.1   ABM  --   --  1.0   ABB  --   --  0.1   AIG  --   --  0.0      LFT Recent Labs     06/21/21  0342 06/20/21  0452 06/19/21  1057   ALT 22 21 28    120 133   TP 6.6 6.5 7.2   ALB 3.1* 3.1* 3.7   GLOB 3.5 3.4 3.5   AGRAT 0.9* 0.9* 1.1*      Cardiac Testing No results found for: BNPP, BNP, CPK, RCK1, RCK2, RCK3, RCK4, CKMB, CKNDX, CKND1, TROPT, TROIQ   Coagulation Tests Lab Results   Component Value Date/Time    Prothrombin time 12.8 06/19/2021 10:57 AM    Prothrombin time 12.6 11/11/2019 08:55 AM    INR 0.9 06/19/2021 10:57 AM    INR 0.9 11/11/2019 08:55 AM    aPTT 27.9 11/11/2019 08:55 AM      A1c Lab Results   Component Value Date/Time    Hemoglobin A1c 5.8 11/11/2019 08:55 AM      Lipid Panel No results found for: CHOL, CHOLPOCT, CHOLX, CHLST, CHOLV, 709839, HDL, HDLP, LDL, LDLC, DLDLP, 470837, VLDLC, VLDL, TGLX, TRIGL, TRIGP, TGLPOCT, CHHD, CHHDX   Thyroid Panel No results found for: TSH, T4, FT4, TT3, T3U, TSHEXT, TSHEXT     Most Recent UA No results found for: COLOR, APPRN, REFSG, DERRICK, PROTU, GLUCU, KETU, BILU, BLDU, UROU, JESIKA, LEUKU, WBCU, RBCU, UEPI, BACTU, CASTS, UCRY, MUCUS, UCOM     Allergies   Allergen Reactions    Codeine Nausea and Vomiting     Increased heart rate     There is no immunization history for the selected administration types on file for this patient.     All Labs from Last 24 Hrs:  Recent Results (from the past 24 hour(s))   METABOLIC PANEL, COMPREHENSIVE    Collection Time: 06/21/21  3:42 AM   Result Value Ref Range    Sodium 139 136 - 145 mmol/L    Potassium 4.0 3.5 - 5.1 mmol/L    Chloride 108 (H) 98 - 107 mmol/L    CO2 26 21 - 32 mmol/L    Anion gap 5 (L) 7 - 16 mmol/L    Glucose 107 (H) 65 - 100 mg/dL    BUN 11 8 - 23 MG/DL    Creatinine 0.41 (L) 0.6 - 1.0 MG/DL    GFR est AA >60 >60 ml/min/1.73m2    GFR est non-AA >60 >60 ml/min/1.73m2 Calcium 8.7 8.3 - 10.4 MG/DL    Bilirubin, total 0.6 0.2 - 1.1 MG/DL    ALT (SGPT) 22 12 - 65 U/L    AST (SGOT) 16 15 - 37 U/L    Alk. phosphatase 110 50 - 136 U/L    Protein, total 6.6 6.3 - 8.2 g/dL    Albumin 3.1 (L) 3.2 - 4.6 g/dL    Globulin 3.5 2.3 - 3.5 g/dL    A-G Ratio 0.9 (L) 1.2 - 3.5     MAGNESIUM    Collection Time: 06/21/21  3:42 AM   Result Value Ref Range    Magnesium 1.9 1.8 - 2.4 mg/dL   CBC W/O DIFF    Collection Time: 06/21/21  3:42 AM   Result Value Ref Range    WBC 8.7 4.3 - 11.1 K/uL    RBC 4.41 4.05 - 5.2 M/uL    HGB 13.1 11.7 - 15.4 g/dL    HCT 40.0 35.8 - 46.3 %    MCV 90.7 79.6 - 97.8 FL    MCH 29.7 26.1 - 32.9 PG    MCHC 32.8 31.4 - 35.0 g/dL    RDW 13.6 11.9 - 14.6 %    PLATELET 431 789 - 196 K/uL    MPV 11.3 9.4 - 12.3 FL    ABSOLUTE NRBC 0.00 0.0 - 0.2 K/uL       Discharge Exam:  Patient Vitals for the past 24 hrs:   Temp Pulse Resp BP SpO2   06/21/21 0727 98.2 °F (36.8 °C) 92 18 (!) 162/80 94 %   06/21/21 0527  91  (!) 138/92    06/21/21 0435 97.9 °F (36.6 °C) 84 18 (!) 173/109 92 %   06/20/21 2333 98.2 °F (36.8 °C) 89 18 138/77 94 %   06/20/21 2020 98.4 °F (36.9 °C) 97 18 (!) 142/89 92 %   06/20/21 1654 98.5 °F (36.9 °C) 94 19 (!) 160/82 91 %   06/20/21 1248 98.9 °F (37.2 °C) 83 18 (!) 152/81 95 %     Oxygen Therapy  O2 Sat (%): 94 % (06/21/21 0727)  Pulse via Oximetry: 80 beats per minute (06/19/21 1723)  O2 Device: Nasal cannula (06/19/21 1708)  O2 Flow Rate (L/min): 2 l/min (06/19/21 1708)    Estimated body mass index is 28.89 kg/m² as calculated from the following:    Height as of this encounter: 5' 6\" (1.676 m). Weight as of this encounter: 81.2 kg (179 lb). Intake/Output Summary (Last 24 hours) at 6/21/2021 1145  Last data filed at 6/21/2021 5567  Gross per 24 hour   Intake 300 ml   Output    Net 300 ml       *Note that automatically entered I/Os may not be accurate; dependent on patient compliance with collection and accurate  by assistants.     General: Well nourished. Alert. Eyes:   Normal sclerae. Extraocular movements intact. ENT:  Normocephalic, atraumatic. Moist mucous membranes  CV:   Regular rate and rhythm. No murmur, rub, or gallop. Lungs:  Clear to auscultation bilaterally. No wheezing, rhonchi, or rales. Abdomen: Soft, nontender, nondistended. Extremities: Warm and dry. No cyanosis or edema. Status post right hip repair  Neurologic: CN II-XII grossly intact. No gross focal deficits   Skin:     No rashes or jaundice. Psych:  Normal mood and affect.     Current Med List in Hospital:   Current Facility-Administered Medications   Medication Dose Route Frequency    lisinopriL (PRINIVIL, ZESTRIL) tablet 20 mg  20 mg Oral QHS    buPROPion SR (WELLBUTRIN SR) tablet 150 mg  150 mg Oral BID    acetaminophen (TYLENOL) tablet 650 mg  650 mg Oral Q6H PRN    polyethylene glycol (MIRALAX) packet 17 g  17 g Oral DAILY PRN    ondansetron (ZOFRAN ODT) tablet 4 mg  4 mg Oral Q8H PRN    Or    ondansetron (ZOFRAN) injection 4 mg  4 mg IntraVENous Q6H PRN    potassium chloride 10 mEq in 100 ml IVPB  10 mEq IntraVENous PRN    magnesium sulfate 2 g/50 ml IVPB (premix or compounded)  2 g IntraVENous PRN    famotidine (PEPCID) tablet 20 mg  20 mg Oral BID    calcium carbonate (OS-ABHI) tablet 500 mg [elemental]  500 mg Oral TID WITH MEALS    cyclobenzaprine (FLEXERIL) tablet 5 mg  5 mg Oral TID    hydrALAZINE (APRESOLINE) 20 mg/mL injection 10 mg  10 mg IntraVENous Q6H PRN    butalbital-acetaminophen-caffeine (FIORICET, ESGIC) -40 mg per tablet 1 Tablet  1 Tablet Oral Q6H PRN    alcohol 62% (NOZIN) nasal  1 Ampule  1 Ampule Topical Q12H    sodium chloride (NS) flush 5-40 mL  5-40 mL IntraVENous Q8H    sodium chloride (NS) flush 5-40 mL  5-40 mL IntraVENous PRN    acetaminophen (TYLENOL) tablet 1,000 mg  1,000 mg Oral Q6H    naloxone (NARCAN) injection 0.2-0.4 mg  0.2-0.4 mg IntraVENous Q10MIN PRN    diphenhydrAMINE (BENADRYL) capsule 25 mg  25 mg Oral Q4H PRN    senna-docusate (PERICOLACE) 8.6-50 mg per tablet 2 Tablet  2 Tablet Oral DAILY    oxyCODONE IR (ROXICODONE) tablet 5 mg  5 mg Oral Q3H PRN    HYDROmorphone (DILAUDID) injection 1 mg  1 mg IntraVENous Q3H PRN    aspirin delayed-release tablet 325 mg  325 mg Oral DAILY       Discharge Info:   Current Discharge Medication List      START taking these medications    Details   aspirin delayed-release 325 mg tablet Take 1 Tablet by mouth daily for 30 days. Qty: 30 Tablet, Refills: 0  Start date: 6/22/2021, End date: 7/22/2021      calcium carbonate (OS-ABHI) 500 mg calcium (1,250 mg) tablet Take 1 Tablet by mouth two (2) times a day for 30 days. Qty: 60 Tablet, Refills: 0  Start date: 6/21/2021, End date: 7/21/2021      cyclobenzaprine (FLEXERIL) 10 mg tablet Take 0.5 Tablets by mouth two (2) times a day for 3 days. Qty: 3 Tablet, Refills: 0  Start date: 6/21/2021, End date: 6/24/2021      famotidine (PEPCID) 20 mg tablet Take 1 Tablet by mouth two (2) times a day for 30 days. Qty: 60 Tablet, Refills: 0  Start date: 6/21/2021, End date: 7/21/2021      senna-docusate (PERICOLACE) 8.6-50 mg per tablet Take 2 Tablets by mouth daily for 5 days. Qty: 10 Tablet, Refills: 0  Start date: 6/22/2021, End date: 6/27/2021         CONTINUE these medications which have CHANGED    Details   lisinopriL (PRINIVIL, ZESTRIL) 10 mg tablet Take 2 Tablets by mouth daily for 30 days. Qty: 60 Tablet, Refills: 0  Start date: 6/21/2021, End date: 7/21/2021      HYDROmorphone (DILAUDID) 2 mg tablet Take 1 Tablet by mouth every six (6) hours as needed for Pain for up to 3 days. Max Daily Amount: 8 mg. Qty: 12 Tablet, Refills: 0  Start date: 6/21/2021, End date: 6/24/2021    Associated Diagnoses: Closed fracture of neck of right femur, initial encounter ([facility]         CONTINUE these medications which have NOT CHANGED    Details   polyethylene glycol (MIRALAX) 17 gram packet Take 17 g by mouth daily.  mix with 6 oz of fluids      turmeric 400 mg cap Take 1 Tab by mouth daily. hyoscyamine SL (LEVSIN/SL) 0.125 mg SL tablet 1 Tab by SubLINGual route every four (4) hours as needed for Cramping (bladder spasms). Qty: 30 Tab, Refills: 1      buPROPion XL (WELLBUTRIN XL) 150 mg tablet Take 150 mg by mouth two (2) times a day. meclizine (ANTIVERT) 25 mg tablet Take 25 mg by mouth daily as needed for Dizziness. cholecalciferol, vitamin D3, (VITAMIN D3 PO) Take 1 Tab by mouth two (2) times a day. ginkgo biloba (GINKOBA PO) Take 120 mg by mouth two (2) times a day. folic acid/multivit-min/lutein (CENTRUM SILVER PO) Take 1 Tab by mouth daily. ascorbic acid, vitamin C, (VITAMIN C) 250 mg tablet Take 750 mg by mouth daily. STOP taking these medications       ibuprofen (AdviL) 200 mg tablet Comments:   Reason for Stopping:         diphenhydrAMINE-acetaminophen (Tylenol PM Extra Strength)  mg tab Comments:   Reason for Stopping:         carisoprodol (SOMA) 350 mg tablet Comments:   Reason for Stopping:                 Time spent in patient discharge planning and coordination 36 minutes.     Signed:  Yesy Yepez MD

## 2021-06-21 NOTE — ADDENDUM NOTE
Addendum  created 06/21/21 0800 by Yanet Rosas CRNA    Flowsheet accepted, Intraprocedure Flowsheets edited

## 2021-06-21 NOTE — PROGRESS NOTES
2021         Post Op day: 2 Days Post-Op Procedure(s) (LRB):  HIP PERCUTANEOUS PINNING CANNULATED SCREWS RIGHT (Right)      Admit Date: 2021  Admit Diagnosis: Closed right hip fracture, [facility] [S72.001A]       Principle Problem: Fracture of femoral neck, right, closed ([facility] Subjective: Doing well, No complaints, No SOB, No Chest Pain, No Nausea or Vomiting     Objective:   Vital Signs are Stable, No Acute Distress, Alert,  Dressing is Dry,  Neurovascular exam is normal.     Assessment / Plan :  Patient Active Problem List   Diagnosis Code    Osteoarthritis M19.90    Status post total right knee replacement Z96.651    Fracture of femoral neck, right, closed ([facility] S72.001A    Essential hypertension I10      Patient Vitals for the past 8 hrs:   BP Temp Pulse Resp SpO2   21 0727 (!) 162/80 98.2 °F (36.8 °C) 92 18 94 %   21 0527 (!) 138/92  91     21 0435 (!) 173/109 97.9 °F (36.6 °C) 84 18 92 %    Temp (24hrs), Av.4 °F (36.9 °C), Min:97.9 °F (36.6 °C), Max:98.9 °F (37.2 °C)    Body mass index is 28.89 kg/m².     Lab Results   Component Value Date/Time    HGB 13.1 2021 03:42 AM          S/P Procedure(s) (LRB):  HIP PERCUTANEOUS PINNING CANNULATED SCREWS RIGHT (Right)       Medical Mgmt per hospitalist  Anticoagulation plan: ASA 325mg daily  Continue PT: Toe down weight bearing  Fall Precautions  DC disp: home with [facility] when cleared medically   F/U: 2 weeks postop for wound check and staple removal        Signed By: KIP Dempsey  2021,  9:20 AM

## 2021-06-21 NOTE — PROGRESS NOTES
Problem: Pressure Injury - Risk of  Goal: *Prevention of pressure injury  Description: Document Bobby Scale and appropriate interventions in the flowsheet. Outcome: Resolved/Met     Problem: Patient Education: Go to Patient Education Activity  Goal: Patient/Family Education  Outcome: Resolved/Met     Problem: Falls - Risk of  Goal: *Absence of Falls  Description: Document Jm Fall Risk and appropriate interventions in the flowsheet.   Outcome: Resolved/Met     Problem: Patient Education: Go to Patient Education Activity  Goal: Patient/Family Education  Outcome: Resolved/Met     Problem: Patient Education: Go to Patient Education Activity  Goal: Patient/Family Education  Outcome: Resolved/Met     Problem: Hip Fracture:Day of Admission Pre-op  Goal: Off Pathway (Use only if patient is Off Pathway)  Outcome: Resolved/Met  Goal: Activity/Safety  Outcome: Resolved/Met  Goal: Consults, if ordered  Outcome: Resolved/Met  Goal: Diagnostic Test/Procedures  Outcome: Resolved/Met  Goal: Nutrition/Diet  Outcome: Resolved/Met  Goal: Medications  Outcome: Resolved/Met  Goal: Respiratory  Outcome: Resolved/Met  Goal: Treatments/Interventions/Procedures  Outcome: Resolved/Met  Goal: Psychosocial  Outcome: Resolved/Met  Goal: *Labs/Tests Within Defined Limits in Preparation for Surgery  Outcome: Resolved/Met  Goal: *Optimal pain control at patient's stated goal  Outcome: Resolved/Met  Goal: *Hemodynamically stable  Outcome: Resolved/Met     Problem: Hip Fracture: Day of Surgery Post-op Care  Goal: Off Pathway (Use only if patient is Off Pathway)  Outcome: Resolved/Met  Goal: Activity/Safety  Outcome: Resolved/Met  Goal: Consults, if ordered  Outcome: Resolved/Met  Goal: Diagnostic Test/Procedures  Outcome: Resolved/Met  Goal: Nutrition/Diet  Outcome: Resolved/Met  Goal: Medications  Outcome: Resolved/Met  Goal: Respiratory  Outcome: Resolved/Met  Goal: Treatments/Interventions/Procedures  Outcome: Resolved/Met  Goal: Psychosocial  Outcome: Resolved/Met  Goal: *Absence of skin breakdown  Outcome: Resolved/Met  Goal: *Optimal pain control at patient's stated goal  Outcome: Resolved/Met  Goal: *Hemodynamically stable  Outcome: Resolved/Met     Problem: Hip Fracture: Post-Op Day 1  Goal: Off Pathway (Use only if patient is Off Pathway)  Outcome: Resolved/Met  Goal: Activity/Safety  Outcome: Resolved/Met  Goal: Diagnostic Test/Procedures  Outcome: Resolved/Met  Goal: Nutrition/Diet  Outcome: Resolved/Met  Goal: Medications  Outcome: Resolved/Met  Goal: Respiratory  Outcome: Resolved/Met  Goal: Treatments/Interventions/Procedures  Outcome: Resolved/Met  Goal: Psychosocial  Outcome: Resolved/Met  Goal: Discharge Planning  Outcome: Resolved/Met  Goal: *Demonstrates progressive activity  Outcome: Resolved/Met  Goal: *Optimal pain control at patient's stated goal  Outcome: Resolved/Met  Goal: *Hemodynamically stable  Outcome: Resolved/Met  Goal: *Discharge plan identified  Outcome: Resolved/Met  Goal: *Absence of skin breakdown  Outcome: Resolved/Met     Problem: Hip Fracture: Post-Op Day 2  Goal: Off Pathway (Use only if patient is Off Pathway)  Outcome: Resolved/Met  Goal: Activity/Safety  Outcome: Resolved/Met  Goal: Diagnostic Test/Procedures  Outcome: Resolved/Met  Goal: Nutrition/Diet  Outcome: Resolved/Met  Goal: Medications  Outcome: Resolved/Met  Goal: Respiratory  Outcome: Resolved/Met  Goal: Treatments/Interventions/Procedures  Outcome: Resolved/Met  Goal: Psychosocial  Outcome: Resolved/Met  Goal: Discharge Planning  Outcome: Resolved/Met  Goal: *Optimal pain control at patient's stated goal  Outcome: Resolved/Met  Goal: *Hemodynamically stable  Outcome: Resolved/Met  Goal: *Adequate oxygenation  Outcome: Resolved/Met  Goal: *Tolerates increased activity  Outcome: Resolved/Met  Goal: *Tolerates nutrition therapy  Outcome: Resolved/Met  Goal: *Absence of skin breakdown  Outcome: Resolved/Met     Problem: Hip Fracture: Post-Op Day 3  Goal: Off Pathway (Use only if patient is Off Pathway)  Outcome: Resolved/Met  Goal: Activity/Safety  Outcome: Resolved/Met  Goal: Diagnostic Test/Procedures  Outcome: Resolved/Met  Goal: Nutrition/Diet  Outcome: Resolved/Met  Goal: Medications  Outcome: Resolved/Met  Goal: Respiratory  Outcome: Resolved/Met  Goal: Treatments/Interventions/Procedures  Outcome: Resolved/Met  Goal: Psychosocial  Outcome: Resolved/Met  Goal: Discharge Planning  Outcome: Resolved/Met  Goal: *Met physical therapy criteria for discharge to next level of care  Outcome: Resolved/Met  Goal: *Optimal pain control at patient's stated goal  Outcome: Resolved/Met  Goal: *Hemodynamically stable  Outcome: Resolved/Met  Goal: *Tolerating diet  Outcome: Resolved/Met  Goal: *Active bowel function  Outcome: Resolved/Met  Goal: *Adequate urinary output  Outcome: Resolved/Met  Goal: *Absence of skin breakdown  Outcome: Resolved/Met  Goal: *Patient verbalizes understanding of discharge instructions  Outcome: Resolved/Met     Problem: Hip Fracture: Post-Op Day 4  Goal: Off Pathway (Use only if patient is Off Pathway)  Outcome: Resolved/Met  Goal: Activity/Safety  Outcome: Resolved/Met  Goal: Diagnostic Test/Procedures  Outcome: Resolved/Met  Goal: Nutrition/Diet  Outcome: Resolved/Met  Goal: Medications  Outcome: Resolved/Met  Goal: Respiratory  Outcome: Resolved/Met  Goal: Treatments/Interventions/Procedures  Outcome: Resolved/Met  Goal: Psychosocial  Outcome: Resolved/Met  Goal: Discharge Planning  Outcome: Resolved/Met  Goal: *Met physical therapy criteria for discharge to next level of care  Outcome: Resolved/Met  Goal: *Optimal pain control at patient's stated goal  Outcome: Resolved/Met  Goal: *Hemodynamically stable  Outcome: Resolved/Met  Goal: *Tolerating diet  Outcome: Resolved/Met  Goal: *Active bowel function  Outcome: Resolved/Met  Goal: *Adequate urinary output  Outcome: Resolved/Met  Goal: *Absence of skin breakdown  Outcome: Resolved/Met  Goal: *Patient verbalizes understanding of discharge instructions  Outcome: Resolved/Met

## 2021-06-23 NOTE — DISCHARGE SUMMARY
Hospitalist Discharge Summary     Admit Date:  2021  9:52 AM   DC note date: 2021  Name:  Leslee Benavidez   Age:  72 y.o.  :  1950   MRN:  [id number]   PCP:  Bartolome Mckinnon MD  Treatment Team: Consulting Provider: Kenrick Haas MD; Utilization Review: Emily Rodriguez RN; Charge Nurse: Emil Pedraza; Physical Therapy Assistant: Omayra Kirkland PTA; Occupational Therapy Assistant: Isaiah Leger; Occupational Therapy Assistant: SUZY Merrill; Care Manager: Terrance Lyons RN    Problem List for this Hospitalization:  Hospital Problems as of 2021 Date Reviewed: 3/9/2020        Codes Class Noted - Resolved POA    * (Principal) Fracture of femoral neck, right, closed ([facility] [address] ICD-10-CM: S72.001A  ICD-9-CM: 820.8  2021 - Present         Essential hypertension ICD-10-CM: I10  ICD-9-CM: 401.9  2021 - Present Unknown        Osteoarthritis ICD-10-CM: M19.90  ICD-9-CM: 715.90  3/18/2020 - Present Yes        Status post total right knee replacement ICD-10-CM: Z96.651  ICD-9-CM: V43.65  3/18/2020 - Present Yes            Admission HPI from 2021:   Leslee Benavidez is a 72 y.o. female with hx of HTN, OA, s/p R TKA presented to ER with right hip pain and difficulty in walking following a fall last night. Pt reported being in her usual health until last night. She missed a step while climbing down the stairs, lost her balance and landed on her right side. Pt experienced sudden onset of right hip pain and was unable to get up. Pt denied hitting her head or LOC. She currently reports 7/10 intensity pain in right hip, and stated that morphine didn't help her pain much. She denies nausea/vomiting, fever, chills, lightheadedness/dizziness, urinary symptoms. Blood work was unremarkable.  \"    Hospital Course:  Dov Smith a 72 y.o. female with hx of HTN, OA, s/p R TKA presented to ER with right hip pain and difficulty in walking following a mechanical fall, admitted for closed right femoral subcapital neck fracture. Ortho consulted and patient is status post repair (Hip Percutaneous Pinning Cannulated Screws Right) on 6/19. PT/OT recommend home health physical therapy. Case management consulted. During the hospital stay blood pressure was suboptimally controlled, pain could be a contributing factor. Lisinopril increased to 20 mg daily. Patient to follow-up with PCP for further management of hypertension. All other chronic comorbidities stable and we continued essential home meds. Patient is stable to discharge home with home health physical therapy today. Patient to follow-up with Ortho in 2 weeks. Disposition: Home Health Care Svc  Activity: Activity as tolerated  Diet: No diet orders on file  Code Status: Prior    Follow Up Orders: Follow-up Appointments   Procedures    FOLLOW UP VISIT Appointment in: Two Weeks Staple removal in office. Call  [phone] for appointment     Staple removal in office. Call  [phone] for appointment     Standing Status:   Standing     Number of Occurrences:   1     Order Specific Question:   Appointment in     Answer: Two Weeks       Follow-up Information     Follow up With Specialties Details Why Contact Info    Dario Dominguez MD Internal Medicine On 6/28/2021 10:15 [address] [place] [address]  [address] [place] [address]      Dimitry Montemayor MD Orthopedic Surgery On 7/8/2021  9:45 for staple removal [facility]  [address] [place] [address]  [phone]      [address] [place] [address] from [facility] will contact you to set up home health evaluations. [phone]          Discharge meds at bottom of this note. Plan was discussed with patient. All questions answered. Patient was stable at time of discharge. Given instructions to call a physician or return if any concerns.   Discharge summary and encounter summary was sent to PCP electronically via \"Comm Mgt\" link in [facility], if possible. Diagnostic Imaging/Tests:   XR CHEST SNGL V    Result Date: 6/19/2021  CHEST X-RAY, one view. HISTORY:  Femur fracture. TECHNIQUE:  AP supine view COMPARISON: None. FINDINGS: Lungs: are clear. Costophrenic angles: are sharp. Heart size: is normal. Pulmonary vasculature: is unremarkable. Aorta: Unremarkable. Included portion of the upper abdomen: is unremarkable. Bones: No gross bony lesions. Other: None. Negative for acute change RIGHT FEMUR 2 view(s). HISTORY: Right knee pain following fall TECHNIQUE: AP and lateral views. COMPARISON: None. FINDINGS / IMPRESSION: Femoral shaft is intact. Status post knee arthroplasty. Femoral neck not well evaluated due to obesity and projection. Dedicated hip films recommended. XR HIP RT W OR WO PELV 2-3 VWS    Result Date: 6/19/2021  PELVIS AND RIGHT HIP, 3 views. HISTORY: Manish Lint onto right hip on the stairs yesterday. TECHNIQUE: AP view of the pelvis and coned down AP and frogleg lateral of the hip. FINDINGS: -Pelvic bony ring: Appears intact. -SI joints: Appear symmetric. -Pubic rami: Appear intact. -Lower lumbar spine: Mild DJD -Femoral head: There are osteophytes -Joint space: Symmetrically with osteophytes. -Femoral neck and proximal femoral shaft:  Impacted subcapital femoral neck fracture. Impacted subcapital right femoral neck fracture. Bilateral hip osteoarthritis and lower lumbar degenerative change. XR HIP RT DURING OR PROC    Result Date: 6/19/2021  RIGHT HIP, 4 views. HISTORY: Femoral neck fracture TECHNIQUE: AP view of the pelvis and coned down AP and frogleg lateral of the hip. FINDINGS: Initial 2 intraoperative images demonstrate the femoral neck fracture. Subsequent 2 images demonstrate 3 screws transfixing the femoral neck fracture. ORIF right femoral neck fracture. XR FEMUR RT 2 VS    Result Date: 6/19/2021  CHEST X-RAY, one view. HISTORY:  Femur fracture. TECHNIQUE:  AP supine view COMPARISON: None.  FINDINGS: Lungs: are clear. Costophrenic angles: are sharp. Heart size: is normal. Pulmonary vasculature: is unremarkable. Aorta: Unremarkable. Included portion of the upper abdomen: is unremarkable. Bones: No gross bony lesions. Other: None. Negative for acute change RIGHT FEMUR 2 view(s). HISTORY: Right knee pain following fall TECHNIQUE: AP and lateral views. COMPARISON: None. FINDINGS / IMPRESSION: Femoral shaft is intact. Status post knee arthroplasty. Femoral neck not well evaluated due to obesity and projection. Dedicated hip films recommended. NC XR TECHNOLOGIST SERVICE    Result Date: 6/19/2021  EXAM:  NC XR TECHNOLOGIST SERVICE INDICATION:   Right hip pain COMPARISON: 6/19/2021. FINDINGS: An AP view of the pelvis demonstrates no fracture, lytic or blastic lesion or other abnormality. Patient is status post pinning of the right hip with 3 cannulated screws. Soft tissue air as expected. Wava Prim Unremarkable pinning of the right hip with 3 cannulated screws. Echocardiogram results:  No results found for this visit on 06/19/21.     Procedures done this admission:  Procedure(s):  HIP PERCUTANEOUS PINNING CANNULATED SCREWS RIGHT    All Micro Results     None          SARS-CoV-2 Lab Results  \"Novel Coronavirus\" Test: No results found for: COV2NT   \"Emergent Disease\" Test: No results found for: EDPR  \"SARS-COV-2\" Test: No results found for: XGCOVT  Rapid Test: No results found for: COVR         Labs: Results:       BMP, Mg, Phos Recent Labs     06/21/21  0342      K 4.0   *   CO2 26   AGAP 5*   BUN 11   CREA 0.41*   CA 8.7   *   MG 1.9      CBC Recent Labs     06/21/21  0342   WBC 8.7   RBC 4.41   HGB 13.1   HCT 40.0         LFT Recent Labs     06/21/21  0342   ALT 22      TP 6.6   ALB 3.1*   GLOB 3.5   AGRAT 0.9*      Cardiac Testing No results found for: BNPP, BNP, CPK, RCK1, RCK2, RCK3, RCK4, CKMB, CKNDX, CKND1, TROPT, TROIQ   Coagulation Tests Lab Results   Component Value Date/Time Prothrombin time 12.8 06/19/2021 10:57 AM    Prothrombin time 12.6 11/11/2019 08:55 AM    INR 0.9 06/19/2021 10:57 AM    INR 0.9 11/11/2019 08:55 AM    aPTT 27.9 11/11/2019 08:55 AM      A1c Lab Results   Component Value Date/Time    Hemoglobin A1c 5.8 11/11/2019 08:55 AM      Lipid Panel No results found for: CHOL, CHOLPOCT, CHOLX, CHLST, CHOLV, 791932, HDL, HDLP, LDL, LDLC, DLDLP, 542499, VLDLC, VLDL, TGLX, TRIGL, TRIGP, TGLPOCT, CHHD, CHHDX   Thyroid Panel No results found for: TSH, T4, FT4, TT3, T3U, TSHEXT, TSHEXT     Most Recent UA No results found for: COLOR, APPRN, REFSG, DERRICK, PROTU, GLUCU, KETU, BILU, BLDU, UROU, JESIKA, LEUKU, WBCU, RBCU, UEPI, BACTU, CASTS, UCRY, MUCUS, UCOM     Allergies   Allergen Reactions    Codeine Nausea and Vomiting     Increased heart rate     There is no immunization history for the selected administration types on file for this patient. All Labs from Last 24 Hrs:  No results found for this or any previous visit (from the past 24 hour(s)). Discharge Exam:  No data found. Oxygen Therapy  O2 Sat (%): 94 % (06/21/21 0727)  Pulse via Oximetry: 80 beats per minute (06/19/21 1723)  O2 Device: Nasal cannula (06/19/21 1708)  O2 Flow Rate (L/min): 2 l/min (06/19/21 1708)    Estimated body mass index is 28.89 kg/m² as calculated from the following:    Height as of this encounter: 5' 6\" (1.676 m). Weight as of this encounter: 81.2 kg (179 lb). No intake or output data in the 24 hours ending 06/23/21 1420    *Note that automatically entered I/Os may not be accurate; dependent on patient compliance with collection and accurate  by assistants. General:    Well nourished. Alert. Eyes:   Normal sclerae. Extraocular movements intact. ENT:  Normocephalic, atraumatic. Moist mucous membranes  CV:   Regular rate and rhythm. No murmur, rub, or gallop. Lungs:  Clear to auscultation bilaterally. No wheezing, rhonchi, or rales. Abdomen: Soft, nontender, nondistended. Extremities: Warm and dry. No cyanosis or edema. Status post right hip repair  Neurologic: CN II-XII grossly intact. No gross focal deficits   Skin:     No rashes or jaundice. Psych:  Normal mood and affect. Current Med List in Hospital:   No current facility-administered medications for this encounter. Current Outpatient Medications   Medication Sig    aspirin delayed-release 325 mg tablet Take 1 Tablet by mouth daily for 30 days.  calcium carbonate (OS-ABHI) 500 mg calcium (1,250 mg) tablet Take 1 Tablet by mouth two (2) times a day for 30 days.  cyclobenzaprine (FLEXERIL) 10 mg tablet Take 0.5 Tablets by mouth two (2) times a day for 3 days.  famotidine (PEPCID) 20 mg tablet Take 1 Tablet by mouth two (2) times a day for 30 days.  senna-docusate (PERICOLACE) 8.6-50 mg per tablet Take 2 Tablets by mouth daily for 5 days.  lisinopriL (PRINIVIL, ZESTRIL) 10 mg tablet Take 2 Tablets by mouth daily for 30 days.  HYDROmorphone (DILAUDID) 2 mg tablet Take 1 Tablet by mouth every six (6) hours as needed for Pain for up to 3 days. Max Daily Amount: 8 mg.  polyethylene glycol (MIRALAX) 17 gram packet Take 17 g by mouth daily. mix with 6 oz of fluids    turmeric 400 mg cap Take 1 Tab by mouth daily.  hyoscyamine SL (LEVSIN/SL) 0.125 mg SL tablet 1 Tab by SubLINGual route every four (4) hours as needed for Cramping (bladder spasms).  buPROPion XL (WELLBUTRIN XL) 150 mg tablet Take 150 mg by mouth two (2) times a day.  meclizine (ANTIVERT) 25 mg tablet Take 25 mg by mouth daily as needed for Dizziness.  cholecalciferol, vitamin D3, (VITAMIN D3 PO) Take 1 Tab by mouth two (2) times a day.  ginkgo biloba (GINKOBA PO) Take 120 mg by mouth two (2) times a day.  folic acid/multivit-min/lutein (CENTRUM SILVER PO) Take 1 Tab by mouth daily.  ascorbic acid, vitamin C, (VITAMIN C) 250 mg tablet Take 750 mg by mouth daily.        Discharge Info:   Discharge Medication List as of 6/21/2021 11:15 AM      START taking these medications    Details   aspirin delayed-release 325 mg tablet Take 1 Tablet by mouth daily for 30 days. , Normal, Disp-30 Tablet, R-0      calcium carbonate (OS-ABHI) 500 mg calcium (1,250 mg) tablet Take 1 Tablet by mouth two (2) times a day for 30 days. , Normal, Disp-60 Tablet, R-0      cyclobenzaprine (FLEXERIL) 10 mg tablet Take 0.5 Tablets by mouth two (2) times a day for 3 days. , Normal, Disp-3 Tablet, R-0      famotidine (PEPCID) 20 mg tablet Take 1 Tablet by mouth two (2) times a day for 30 days. , Normal, Disp-60 Tablet, R-0      senna-docusate (PERICOLACE) 8.6-50 mg per tablet Take 2 Tablets by mouth daily for 5 days. , Normal, Disp-10 Tablet, R-0      oxyCODONE IR (ROXICODONE) 5 mg immediate release tablet Take 1 Tablet by mouth every six (6) hours as needed for Pain for up to 3 days. Max Daily Amount: 20 mg., Print, Disp-12 Tablet, R-0         CONTINUE these medications which have CHANGED    Details   lisinopriL (PRINIVIL, ZESTRIL) 10 mg tablet Take 2 Tablets by mouth daily for 30 days. , Normal, Disp-60 Tablet, R-0         CONTINUE these medications which have NOT CHANGED    Details   polyethylene glycol (MIRALAX) 17 gram packet Take 17 g by mouth daily. mix with 6 oz of fluids, Historical Med      turmeric 400 mg cap Take 1 Tab by mouth daily. , Historical Med      hyoscyamine SL (LEVSIN/SL) 0.125 mg SL tablet 1 Tab by SubLINGual route every four (4) hours as needed for Cramping (bladder spasms). , Normal, Disp-30 Tab, R-1      buPROPion XL (WELLBUTRIN XL) 150 mg tablet Take 150 mg by mouth two (2) times a day., Historical Med      meclizine (ANTIVERT) 25 mg tablet Take 25 mg by mouth daily as needed for Dizziness. , Historical Med      cholecalciferol, vitamin D3, (VITAMIN D3 PO) Take 1 Tab by mouth two (2) times a day., Historical Med      ginkgo biloba (GINKOBA PO) Take 120 mg by mouth two (2) times a day., Historical Med      folic acid/multivit-min/lutein (CENTRUM SILVER PO) Take 1 Tab by mouth daily. , Historical Med      ascorbic acid, vitamin C, (VITAMIN C) 250 mg tablet Take 750 mg by mouth daily. , Historical Med         STOP taking these medications       HYDROmorphone (DILAUDID) 2 mg tablet Comments:   Reason for Stopping:         ibuprofen (AdviL) 200 mg tablet Comments:   Reason for Stopping:         diphenhydrAMINE-acetaminophen (Tylenol PM Extra Strength)  mg tab Comments:   Reason for Stopping:         carisoprodol (SOMA) 350 mg tablet Comments:   Reason for Stopping:                 Time spent in patient discharge planning and coordination 36 minutes.     Signed:  Ronaldo Guerrero MD

## 2021-09-20 ENCOUNTER — HOSPITAL ENCOUNTER (OUTPATIENT)
Dept: LAB | Age: 71
Discharge: HOME | End: 2021-09-20

## 2021-09-20 PROCEDURE — 88305 TISSUE EXAM BY PATHOLOGIST: CPT

## 2022-11-21 NOTE — ANESTHESIA PROCEDURE NOTES
Peripheral Block    Start time: 3/18/2020 7:03 AM  End time: 3/18/2020 7:04 AM  Performed by: Sahra Argueta MD  Authorized by: Sahra Argueta MD       Pre-procedure: Indications: at surgeon's request and post-op pain management    Preanesthetic Checklist: patient identified, risks and benefits discussed, site marked, timeout performed, anesthesia consent given and patient being monitored    Timeout Time: 07:03          Block Type:   Block Type:   Adductor canal  Laterality:  Right  Monitoring:  Standard ASA monitoring, continuous pulse ox, frequent vital sign checks, heart rate, oxygen and responsive to questions  Injection Technique:  Single shot  Procedures: ultrasound guided and nerve stimulator    Patient Position: supine  Prep: chlorhexidine    Location:  Mid thigh  Needle Type:  Stimuplex  Needle Gauge:  22 G  Needle Localization:  Nerve stimulator and ultrasound guidance  Motor Response: minimal motor response >0.4 mA      Assessment:  Number of attempts:  1  Injection Assessment:  Local visualized surrounding nerve on ultrasound, negative aspiration for blood, no paresthesia, no intravascular symptoms, ultrasound image on chart and incremental injection every 5 mL  Patient tolerance:  Patient tolerated the procedure well with no immediate complications  Diluted with 10 ml of PF NS Pt aware of risks of zofran, will try to avoid until at least 7 weeks per her own research.     Is able to keep food/water down but diet is limited and lifestyle is restricted d/t severe nausea.

## 2023-11-16 NOTE — PROGRESS NOTES
Care Management Interventions  PCP Verified by CM: Yes  Mode of Transport at Discharge: Self  Transition of Care Consult (CM Consult): [address]: Yes  Discharge Durable Medical Equipment: No  Physical Therapy Consult: Yes  Occupational Therapy Consult: Yes  Current Support Network: Own Home  Confirm Follow Up Transport: Family  The Plan for Transition of Care is Related to the Following Treatment Goals : return to independent function  The Patient and/or Patient Representative was Provided with a Choice of Provider and Agrees with the Discharge Plan?: Yes  Freedom of Choice List was Provided with Basic Dialogue that Supports the Patient's Individualized Plan of Care/Goals, Treatment Preferences and Shares the Quality Data Associated with the Providers?: Yes  Discharge Location  Discharge Placement: Home with home health    Patient is a 77y.o. year old female admitted for Right TKA . Patient plans to return home on discharge. Order received to arrange home health. Patient without preference towards agency. [facility] Patient denies any equipment needs as patient has a walker and raised toilet seat. . Equipment delivered to the hospital room prior to discharge. Will follow until discharge. Home Care Instructions for Colonoscopy with Sedation    Diet:  - Resume your regular diet as tolerated unless otherwise instructed. - Start with light meals to minimize bloating.  - Do not drink alcohol today. Medication:  - If you have questions about resuming your normal medications, please contact your Primary Care Physician. Activities:  - Take it easy today. Do not return to work today. - Do not drive today. - Do not operate any machinery today (including kitchen equipment). Colonoscopy:  - You may notice some rectal \"spotting\" (a little blood on the toilet tissue) for a day or two after the exam. This is normal.  - If you experience any rectal bleeding (not spotting), persistent tenderness or sharp severe abdominal pains, oral temperature over 100 degrees Fahrenheit, light-headedness or dizziness, or any other problems, contact your doctor. **If unable to reach your doctor, please go to the [facility] Emergency Room**    - Your referring physician will receive a full report of your examination.  - If you do not hear from your doctor's office within two weeks of your biopsy, please call them for your results. You may be able to see your laboratory results in [facility] between 4 and 7 business days. In some cases, your physician may not have viewed the results before they are released to [address] If you have questions regarding your results contact the physician who ordered the test/exam by phone or via [address] by choosing \"Ask a Medical Question. \"

## (undated) DEVICE — PIN BNE 4X110MM STRL -- 2/PK MAKO

## (undated) DEVICE — Device

## (undated) DEVICE — GARMENT,MEDLINE,DVT,INT,CALF,MED, GEN2: Brand: MEDLINE

## (undated) DEVICE — DRAPE,TOP,102X53,STERILE: Brand: MEDLINE

## (undated) DEVICE — TFN: Brand: MEDLINE INDUSTRIES, INC.

## (undated) DEVICE — SUT ETHBND 2 30IN LR DA GRN --

## (undated) DEVICE — SOLUTION IV 1000ML 0.9% SOD CHL

## (undated) DEVICE — (D)PREP SKN CHLRAPRP APPL 26ML -- CONVERT TO ITEM 371833

## (undated) DEVICE — SUTURE VCRL SZ 2-0 L27IN ABSRB UD L26MM CT-2 1/2 CIR J269H

## (undated) DEVICE — BLADE SURG SAW STD S STL OSC W/ SERR EDGE DISP

## (undated) DEVICE — 3M™ STERI-DRAPE™ INSTRUMENT POUCH 1018: Brand: STERI-DRAPE™

## (undated) DEVICE — URETERAL ACCESS SHEATH SET: Brand: NAVIGATOR HD

## (undated) DEVICE — AMD ANTIMICROBIAL GAUZE SPONGES,12 PLY USP TYPE VII, 0.2% POLYHEXAMETHYLENE BIGUANIDE HCI (PHMB): Brand: CURITY

## (undated) DEVICE — REM POLYHESIVE ADULT PATIENT RETURN ELECTRODE: Brand: VALLEYLAB

## (undated) DEVICE — SOLUTION IV DEXTROSE/SALINE 5%-0.9% 500ML - 500ML

## (undated) DEVICE — CURETTE BNE CEM 10IN DISP --

## (undated) DEVICE — KIT DRP FOR RIO ROBOTIC ARM ASST SYS

## (undated) DEVICE — DRAPE SHT 3 QTR PROXIMA 53X77 --

## (undated) DEVICE — SUTURE 2 0 STRATAFIX SYMMETRIC PDS + 60CM CT 1 SXPP1A439

## (undated) DEVICE — KIT PROC KNE TRACKING PK/1 -- VIZADISC MAKO

## (undated) DEVICE — Device: Brand: POWER-FLO®

## (undated) DEVICE — BIPOLAR SEALER 23-112-1 AQM 6.0: Brand: AQUAMANTYS ®

## (undated) DEVICE — SYR 50ML LR LCK 1ML GRAD NSAF --

## (undated) DEVICE — SUTURE ABSRB X-1 REV CUT 1/2 CIR 22MM UD BRAID 27IN SZ 3-0 J458H

## (undated) DEVICE — CONTAINER,SPECIMEN,O.R.STRL,4.5OZ: Brand: MEDLINE

## (undated) DEVICE — PIN BNE FIX 4X140MM STRL -- 1EA=2PK MAKO

## (undated) DEVICE — TRAY PREP DRY W/ PREM GLV 2 APPL 6 SPNG 2 UNDPD 1 OVERWRAP

## (undated) DEVICE — SOLUTION IRRIG 3000ML 0.9% SOD CHL FLX CONT 0797208] ICU MEDICAL INC]

## (undated) DEVICE — CYSTO/BLADDER IRRIGATION SET, REGULATING CLAMP

## (undated) DEVICE — KIT INT FIX FEM TIB CKPT MAKOPLASTY

## (undated) DEVICE — X-LARGE COTTON GLOVE: Brand: DEROYAL

## (undated) DEVICE — Z DISCONTINUED USE 2744636  DRESSING AQUACEL 14 IN ALG W3.5XL14IN POLYUR FLM CVR W/ HYDRCOLL

## (undated) DEVICE — STERILE PRESSURE PROTECTOR PAD® FOR DE MAYO UNIVERSAL DISTRACTOR® (10/CASE): Brand: DE MAYO UNIVERSAL DISTRACTOR®

## (undated) DEVICE — HANDPIECE SET WITH COAXIAL HIGH FLOW TIP AND SUCTION TUBE: Brand: INTERPULSE

## (undated) DEVICE — DRAPE C-ARMOUR C-ARM KIT --

## (undated) DEVICE — TOTAL KNEE DR JENNINGS: Brand: MEDLINE INDUSTRIES, INC.

## (undated) DEVICE — BUTTON SWITCH PENCIL BLADE ELECTRODE, HOLSTER: Brand: EDGE

## (undated) DEVICE — BONE SCR LAG L110MM SLD SLDE FOR PERITROCHANTERIC NAIL SYS
Type: IMPLANTABLE DEVICE | Site: HIP | Status: NON-FUNCTIONAL
Removed: 2021-06-19

## (undated) DEVICE — 7 DAY SILVER-COATED ANTIMICROBIAL BARRIER DRESSING: Brand: ACTICOAT 7  4" X 5"

## (undated) DEVICE — SUTURE VCRL SZ 1 L27IN ABSRB UD L36MM CP-1 1/2 CIR REV CUT J268H

## (undated) DEVICE — HOOKED-PRONG GRASPING FORCEPS: Brand: TRICEP

## (undated) DEVICE — GDWIRE 3CM FLX-TIP 0.038X150CM -- BX/5 SENSOR

## (undated) DEVICE — PREP SKN CHLRAPRP APL 26ML STR --

## (undated) DEVICE — CORD RETRCT SIL

## (undated) DEVICE — SUTURE PDS II SZ 1 L96IN ABSRB VLT TP-1 L65MM 1/2 CIR Z880G

## (undated) DEVICE — SOLUTION IRRIG 3000ML H2O STRL BAG

## (undated) DEVICE — DISPOSABLE DRAPE, STERILE, FOR A CDS-3060 5 FOOT TABLE: Brand: PEDIGO PRODUCTS, INC.

## (undated) DEVICE — T4 HOOD

## (undated) DEVICE — BIT DRL L300MM DIA5MM CANN QUIK CPL ADJ STP REUSE

## (undated) DEVICE — DRAPE,U/SHT,SPLIT,FILM,60X84,STERILE: Brand: MEDLINE

## (undated) DEVICE — SHEET, DRAPE, SPLIT, STERILE: Brand: MEDLINE

## (undated) DEVICE — PACK SURGICAL PROCEDURE KIT CYSTOSCOPY TOTE